# Patient Record
Sex: FEMALE | Race: BLACK OR AFRICAN AMERICAN | NOT HISPANIC OR LATINO | Employment: UNEMPLOYED | URBAN - METROPOLITAN AREA
[De-identification: names, ages, dates, MRNs, and addresses within clinical notes are randomized per-mention and may not be internally consistent; named-entity substitution may affect disease eponyms.]

---

## 2018-12-03 ENCOUNTER — OFFICE VISIT (OUTPATIENT)
Dept: URGENT CARE | Facility: MEDICAL CENTER | Age: 48
End: 2018-12-03
Payer: COMMERCIAL

## 2018-12-03 VITALS
RESPIRATION RATE: 20 BRPM | DIASTOLIC BLOOD PRESSURE: 78 MMHG | SYSTOLIC BLOOD PRESSURE: 132 MMHG | TEMPERATURE: 96.8 F | OXYGEN SATURATION: 99 % | HEART RATE: 76 BPM

## 2018-12-03 DIAGNOSIS — R35.0 URINE FREQUENCY: Primary | ICD-10-CM

## 2018-12-03 DIAGNOSIS — R10.2 PELVIC PAIN: ICD-10-CM

## 2018-12-03 LAB
SL AMB  POCT GLUCOSE, UA: NEGATIVE
SL AMB LEUKOCYTE ESTERASE,UA: NEGATIVE
SL AMB POCT BILIRUBIN,UA: NEGATIVE
SL AMB POCT BLOOD,UA: NEGATIVE
SL AMB POCT CLARITY,UA: CLEAR
SL AMB POCT COLOR,UA: YELLOW
SL AMB POCT KETONES,UA: NEGATIVE
SL AMB POCT NITRITE,UA: NEGATIVE
SL AMB POCT PH,UA: 7
SL AMB POCT SPECIFIC GRAVITY,UA: 1.01
SL AMB POCT URINE HCG: NEGATIVE
SL AMB POCT URINE PROTEIN: NEGATIVE
SL AMB POCT UROBILINOGEN: NEGATIVE

## 2018-12-03 PROCEDURE — 87086 URINE CULTURE/COLONY COUNT: CPT | Performed by: PHYSICIAN ASSISTANT

## 2018-12-03 PROCEDURE — 81002 URINALYSIS NONAUTO W/O SCOPE: CPT | Performed by: PHYSICIAN ASSISTANT

## 2018-12-03 PROCEDURE — 99283 EMERGENCY DEPT VISIT LOW MDM: CPT | Performed by: PHYSICIAN ASSISTANT

## 2018-12-03 PROCEDURE — G0382 LEV 3 HOSP TYPE B ED VISIT: HCPCS | Performed by: PHYSICIAN ASSISTANT

## 2018-12-03 RX ORDER — MELATONIN
1000 DAILY
COMMUNITY
End: 2020-07-09

## 2018-12-03 NOTE — LETTER
December 3, 2018     Patient: Carl Pérez   YOB: 1970   Date of Visit: 12/3/2018       To Whom It May Concern: It is my medical opinion that Carl Pérez may return to work on 12/04/2018  If you have any questions or concerns, please don't hesitate to call           Sincerely,        Ottoniel Rodríguez PA-C    CC: No Recipients

## 2018-12-03 NOTE — PROGRESS NOTES
[email or web address]        NAME: Cam Nelson is a 57 y o  female  : 1970    MRN: [id number]  DATE: December 3, 2018  TIME: 11:56 AM    Assessment and Plan   Urine frequency [R35 0]  1  Urine frequency  POCT urine dip    Urine culture   2  Pelvic pain  POCT urine HCG         Patient Instructions     Follow up with PCP in 3-5 days  Proceed to  ER if symptoms worsen  Chief Complaint     Chief Complaint   Patient presents with    Possible UTI     Pt with urgency and frequency of urination for 1 week  Denies fever or chills  Complaining of suprapubic pressure  History of Present Illness       70-year-old female presents with urinary symptoms and pelvic pain  Patient states she began with urinary symptoms approximately 1 week ago  She noted her urine to have a strong odor as well as frequency and hesitancy  She also then developed some pelvic discomfort and fullness in her pelvic region  Patient states she is little bit late for her menstrual period  Denies possibility of pregnancy  Patient currently uses Essure for birth control  She was recently at her OBGYN 6-7 months ago and had a normal examination  Denies any vaginal complaints, rash or discharge  Review of Systems   Review of Systems   Constitutional: Negative  Genitourinary: Positive for dysuria, frequency, pelvic pain and urgency  Negative for decreased urine volume, difficulty urinating, dyspareunia, enuresis, flank pain, genital sores, hematuria, menstrual problem, vaginal bleeding, vaginal discharge and vaginal pain  Skin: Negative for rash           Current Medications       Current Outpatient Prescriptions:     cholecalciferol (VITAMIN D3) 1,000 units tablet, Take 1,000 Units by mouth daily, Disp: , Rfl:     Current Allergies     Allergies as of 2018 - Reviewed 2018   Allergen Reaction Noted    Penicillins Hives 2018            The following portions of the patient's history were reviewed and updated as appropriate: allergies, current medications, past family history, past medical history, past social history, past surgical history and problem list     Objective   /78 (BP Location: Right arm, Patient Position: Sitting, Cuff Size: Standard)   Pulse 76   Temp (!) 96 8 °F (36 °C) (Tympanic)   Resp 20   SpO2 99%        Physical Exam     Physical Exam   Constitutional: Vital signs are normal  She appears well-developed and well-nourished  No distress  HENT:   Head: Normocephalic and atraumatic  Right Ear: Hearing, tympanic membrane, external ear and ear canal normal    Left Ear: Hearing, tympanic membrane, external ear and ear canal normal    Nose: Nose normal  No mucosal edema or rhinorrhea  Right sinus exhibits no maxillary sinus tenderness and no frontal sinus tenderness  Left sinus exhibits no maxillary sinus tenderness and no frontal sinus tenderness  Mouth/Throat: Uvula is midline, oropharynx is clear and moist and mucous membranes are normal  No oropharyngeal exudate, posterior oropharyngeal edema, posterior oropharyngeal erythema or tonsillar abscesses  Eyes: Conjunctivae and lids are normal    Cardiovascular: Normal rate, regular rhythm and normal heart sounds  No murmur heard  Pulmonary/Chest: Effort normal and breath sounds normal  No respiratory distress  She has no decreased breath sounds  She has no wheezes  She has no rhonchi  She has no rales  Abdominal: Bowel sounds are normal  There is tenderness in the suprapubic area  There is no rigidity, no rebound, no guarding and no CVA tenderness  Lymphadenopathy:        Head (right side): No submandibular and no tonsillar adenopathy present  Head (left side): No submandibular and no tonsillar adenopathy present  Skin: No rash noted  Nursing note and vitals reviewed

## 2018-12-03 NOTE — PATIENT INSTRUCTIONS
Urinary symptoms, pelvic pain and fullness:  Advised patient urine dip and pregnancy test was negative  We will send the urine specimen to the hospital for culture  Results of the culture will be back on Wednesday  If there is any sign of infection we will treat her with the appropriate antibiotic  Advised her to follow up with her OBGYN as soon as possible for an examination  Any worsening of symptoms go to the ER

## 2018-12-05 LAB — BACTERIA UR CULT: NORMAL

## 2019-10-25 ENCOUNTER — OFFICE VISIT (OUTPATIENT)
Dept: OBGYN CLINIC | Facility: CLINIC | Age: 49
End: 2019-10-25
Payer: COMMERCIAL

## 2019-10-25 VITALS
WEIGHT: 254 LBS | SYSTOLIC BLOOD PRESSURE: 122 MMHG | BODY MASS INDEX: 40.82 KG/M2 | DIASTOLIC BLOOD PRESSURE: 78 MMHG | HEIGHT: 66 IN

## 2019-10-25 DIAGNOSIS — Z12.31 ENCOUNTER FOR SCREENING MAMMOGRAM FOR BREAST CANCER: ICD-10-CM

## 2019-10-25 DIAGNOSIS — Z12.4 CERVICAL CANCER SCREENING: ICD-10-CM

## 2019-10-25 DIAGNOSIS — Z30.8 ENCOUNTER FOR POST ESSURE STERILIZATION CHECK: ICD-10-CM

## 2019-10-25 DIAGNOSIS — Z01.419 ENCOUNTER FOR GYNECOLOGICAL EXAMINATION (GENERAL) (ROUTINE) WITHOUT ABNORMAL FINDINGS: Primary | ICD-10-CM

## 2019-10-25 PROCEDURE — 99386 PREV VISIT NEW AGE 40-64: CPT | Performed by: OBSTETRICS & GYNECOLOGY

## 2019-10-25 RX ORDER — PANTOPRAZOLE SODIUM 40 MG/1
40 TABLET, DELAYED RELEASE ORAL 2 TIMES DAILY
COMMUNITY
Start: 2019-03-21 | End: 2019-11-01 | Stop reason: SDUPTHER

## 2019-10-25 RX ORDER — FERROUS SULFATE 325(65) MG
325 TABLET ORAL DAILY
COMMUNITY

## 2019-10-25 NOTE — PATIENT INSTRUCTIONS
*CALL ONCE YOUR NEXT PERIOD STARTS*  *SCHEDULE MAMMOGRAM*      Wellness Visit for Adults   AMBULATORY CARE:   A wellness visit  is when you see your healthcare provider to get screened for health problems  You can also get advice on how to stay healthy  Write down your questions so you remember to ask them  Ask your healthcare provider how often you should have a wellness visit  What happens at a wellness visit:  Your healthcare provider will ask about your health, and your family history of health problems  This includes high blood pressure, heart disease, and cancer  He or she will ask if you have symptoms that concern you, if you smoke, and about your mood  You may also be asked about your intake of medicines, supplements, food, and alcohol  Any of the following may be done:  · Your weight  will be checked  Your height may also be checked so your body mass index (BMI) can be calculated  Your BMI shows if you are at a healthy weight  · Your blood pressure  and heart rate will be checked  Your temperature may also be checked  · Blood and urine tests  may be done  Blood tests may be done to check your cholesterol levels  Abnormal cholesterol levels increase your risk for heart disease and stroke  You may also need a blood or urine test to check for diabetes if you are at increased risk  Urine tests may be done to look for signs of an infection or kidney disease  · A physical exam  includes checking your heartbeat and lungs with a stethoscope  Your healthcare provider may also check your skin to look for sun damage  · Screening tests  may be recommended  A screening test is done to check for diseases that may not cause symptoms  The screening tests you may need depend on your age, gender, family history, and lifestyle habits  For example, colorectal screening may be recommended if you are 48years old or older    Screening tests you need if you are a woman:   · A Pap smear  is used to screen for cervical cancer  Pap smears are usually done every 3 to 5 years depending on your age  You may need them more often if you have had abnormal Pap smear test results in the past  Ask your healthcare provider how often you should have a Pap smear  · A mammogram  is an x-ray of your breasts to screen for breast cancer  Experts recommend mammograms every 2 years starting at age 48 years  You may need a mammogram at age 52 years or younger if you have an increased risk for breast cancer  Talk to your healthcare provider about when you should start having mammograms and how often you need them  Vaccines you may need:   · Get an influenza vaccine  every year  The influenza vaccine protects you from the flu  Several types of viruses cause the flu  The viruses change over time, so new vaccines are made each year  · Get a tetanus-diphtheria (Td) booster vaccine  every 10 years  This vaccine protects you against tetanus and diphtheria  Tetanus is a severe infection that may cause painful muscle spasms and lockjaw  Diphtheria is a severe bacterial infection that causes a thick covering in the back of your mouth and throat  · Get a human papillomavirus (HPV) vaccine  if you are female and aged 23 to 32 or male 23 to 24 and never received it  This vaccine protects you from HPV infection  HPV is the most common infection spread by sexual contact  HPV may also cause vaginal, penile, and anal cancers  · Get a pneumococcal vaccine  if you are aged 72 years or older  The pneumococcal vaccine is an injection given to protect you from pneumococcal disease  Pneumococcal disease is an infection caused by pneumococcal bacteria  The infection may cause pneumonia, meningitis, or an ear infection  · Get a shingles vaccine  if you are aged 61 or older, even if you have had shingles before  The shingles vaccine is an injection to protect you from the varicella-zoster virus  This is the same virus that causes chickenpox  Shingles is a painful rash that develops in people who had chickenpox or have been exposed to the virus  How to eat healthy:  My Plate is a model for planning healthy meals  It shows the types and amounts of foods that should go on your plate  Fruits and vegetables make up about half of your plate, and grains and protein make up the other half  A serving of dairy is included on the side of your plate  The amount of calories and serving sizes you need depends on your age, gender, weight, and height  Examples of healthy foods are listed below:  · Eat a variety of vegetables  such as dark green, red, and orange vegetables  You can also include canned vegetables low in sodium (salt) and frozen vegetables without added butter or sauces  · Eat a variety of fresh fruits , canned fruit in 100% juice, frozen fruit, and dried fruit  · Include whole grains  At least half of the grains you eat should be whole grains  Examples include whole-wheat bread, wheat pasta, brown rice, and whole-grain cereals such as oatmeal     · Eat a variety of protein foods such as seafood (fish and shellfish), lean meat, and poultry without skin (turkey and chicken)  Examples of lean meats include pork leg, shoulder, or tenderloin, and beef round, sirloin, tenderloin, and extra lean ground beef  Other protein foods include eggs and egg substitutes, beans, peas, soy products, nuts, and seeds  · Choose low-fat dairy products such as skim or 1% milk or low-fat yogurt, cheese, and cottage cheese  · Limit unhealthy fats  such as butter, hard margarine, and shortening  Exercise:  Exercise at least 30 minutes per day on most days of the week  Some examples of exercise include walking, biking, dancing, and swimming  You can also fit in more physical activity by taking the stairs instead of the elevator or parking farther away from stores  Include muscle strengthening activities 2 days each week   Regular exercise provides many health benefits  It helps you manage your weight, and decreases your risk for type 2 diabetes, heart disease, stroke, and high blood pressure  Exercise can also help improve your mood  Ask your healthcare provider about the best exercise plan for you  General health and safety guidelines:   · Do not smoke  Nicotine and other chemicals in cigarettes and cigars can cause lung damage  Ask your healthcare provider for information if you currently smoke and need help to quit  E-cigarettes or smokeless tobacco still contain nicotine  Talk to your healthcare provider before you use these products  · Limit alcohol  A drink of alcohol is 12 ounces of beer, 5 ounces of wine, or 1½ ounces of liquor  · Lose weight, if needed  Being overweight increases your risk of certain health conditions  These include heart disease, high blood pressure, type 2 diabetes, and certain types of cancer  · Protect your skin  Do not sunbathe or use tanning beds  Use sunscreen with a SPF 15 or higher  Apply sunscreen at least 15 minutes before you go outside  Reapply sunscreen every 2 hours  Wear protective clothing, hats, and sunglasses when you are outside  · Drive safely  Always wear your seatbelt  Make sure everyone in your car wears a seatbelt  A seatbelt can save your life if you are in an accident  Do not use your cell phone when you are driving  This could distract you and cause an accident  Pull over if you need to make a call or send a text message  · Practice safe sex  Use latex condoms if are sexually active and have more than one partner  Your healthcare provider may recommend screening tests for sexually transmitted infections (STIs)  · Wear helmets, lifejackets, and protective gear  Always wear a helmet when you ride a bike or motorcycle, go skiing, or play sports that could cause a head injury  Wear protective equipment when you play sports  Wear a lifejacket when you are on a boat or doing water sports    © 2017 [facility] Information is for End User's use only and may not be sold, redistributed or otherwise used for commercial purposes  All illustrations and images included in CareNotes® are the copyrighted property of [employer]  or Ji Goodman  The above information is an  only  It is not intended as medical advice for individual conditions or treatments  Talk to your doctor, nurse or pharmacist before following any medical regimen to see if it is safe and effective for you  Breast Self Exam for Women   WHAT YOU NEED TO KNOW:   A BSE is a way to check your breasts for lumps and other changes  Regular BSEs can help you know how your breasts normally look and feel  Most breast lumps or changes are not cancer, but you should always have them checked by a healthcare provider  Your healthcare provider can also watch you do a BSE and can tell you if you are doing your BSE correctly  DISCHARGE INSTRUCTIONS:   Contact your healthcare provider if:   · You find any lumps or changes in your breasts  · You have breast pain or fluid coming from your nipples  · You have questions or concerns about your condition or care  Why you should do a BSE:  Breast cancer is the most common type of cancer in women  Even if you have mammograms, you may still want to do a BSE regularly  If you know how your breasts normally feel and look, it may help you know when to contact your healthcare provider  Mammograms can miss some cancers  You may find a lump during a BSE that did not show up on your mammogram   When you should do a BSE:  Arron Larve your calendar to help you remember to do BSE on a regular schedule  One easy way to remember to do a BSE is to do the exam on the same day of each month  If you have periods, you may want to do your BSE 1 week after your period ends  This is the time when your breasts may be the least swollen, lumpy, or tender   You can do regular BSEs even if you are breastfeeding or have breast implants  How to do a BSE:   · Look at your breasts in a mirror  Look at the size and shape of each breast and nipple  Check for swelling, lumps, dimpling, scaly skin, or other skin changes  Look for nipple changes, such as a nipple that is painful or beginning to pull inward  Gently squeeze both nipples and check to see if fluid (that is not breast milk) comes out of them  If you find any of these or other breast changes, contact your healthcare provider  Check your breasts while you sit or  the following 3 positions:    [facility] your arms down at your sides  ¨ Raise your hands and join them behind your head  ¨ Put firm pressure with your hands on your hips  Bend slightly forward while you look at your breasts in the mirror  · Lie down and feel your breasts  When you lie down, your breast tissue spreads out evenly over your chest  This makes it easier for you to feel for lumps and anything that may not be normal for your breasts  Do a BSE on one breast at a time  ¨ Place a small pillow or towel under your left shoulder  Put your left arm behind your head  ¨ Use the 3 middle fingers of your right hand  Use your fingertip pads, on the top of your fingers  Your fingertip pad is the most sensitive part of your finger  ¨ Use small circles to feel your breast tissue  Use your fingertip pads to make dime-sized, overlapping circles on your breast and armpits  Use light, medium, and firm pressure  First, press lightly  Second, press with medium pressure to feel a little deeper into the breast  Last, use firm pressure to feel deep within your breast     ¨ Examine your entire breast area  Examine the breast area from above the breast to below the breast where you feel only ribs  Make small circles with your fingertips, starting in the middle of your armpit  Make circles going up and down the breast area  Continue toward your breast and all the way across it  Examine the area from your armpit all the way over to the middle of your chest (breastbone)  Stop at the middle of your chest     ¨ Move the pillow or towel to your right shoulder, and put your right arm behind your head  Use the 3 fingertip pads of your left hand, and repeat the above steps to do a BSE on your right breast        What else you can do to check for breast problems or cancer:  Some experts suggest that women 36years of age or older should have a mammogram every year  Other experts suggest that women between the ages of 48and 76years old should have a mammogram every 2 years  Talk to your healthcare provider about when you should have a mammogram   Follow up with your healthcare provider as directed: Your healthcare provider can watch you and tell you if you are doing your BSE correctly  Write down your questions so you remember to ask them during your visits  © 2017 [address] [employer] Information is for End User's use only and may not be sold, redistributed or otherwise used for commercial purposes  All illustrations and images included in CareNotes® are the copyrighted property of [employer]  or Ji Goodman  The above information is an  only  It is not intended as medical advice for individual conditions or treatments  Talk to your doctor, nurse or pharmacist before following any medical regimen to see if it is safe and effective for you

## 2019-10-25 NOTE — PROGRESS NOTES
Assessment        Diagnoses and all orders for this visit:    Encounter for gynecological examination (general) (routine) without abnormal findings    Cervical cancer screening    Encounter for screening mammogram for breast cancer  -     Mammo screening bilateral w 3d & cad; Future    Other orders  -     Cancel: Liquid-based pap, screening  -     ferrous sulfate 325 (65 Fe) mg tablet; ferrous sulfate 325 mg (65 mg iron) tablet  -     pantoprazole (PROTONIX) 40 mg tablet; pantoprazole 40 mg tablet,delayed release   TAKE 1 TABLET BY MOUTH TWICE A DAY                  Plan      Await pap smear results  Breast self exam technique reviewed and patient encouraged to perform self-exam monthly  Contraception: Essure  Educational material distributed  Follow up in 1 year  Follow up as needed  Mammogram    PAP deferred      Reviewed ASCCP recommendations for cervical cytology with patient  It is recommended to start screening at age of 24  Women aged 21-29 should be screened with cytology alone every 3 years  Women aged 33-67 should be screened with cytology with HPV co-testing every 5 years or cytology alone every 3 years  Women older than 72 do not need screening if they had adequate negative screening in the past (3 consecutive negative cytology or 2 negative co-tests) 10 years  Women who underwent total hysterectomy for benign indications and do no have a history of RADHA 2 or higher do not need cervical cytology screening  XRAY in 2011 shows possible dye spillage on the right, she states she had a re occlusion, she will have repeat HSG for now  Asked pt to call once her next period starts to coordinate scheduling HSG  Sharad      Flako Romero is a 54 y o  female who presents for annual exam     She is sexually active with  of 20+ years  She has no complaints    She had the Essure done about 10 years ago (10/10/2010)    Last Pap: 8/2/17 negative, neg HPV  Last mammogram: 7/30/18    Current contraception: aRshida  History of abnormal Pap smear: no  History of abnormal mammogram: no  Family history of uterine or ovarian cancer: (MOM) - ovarian  Family history of breast cancer: no    Menstrual History:  OB History        10    Para   4    Term   3       1    AB   6    Living   4       SAB   6    TAB   0    Ectopic   0    Multiple   0    Live Births   3                Menarche age: 14  Patient's last menstrual period was 10/15/2019 (exact date)  Past Medical History:   Diagnosis Date    PCOS (polycystic ovarian syndrome)      History reviewed  No pertinent surgical history    Social History     Socioeconomic History    Marital status: /Civil Union     Spouse name: Not on file    Number of children: Not on file    Years of education: Not on file    Highest education level: Not on file   Occupational History    Not on file   Social Needs    Financial resource strain: Not on file    Food insecurity:     Worry: Not on file     Inability: Not on file    Transportation needs:     Medical: Not on file     Non-medical: Not on file   Tobacco Use    Smoking status: Former Smoker    Smokeless tobacco: Never Used   Substance and Sexual Activity    Alcohol use: No    Drug use: No    Sexual activity: Yes     Partners: Male     Birth control/protection: Female Sterilization     Comment: Rashida    Lifestyle    Physical activity:     Days per week: Not on file     Minutes per session: Not on file    Stress: Not on file   Relationships    Social connections:     Talks on phone: Not on file     Gets together: Not on file     Attends Islam service: Not on file     Active member of club or organization: Not on file     Attends meetings of clubs or organizations: Not on file     Relationship status: Not on file    Intimate partner violence:     Fear of current or ex partner: Not on file     Emotionally abused: Not on file     Physically abused: Not on file     Forced sexual activity: Not on file   Other Topics Concern    Not on file   Social History Narrative    Not on file         Current Outpatient Medications:     cholecalciferol (VITAMIN D3) 1,000 units tablet, Take 1,000 Units by mouth daily, Disp: , Rfl:     ferrous sulfate 325 (65 Fe) mg tablet, ferrous sulfate 325 mg (65 mg iron) tablet, Disp: , Rfl:     pantoprazole (PROTONIX) 40 mg tablet, pantoprazole 40 mg tablet,delayed release  TAKE 1 TABLET BY MOUTH TWICE A DAY, Disp: , Rfl:     Allergies   Allergen Reactions    Penicillins Hives           Review of Systems   Constitutional: Negative  HENT: Negative  Eyes: Negative  Respiratory: Negative  Cardiovascular: Negative  Gastrointestinal: Negative  Endocrine: Negative  Genitourinary:        As noted in HPI   Musculoskeletal: Negative  Skin: Negative  Allergic/Immunologic: Negative  Neurological: Negative  Hematological: Negative  Psychiatric/Behavioral: Negative  /78 (BP Location: Right arm, Patient Position: Sitting, Cuff Size: Standard)   Ht 5' 6" (1 676 m)   Wt 115 kg (254 lb)   LMP 10/15/2019 (Exact Date)   BMI 41 00 kg/m²         Physical Exam   Constitutional: She is oriented to person, place, and time  She appears well-developed and well-nourished  Genitourinary: Rectum normal, vagina normal and uterus normal  Pelvic exam was performed with patient supine  There is no rash or lesion on the right labia  There is no rash or lesion on the left labia  Vagina exhibits rugosity  Vagina exhibits no lesion  No bleeding in the vagina  No vaginal discharge found  Right adnexum does not display mass, does not display tenderness and does not display fullness  Left adnexum does not display mass, does not display tenderness and does not display fullness  Cervix does not exhibit motion tenderness, lesion or polyp  Uterus is not enlarged or tender  Rectal exam shows no external hemorrhoid     Genitourinary Comments: Perineum and anus: Normal    Pelvic support  - Vaginal outlet: normal  - Anterior vaginal wall: normal  - Posterior vaginal wall: normal    Bladder: normal    Urethra: normal    Urethral support: normal       HENT:   Head: Normocephalic  Neck: No thyromegaly present  Cardiovascular: Normal rate, regular rhythm and normal heart sounds  No murmur heard  Pulmonary/Chest: Effort normal and breath sounds normal  No respiratory distress  She has no wheezes  She has no rales  Right breast exhibits no mass, no nipple discharge, no skin change and no tenderness  Left breast exhibits no mass, no nipple discharge, no skin change and no tenderness  Abdominal: Soft  She exhibits no distension and no mass  There is no tenderness  There is no rebound and no guarding  Musculoskeletal: She exhibits no edema or tenderness  Lymphadenopathy:        Right: No inguinal adenopathy present  Left: No inguinal adenopathy present  Neurological: She is alert and oriented to person, place, and time  Skin: Skin is warm and dry  Psychiatric: She has a normal mood and affect

## 2019-11-01 ENCOUNTER — OFFICE VISIT (OUTPATIENT)
Dept: GASTROENTEROLOGY | Facility: MEDICAL CENTER | Age: 49
End: 2019-11-01
Payer: COMMERCIAL

## 2019-11-01 VITALS
HEART RATE: 81 BPM | DIASTOLIC BLOOD PRESSURE: 68 MMHG | TEMPERATURE: 97.8 F | BODY MASS INDEX: 42 KG/M2 | SYSTOLIC BLOOD PRESSURE: 115 MMHG | WEIGHT: 260.2 LBS

## 2019-11-01 DIAGNOSIS — K29.70 GASTRITIS WITHOUT BLEEDING, UNSPECIFIED CHRONICITY, UNSPECIFIED GASTRITIS TYPE: ICD-10-CM

## 2019-11-01 DIAGNOSIS — K80.20 CALCULUS OF GALLBLADDER WITHOUT CHOLECYSTITIS WITHOUT OBSTRUCTION: ICD-10-CM

## 2019-11-01 DIAGNOSIS — R10.9 LEFT SIDED ABDOMINAL PAIN: Primary | ICD-10-CM

## 2019-11-01 DIAGNOSIS — R11.0 NAUSEA: ICD-10-CM

## 2019-11-01 DIAGNOSIS — R19.4 CHANGE IN BOWEL HABIT: ICD-10-CM

## 2019-11-01 DIAGNOSIS — R74.8 ELEVATED LIPASE: ICD-10-CM

## 2019-11-01 PROBLEM — R19.5 CHANGE IN STOOL: Status: ACTIVE | Noted: 2019-11-01

## 2019-11-01 PROCEDURE — 99244 OFF/OP CNSLTJ NEW/EST MOD 40: CPT | Performed by: INTERNAL MEDICINE

## 2019-11-01 RX ORDER — DICYCLOMINE HYDROCHLORIDE 10 MG/1
10 CAPSULE ORAL 2 TIMES DAILY PRN
Qty: 60 CAPSULE | Refills: 4 | Status: SHIPPED | OUTPATIENT
Start: 2019-11-01 | End: 2020-07-02 | Stop reason: ALTCHOICE

## 2019-11-01 RX ORDER — PANTOPRAZOLE SODIUM 40 MG/1
40 TABLET, DELAYED RELEASE ORAL 2 TIMES DAILY
Qty: 60 TABLET | Refills: 7 | Status: SHIPPED | OUTPATIENT
Start: 2019-11-01 | End: 2019-11-26

## 2019-11-01 RX ORDER — ONDANSETRON 4 MG/1
4 TABLET, ORALLY DISINTEGRATING ORAL EVERY 6 HOURS PRN
Qty: 20 TABLET | Refills: 0 | Status: SHIPPED | OUTPATIENT
Start: 2019-11-01 | End: 2020-07-02 | Stop reason: ALTCHOICE

## 2019-11-01 NOTE — PROGRESS NOTES
[facility] - Outpatient Consultation  Haris Lr 54 y o  female MRN: [id number]  Encounter: [id number]          ASSESSMENT AND PLAN:      1  Left sided abdominal pain  - Giardia antigen; Future  - Celiac Disease Antibody Profile; Future  - Ova and parasite examination; Future  - Ova and parasite examination; Future  - Calprotectin,Fecal; Future  - Clostridium difficile toxin by PCR; Future  - Stool Enteric Bacterial Panel by PCR; Future  - dicyclomine (BENTYL) 10 mg capsule; Take 1 capsule (10 mg total) by mouth 2 (two) times a day as needed (For abdominal pain)  Dispense: 60 capsule; Refill: 4  - Lipase; Future  - CT abdomen w contrast; Future  - Comprehensive metabolic panel; Future    2  Gastritis without bleeding, unspecified chronicity, unspecified gastritis type    - pantoprazole (PROTONIX) 40 mg tablet; Take 1 tablet (40 mg total) by mouth 2 (two) times a day  Dispense: 60 tablet; Refill: 7    3  Change in bowel habit  - Colonoscopy; Future  - EGD; Future    4  Nausea  - ondansetron (ZOFRAN-ODT) 4 mg disintegrating tablet; Take 1 tablet (4 mg total) by mouth every 6 (six) hours as needed for nausea or vomiting  Dispense: 20 tablet; Refill: 0    She is a 57-year-old with symptoms which are compatible with IBS D presents with abdominal pain in the periumbilical region, change in bowel habits, increased lipase, nausea, history of cholelithiasis, presents here for evaluation  Her biggest complaint currently is regarding diffuse abdominal pain  She does have history of lactose intolerance has been avoiding dairy without improvement of symptoms  Her symptoms are associated gas/bloating with foul-smelling bowel movements  Symptoms started approximately year ago  She has had endoscopic evaluation earlier this year which showed gastritis and duodenitis  She denies any NSAID use currently  She did have improvement symptoms of PPI therapy      I suspect her mild elevation in lipase may be secondary to gastritis and not compatible with acute pancreatitis  Despite this I would like to recheck lipase level to make sure it has not continue to increase and check CT scan for evaluation for pancreatitis  Also plan for EGD and colonoscopy evaluation with stool studies for further evaluation of foul-smelling odor stool and change in bowel habits  If EGD and colonoscopy are negative, stool studies are negative, CT scan is negative we can consider probiotics  We can also consider treatment with rifaximin  Discuss FODMAP diet today   ______________________________________________________________________    HPI:      She is a 82-year-old female with change in bowel habits with IBS D like symptoms, diffuse abdominal pain, gallstone disease, kidney stone disease presents here for evaluation  No family history of colorectal cancer  She has had debilitating symptoms of gas bloating and had undergone EGD evaluation and March of 2019 which showed gastritis/duodenitis  Biopsies were negative for H pylori and celiac disease  This was performed early evaluate hospita  She does have history of cholelithiasis and symptoms are secondary to oral intake of food  She was being considered for cholecystectomy but this has been deferred after having negative ultrasound for cholecystitis and HIDA scan  She is on iron therapy but not taking frequently  She was also on PPI therapy which did improve her symptoms but recently ran out of refills  No prior colonoscopy evaluation  She does also report foul-smelling stool and symptoms more compatible with IBS D with loose stools  She has had antibiotic exposure approximately year ago  REVIEW OF SYSTEMS:    CONSTITUTIONAL: Denies any fever, chills, rigors, and weight loss  HEENT: No earache or tinnitus  Denies hearing loss or visual disturbances  CARDIOVASCULAR: No chest pain or palpitations     RESPIRATORY: Denies any cough, hemoptysis, shortness of breath or dyspnea on exertion  GASTROINTESTINAL: As noted in the History of Present Illness  GENITOURINARY: No problems with urination  Denies any hematuria or dysuria  NEUROLOGIC: No dizziness or vertigo, denies headaches  MUSCULOSKELETAL: Denies any muscle or joint pain  SKIN: Denies skin rashes or itching  ENDOCRINE: Denies excessive thirst  Denies intolerance to heat or cold  PSYCHOSOCIAL: Denies depression or anxiety  Denies any recent memory loss  Historical Information   Past Medical History:   Diagnosis Date    PCOS (polycystic ovarian syndrome)      History reviewed  No pertinent surgical history  Social History   Social History     Substance and Sexual Activity   Alcohol Use No     Social History     Substance and Sexual Activity   Drug Use No     Social History     Tobacco Use   Smoking Status Former Smoker   Smokeless Tobacco Never Used     Family History   Problem Relation Age of Onset    Ovarian cancer Mother        Meds/Allergies       Current Outpatient Medications:     cholecalciferol (VITAMIN D3) 1,000 units tablet    dicyclomine (BENTYL) 10 mg capsule    ferrous sulfate 325 (65 Fe) mg tablet    ondansetron (ZOFRAN-ODT) 4 mg disintegrating tablet    pantoprazole (PROTONIX) 40 mg tablet    Allergies   Allergen Reactions    Penicillins Hives           Objective     Blood pressure 115/68, pulse 81, temperature 97 8 °F (36 6 °C), temperature source Tympanic, weight 118 kg (260 lb 3 2 oz), last menstrual period 10/15/2019  Body mass index is 42 kg/m²  PHYSICAL EXAM:      General Appearance:   Alert, cooperative, no distress   HEENT:   Normocephalic, atraumatic, anicteric      Neck:  Supple, symmetrical, trachea midline   Lungs:   Clear to auscultation bilaterally; no rales, rhonchi or wheezing; respirations unlabored    Heart[de-identified]   Regular rate and rhythm; no murmur, rub, or gallop     Abdomen:   Soft, diffuse tenderness and distension; normal bowel sounds; no masses, no organomegaly    Genitalia:   Deferred    Rectal:   Deferred    Extremities:  No cyanosis, clubbing or edema    Pulses:  2+ and symmetric    Skin:  No jaundice, rashes, or lesions    Lymph nodes:  No palpable cervical lymphadenopathy        Lab Results:   No visits with results within 1 Day(s) from this visit  Latest known visit with results is:   Office Visit on 12/03/2018   Component Date Value    LEUKOCYTE ESTERASE,UA 12/03/2018 negative     NITRITE,UA 12/03/2018 negative     SL AMB POCT UROBILINOGEN 12/03/2018 negative     POCT URINE PROTEIN 12/03/2018 negative      PH,UA 12/03/2018 7 0     BLOOD,UA 12/03/2018 negative     SPECIFIC GRAVITY,UA 12/03/2018 1 010     KETONES,UA 12/03/2018 negative     BILIRUBIN,UA 12/03/2018 negative     GLUCOSE, UA 12/03/2018 negative      COLOR,UA 12/03/2018 yellow     CLARITY,UA 12/03/2018 clear     URINE HCG 12/03/2018 negative     Urine Culture 12/03/2018 20,000-29,000 cfu/ml           Radiology Results:   No results found

## 2019-11-04 ENCOUNTER — TELEPHONE (OUTPATIENT)
Dept: GASTROENTEROLOGY | Facility: CLINIC | Age: 49
End: 2019-11-04

## 2019-11-04 NOTE — TELEPHONE ENCOUNTER
Patients GI provider:  Dr Ludin Grier    Number to return call: [phone]    Reason for call: Pt calling to sched colon/egd procedures    Scheduled procedure/appointment date if applicable: NA

## 2019-11-08 ENCOUNTER — TELEPHONE (OUTPATIENT)
Dept: GASTROENTEROLOGY | Facility: MEDICAL CENTER | Age: 49
End: 2019-11-08

## 2019-11-08 NOTE — TELEPHONE ENCOUNTER
Attempted to leave message to schedule procedure with patient but the mailbox is full  Will try to reach patient Monday morning

## 2019-11-08 NOTE — TELEPHONE ENCOUNTER
----- Message from Yamile Allen MD sent at 11/8/2019  9:29 AM EST -----  Regarding: RE: Colon/Egd Procedure  I can be over booked for 11/14  ----- Message -----  From: Haris Keller  Sent: 11/4/2019   8:56 AM EST  To: Yamile Allen MD  Subject: Colon/Egd Procedure                              Patient was seen Friday 11/01, on the checkout sheet you stated that you would like the procedures to be done soon in a hospital  I am only seeing 11/14 in [facility] and 12/10 at [facility] heart, both days are full  Please advise if you would like to overbook the patient or if it is okay to wait until the schedule opens up  Thanks

## 2019-11-11 ENCOUNTER — HOSPITAL ENCOUNTER (OUTPATIENT)
Dept: CT IMAGING | Facility: HOSPITAL | Age: 49
Discharge: HOME | End: 2019-11-11
Attending: INTERNAL MEDICINE
Payer: COMMERCIAL

## 2019-11-11 ENCOUNTER — TELEPHONE (OUTPATIENT)
Dept: GASTROENTEROLOGY | Facility: MEDICAL CENTER | Age: 49
End: 2019-11-11

## 2019-11-11 DIAGNOSIS — R10.9 LEFT SIDED ABDOMINAL PAIN: ICD-10-CM

## 2019-11-11 PROCEDURE — 74160 CT ABDOMEN W/CONTRAST: CPT

## 2019-11-11 RX ADMIN — IOHEXOL 100 ML: 350 INJECTION, SOLUTION INTRAVENOUS at 17:11

## 2019-11-11 NOTE — TELEPHONE ENCOUNTER
Patient called and confirmed 11/14 was a good day for her procedure  She is aware she needs a  and voiced understanding of prep instructions that were given while in office

## 2019-11-11 NOTE — TELEPHONE ENCOUNTER
Called and spoke to pt - needed to be r/s per Alfonso FERGUSON   Pt r/s for 11/15/19 with Dr Abby Arguelles at [facility]

## 2019-11-11 NOTE — TELEPHONE ENCOUNTER
----- Message from Onel García MD sent at 11/8/2019  9:29 AM EST -----  Regarding: RE: Colon/Egd Procedure  I can be over booked for 11/14  ----- Message -----  From: Samm Kendrick  Sent: 11/4/2019   8:56 AM EST  To: Onel García MD  Subject: Colon/Egd Procedure                              Patient was seen Friday 11/01, on the checkout sheet you stated that you would like the procedures to be done soon in a hospital  I am only seeing 11/14 in [facility] and 12/10 at [facility] heart, both days are full  Please advise if you would like to overbook the patient or if it is okay to wait until the schedule opens up  Thanks

## 2019-11-11 NOTE — TELEPHONE ENCOUNTER
----- Message from Mika Nagel MD sent at 11/8/2019  9:29 AM EST -----  Regarding: RE: Colon/Egd Procedure  I can be over booked for 11/14  ----- Message -----  From: Robson Ruffin  Sent: 11/4/2019   8:56 AM EST  To: Mika Nagel MD  Subject: Colon/Egd Procedure                              Patient was seen Friday 11/01, on the checkout sheet you stated that you would like the procedures to be done soon in a hospital  I am only seeing 11/14 in [facility] and 12/10 at [facility] heart, both days are full  Please advise if you would like to overbook the patient or if it is okay to wait until the schedule opens up  Thanks

## 2019-11-13 ENCOUNTER — TELEPHONE (OUTPATIENT)
Dept: GASTROENTEROLOGY | Facility: MEDICAL CENTER | Age: 49
End: 2019-11-13

## 2019-11-13 NOTE — TELEPHONE ENCOUNTER
----- Message from Miri Gooden MD sent at 11/12/2019  7:27 PM EST -----  She was identified to have gallstones it is unclear if this is the cause of her symptoms  I am still waiting follow-up labs which were ordered  Will further evaluate on EGD and colonoscopy evaluation as well    CT scan also identified a nonobstructive kidney stone    Otherwise CT scan was within normal limits

## 2019-11-14 ENCOUNTER — TELEPHONE (OUTPATIENT)
Dept: GASTROENTEROLOGY | Facility: HOSPITAL | Age: 49
End: 2019-11-14

## 2019-11-14 ENCOUNTER — TELEPHONE (OUTPATIENT)
Dept: GASTROENTEROLOGY | Facility: CLINIC | Age: 49
End: 2019-11-14

## 2019-11-14 RX ORDER — SODIUM CHLORIDE 9 MG/ML
125 INJECTION, SOLUTION INTRAVENOUS CONTINUOUS
Status: CANCELLED | OUTPATIENT
Start: 2019-11-14

## 2019-11-14 NOTE — TELEPHONE ENCOUNTER
Called pt's pharmacy, spoke to Pixley forest, PharmD  Ordered 2 Dulcolax tablets and 238 grams of Miralax powder to pt's pharmacy  Called and spoke to pt to let her know that they were called in and to  2 32oz Gatorades (no red, orange, or purple)  Pt demonstrated understanding

## 2019-11-14 NOTE — TELEPHONE ENCOUNTER
Patients GI provider:  Dr Hugh Arenas    Number to return call: (n/a    Reason for call: Pt calling to request a script for her miralax prep to be sent to [facility] in [place]       Scheduled procedure/appointment date if applicable: Apt/procedure - tomorrow Colonoscopy/EGD

## 2019-11-15 ENCOUNTER — HOSPITAL ENCOUNTER (OUTPATIENT)
Dept: GASTROENTEROLOGY | Facility: HOSPITAL | Age: 49
Setting detail: OUTPATIENT SURGERY
Discharge: HOME | End: 2019-11-15
Attending: INTERNAL MEDICINE
Payer: COMMERCIAL

## 2019-11-15 ENCOUNTER — ANESTHESIA EVENT (OUTPATIENT)
Dept: GASTROENTEROLOGY | Facility: HOSPITAL | Age: 49
End: 2019-11-15

## 2019-11-15 ENCOUNTER — ANESTHESIA (OUTPATIENT)
Dept: GASTROENTEROLOGY | Facility: HOSPITAL | Age: 49
End: 2019-11-15

## 2019-11-15 VITALS
SYSTOLIC BLOOD PRESSURE: 107 MMHG | BODY MASS INDEX: 42 KG/M2 | RESPIRATION RATE: 16 BRPM | HEART RATE: 80 BPM | TEMPERATURE: 98.8 F | OXYGEN SATURATION: 96 % | HEIGHT: 66 IN | DIASTOLIC BLOOD PRESSURE: 64 MMHG

## 2019-11-15 DIAGNOSIS — R19.4 CHANGE IN BOWEL HABIT: ICD-10-CM

## 2019-11-15 LAB
EXT PREGNANCY TEST URINE: NEGATIVE
EXT. CONTROL: NORMAL

## 2019-11-15 PROCEDURE — 81025 URINE PREGNANCY TEST: CPT | Performed by: ANESTHESIOLOGY

## 2019-11-15 PROCEDURE — 45385 COLONOSCOPY W/LESION REMOVAL: CPT | Performed by: INTERNAL MEDICINE

## 2019-11-15 PROCEDURE — 88305 TISSUE EXAM BY PATHOLOGIST: CPT | Performed by: PATHOLOGY

## 2019-11-15 PROCEDURE — 43239 EGD BIOPSY SINGLE/MULTIPLE: CPT | Performed by: INTERNAL MEDICINE

## 2019-11-15 PROCEDURE — 45380 COLONOSCOPY AND BIOPSY: CPT | Performed by: INTERNAL MEDICINE

## 2019-11-15 RX ORDER — PROPOFOL 10 MG/ML
INJECTION, EMULSION INTRAVENOUS AS NEEDED
Status: DISCONTINUED | OUTPATIENT
Start: 2019-11-15 | End: 2019-11-15 | Stop reason: SURG

## 2019-11-15 RX ORDER — SODIUM CHLORIDE 9 MG/ML
125 INJECTION, SOLUTION INTRAVENOUS CONTINUOUS
Status: DISCONTINUED | OUTPATIENT
Start: 2019-11-15 | End: 2019-11-19 | Stop reason: HOSPADM

## 2019-11-15 RX ORDER — CETIRIZINE HYDROCHLORIDE 10 MG/1
10 TABLET ORAL DAILY PRN
COMMUNITY

## 2019-11-15 RX ADMIN — PROPOFOL 50 MG: 10 INJECTION, EMULSION INTRAVENOUS at 13:06

## 2019-11-15 RX ADMIN — PROPOFOL 50 MG: 10 INJECTION, EMULSION INTRAVENOUS at 12:58

## 2019-11-15 RX ADMIN — PROPOFOL 50 MG: 10 INJECTION, EMULSION INTRAVENOUS at 13:04

## 2019-11-15 RX ADMIN — PROPOFOL 50 MG: 10 INJECTION, EMULSION INTRAVENOUS at 13:22

## 2019-11-15 RX ADMIN — PROPOFOL 50 MG: 10 INJECTION, EMULSION INTRAVENOUS at 12:42

## 2019-11-15 RX ADMIN — PROPOFOL 120 MG: 10 INJECTION, EMULSION INTRAVENOUS at 12:30

## 2019-11-15 RX ADMIN — PROPOFOL 50 MG: 10 INJECTION, EMULSION INTRAVENOUS at 13:19

## 2019-11-15 RX ADMIN — PROPOFOL 50 MG: 10 INJECTION, EMULSION INTRAVENOUS at 13:02

## 2019-11-15 RX ADMIN — PROPOFOL 50 MG: 10 INJECTION, EMULSION INTRAVENOUS at 12:36

## 2019-11-15 RX ADMIN — PROPOFOL 50 MG: 10 INJECTION, EMULSION INTRAVENOUS at 13:00

## 2019-11-15 RX ADMIN — PROPOFOL 50 MG: 10 INJECTION, EMULSION INTRAVENOUS at 12:49

## 2019-11-15 RX ADMIN — PROPOFOL 50 MG: 10 INJECTION, EMULSION INTRAVENOUS at 12:34

## 2019-11-15 RX ADMIN — PROPOFOL 50 MG: 10 INJECTION, EMULSION INTRAVENOUS at 12:52

## 2019-11-15 RX ADMIN — PROPOFOL 50 MG: 10 INJECTION, EMULSION INTRAVENOUS at 12:38

## 2019-11-15 RX ADMIN — PROPOFOL 50 MG: 10 INJECTION, EMULSION INTRAVENOUS at 12:33

## 2019-11-15 RX ADMIN — PROPOFOL 50 MG: 10 INJECTION, EMULSION INTRAVENOUS at 12:46

## 2019-11-15 RX ADMIN — PROPOFOL 50 MG: 10 INJECTION, EMULSION INTRAVENOUS at 12:55

## 2019-11-15 RX ADMIN — SODIUM CHLORIDE 125 ML/HR: 0.9 INJECTION, SOLUTION INTRAVENOUS at 12:06

## 2019-11-15 NOTE — H&P
H&P EXAM - Outpatient Endoscopy   Laura Fleming 54 y o  female MRN: [id number]    [facility] GI LAB PRE/POST   Encounter: [id number]        History and Physical -  [facility]  Laura Fleming 54 y o  female MRN: [id number]                  HPI: Laura Fleming is a 54y o  year old female who presents for egd/colonoscopy      REVIEW OF SYSTEMS: Per the HPI, and otherwise unremarkable  Historical Information   Past Medical History:   Diagnosis Date    GERD (gastroesophageal reflux disease)     History of transfusion     Kidney stone     PCOS (polycystic ovarian syndrome)      Past Surgical History:   Procedure Laterality Date    DILATION AND CURETTAGE OF UTERUS      D&E    EGD       Social History   Social History     Substance and Sexual Activity   Alcohol Use No     Social History     Substance and Sexual Activity   Drug Use No     Social History     Tobacco Use   Smoking Status Former Smoker   Smokeless Tobacco Never Used     Family History   Problem Relation Age of Onset    Ovarian cancer Mother        Meds/Allergies       (Not in a hospital admission)    Allergies   Allergen Reactions    Penicillins Hives       Objective     /62   Pulse 64   Temp 98 8 °F (37 1 °C) (Temporal)   Resp 18   Ht 5' 6" (1 676 m)   LMP 11/08/2019   SpO2 98%   BMI 42 00 kg/m²       PHYSICAL EXAM    Gen: NAD  CV: RRR  CHEST: Clear  ABD: soft, NT/ND  EXT: no edema      ASSESSMENT/PLAN:  This is a 54y o  year old female here for egd/colonoscopy, and she is stable and optimized for her procedure

## 2019-11-15 NOTE — ANESTHESIA PREPROCEDURE EVALUATION
Review of Systems/Medical History          Cardiovascular  Negative cardio ROS Exercise tolerance (METS): >4,     Pulmonary  Negative pulmonary ROS        GI/Hepatic    GERD ,             Endo/Other  Negative endo/other ROS   Obesity  morbid obesity   GYN  , Prior pregnancy/OB history : 7+ Parity: 4,          Hematology  Negative hematology ROS      Musculoskeletal  Negative musculoskeletal ROS        Neurology  Negative neurology ROS      Psychology   Negative psychology ROS              Physical Exam    Airway    Mallampati score: II  TM Distance: >3 FB  Neck ROM: full     Dental   No notable dental hx     Cardiovascular  Comment: Negative ROS, Cardiovascular exam normal    Pulmonary  Pulmonary exam normal     Other Findings        Anesthesia Plan  ASA Score- 3     Anesthesia Type- IV sedation with anesthesia with ASA Monitors  Additional Monitors:   Airway Plan:         Plan Factors-    Induction- intravenous  Postoperative Plan-     Informed Consent- Anesthetic plan and risks discussed with patient

## 2019-11-15 NOTE — DISCHARGE INSTRUCTIONS
Upper Endoscopy   WHAT YOU NEED TO KNOW:   An upper endoscopy is also called an upper gastrointestinal (GI) endoscopy, or an esophagogastroduodenoscopy (EGD)  You may feel bloated, gassy, or have some abdominal discomfort after your procedure  Your throat may be sore for 24 to 36 hours  You may burp or pass gas from air that is still inside your body  DISCHARGE INSTRUCTIONS:   Call 911 if:   · You have sudden chest pain or trouble breathing  Seek care immediately if:   · You feel dizzy or faint  · You have trouble swallowing  · You have severe throat pain  · Your bowel movements are very dark or black  · Your abdomen is hard and firm and you have severe pain  · You vomit blood  Contact your healthcare provider if:   · You feel full or bloated and cannot burp or pass gas  · You have not had a bowel movement for 3 days after your procedure  · You have neck pain  · You have a fever or chills  · You have nausea or are vomiting  · You have a rash or hives  · You have questions or concerns about your endoscopy  Relieve a sore throat:  Suck on throat lozenges or crushed ice  Gargle with a small amount of warm salt water  Mix 1 teaspoon of salt and 1 cup of warm water to make salt water  Relieve gas and discomfort from bloating:  Lie on your right side with a heating pad on your abdomen  Take short walks to help pass gas  Eat small meals until bloating is relieved  Rest after your procedure:  Do not drive or make important decisions until the day after your procedure  Return to your normal activity as directed  You can usually return to work the day after your procedure  Follow up with your healthcare provider as directed:  Write down your questions so you remember to ask them during your visits  © 2017 [employer] Information is for End User's use only and may not be sold, redistributed or otherwise used for commercial purposes   All illustrations and images included in CareNotes® are the copyrighted property of A [employer]  or Ji Goodman  The above information is an  only  It is not intended as medical advice for individual conditions or treatments  Talk to your doctor, nurse or pharmacist before following any medical regimen to see if it is safe and effective for you  Colonoscopy   WHAT YOU NEED TO KNOW:   A colonoscopy is a procedure to examine the inside of your colon (intestine) with a scope  Polyps or tissue growths may have been removed during your colonoscopy  It is normal to feel bloated and to have some abdominal discomfort  You should be passing gas  If you have hemorrhoids or you had polyps removed, you may have a small amount of bleeding  DISCHARGE INSTRUCTIONS:   Seek care immediately if:   · You have a large amount of bright red blood in your bowel movements  · Your abdomen is hard and firm and you have severe pain  · You have sudden trouble breathing  Contact your healthcare provider if:   · You develop a rash or hives  · You have a fever within 24 hours of your procedure  · You have not had a bowel movement for 3 days after your procedure  · You have questions or concerns about your condition or care  Activity:   · Do not lift, strain, or run  for 3 days after your procedure  · Rest after your procedure  You have been given medicine to relax you  Do not  drive or make important decisions until the day after your procedure  Return to your normal activity as directed  · Relieve gas and discomfort from bloating  by lying on your right side with a heating pad on your abdomen  You may need to take short walks to help the gas move out  Eat small meals until bloating is relieved  If you had polyps removed: For 7 days after your procedure:  · Do not  take aspirin  · Do not  go on long car rides  Help prevent constipation:   · Eat a variety of healthy foods    Healthy foods include fruit, vegetables, whole-grain breads, low-fat dairy products, beans, lean meat, and fish  Ask if you need to be on a special diet  Your healthcare provider may recommend that you eat high-fiber foods such as cooked beans  Fiber helps you have regular bowel movements  · Drink liquids as directed  Adults should drink between 9 and 13 eight-ounce cups of liquid every day  Ask what amount is best for you  For most people, good liquids to drink are water, juice, and milk  · Exercise as directed  Talk to your healthcare provider about the best exercise plan for you  Exercise can help prevent constipation, decrease your blood pressure and improve your health  Follow up with your healthcare provider as directed:  Write down your questions so you remember to ask them during your visits  © 2017 [facility] Information is for End User's use only and may not be sold, redistributed or otherwise used for commercial purposes  All illustrations and images included in CareNotes® are the copyrighted property of A [employer] A M [employer]  or Ji Goodman  The above information is an  only  It is not intended as medical advice for individual conditions or treatments  Talk to your doctor, nurse or pharmacist before following any medical regimen to see if it is safe and effective for you  Colorectal Polyps   WHAT YOU NEED TO KNOW:   Colorectal polyps are small growths of tissue in the lining of the colon and rectum  Most polyps are hyperplastic polyps and are usually benign (noncancerous)  Certain types of polyps, called adenomatous polyps, may turn into cancer  DISCHARGE INSTRUCTIONS:   Follow up with your healthcare provider or gastroenterologist as directed: You may need to return for more tests, such as another colonoscopy  Write down your questions so you remember to ask them during your visits    Reduce your risk for colorectal polyps:   · Eat a variety of healthy foods: Healthy foods include fruit, vegetables, whole-grain breads, low-fat dairy products, beans, lean meat, and fish  Ask if you need to be on a special diet  · Maintain a healthy weight:  Ask your healthcare provider if you need to lose weight and how much you need to lose  Ask for help with a weight loss program     · Exercise:  Begin to exercise slowly and do more as you get stronger  Talk with your healthcare provider before you start an exercise program      · Limit alcohol:  Your risk for polyps increases the more you drink  · Do not smoke: If you smoke, it is never too late to quit  Ask for information about how to stop  For support and more information:   · Hetal Parm[facility] ([facility]) [address] , [facility] [address]  Phone: [phone]  Web Address: www digestive  [facility]  Contact your healthcare provider or gastroenterologist if:   · You have a fever  · You have chills, a cough, or feel weak and achy  · You have abdominal pain that does not go away or gets worse after you take medicine  · Your abdomen is swollen  · You are losing weight without trying  · You have questions or concerns about your condition or care  Seek care immediately or call 911 if:   · You have sudden shortness of breath  · You have a fast heart rate, fast breathing, or are too dizzy to stand up  · You have severe abdominal pain  · You see blood in your bowel movement  © 2017 [address] [employer] Information is for End User's use only and may not be sold, redistributed or otherwise used for commercial purposes  All illustrations and images included in CareNotes® are the copyrighted property of [employer]  or Ji Goodman  The above information is an  only  It is not intended as medical advice for individual conditions or treatments   Talk to your doctor, nurse or pharmacist before following any medical regimen to see if it is safe and effective for you  Hiatal Hernia   WHAT YOU NEED TO KNOW:   What is a hiatal hernia? A hiatal hernia is a condition that causes part of your stomach to bulge through the hiatus (small opening) in your diaphragm  The part of the stomach may move up and down, or it may get trapped above the diaphragm  What increases my risk for a hiatal hernia? The exact cause of a hiatal hernia is not known  You may have been born with a large hiatus  The following may increase your risk of a hiatal hernia:  · Obesity    · Older age    · Medical conditions such as diverticulosis or esophagitis    · Previous surgery of the esophagus or stomach or trauma such as from a motor vehicle accident  What are the types of hiatal hernia? · Type I (sliding hiatal hernia): A portion of the stomach slides in and out of the hiatus  This type is the most common and usually causes gastroesophageal reflux disease (GERD)  GERD occurs when the esophageal sphincter does not close properly and causes acid reflux  The esophageal sphincter is the lower muscle of the esophagus  · Type II (paraesophageal hiatal hernia):  Type II hiatal hernia forms when a part of the stomach squeezes through the hiatus and lies next to the esophagus  · Type III (combined):  Type III hiatal hernia is a combination of a sliding and a paraesophageal hiatal hernia  · Type IV (complex paraesophageal hiatal hernia): The whole stomach, the small and large bowels, spleen, pancreas, or liver is pushed up into the chest   What are the signs and symptoms of a hiatal hernia? The most common symptom is heartburn  This usually occurs after meals and spreads to your neck, jaw, or shoulder   You may have no signs or symptoms, or you may have any of the following:  · Abdominal pain, especially in the area just above your navel    · Bitter or acid taste in your mouth    · Trouble swallowing    · Coughing or hoarseness    · Chest pain or shortness of breath that occurs after eating    · Frequent burping or hiccups    · Uncomfortable feeling of fullness after eating  How is a hiatal hernia diagnosed? · An upper GI series test  includes x-rays of your esophagus, stomach, and your small intestines  It is also called a barium swallow test  You will be given barium (a chalky liquid) to drink before the pictures are taken  This liquid helps your stomach and intestines show up better on the x-rays  An upper GI series can show if you have an ulcer, a blocked intestine, or other problems  · An endoscopy  uses a scope to see the inside of your digestive tract  A scope is a long, bendable tube with a light on the end of it  A camera may be hooked to the scope to take pictures  How is a hiatal hernia treated? Treatment depends on the type of hiatal hernia you have and on your symptoms  You may not need any treatment  You may need any of the following:  · Medicines  may be given to relieve heartburn symptoms  These medicines help to decrease or block stomach acid  You may also be given medicines that help to tighten the esophageal sphincter  · Surgery  may be done when medicines cannot control your symptoms, or other problems are present  Your healthcare provider may also suggest surgery depending on the type of hernia you have  Your healthcare provider can put your stomach back into its normal location  He may make the hiatus (hole) smaller and anchor your stomach in your abdomen  Fundoplication is a surgery that wraps the upper part of the stomach around the esophageal sphincter to strengthen it  How can I manage symptoms? The following nutrition and lifestyle changes may be recommended to relieve symptoms of heartburn  · Avoid foods that make your symptoms worse  These may include spicy foods, fruit juices, alcohol, caffeine, chocolate, and mint  · Eat several small meals during the day    Small meals give your stomach less food to digest     · Avoid lying down and bending forward after you eat  Do not eat meals 2 to 3 hours before bedtime  This decreases your risk for reflux  · Maintain a healthy weight  If you are overweight, weight loss may help relieve your symptoms  · Sleep with your head elevated  at least 6 inches  · Do not smoke  Smoking can increase your symptoms of heartburn  When should I seek immediate care? · You have severe abdominal pain  · You try to vomit but nothing comes out (retching)  · You have severe chest pain and sudden trouble breathing  · Your bowel movements are black or bloody  · Your vomit looks like coffee grounds or has blood in it  When should I contact my healthcare provider? · Your symptoms are getting worse  · You have nausea, and you are vomiting  · You are losing weight without trying  · You have questions or concerns about your condition or care  CARE AGREEMENT:   You have the right to help plan your care  Learn about your health condition and how it may be treated  Discuss treatment options with your caregivers to decide what care you want to receive  You always have the right to refuse treatment  The above information is an  only  It is not intended as medical advice for individual conditions or treatments  Talk to your doctor, nurse or pharmacist before following any medical regimen to see if it is safe and effective for you  © 2017 [address] Information is for End User's use only and may not be sold, redistributed or otherwise used for commercial purposes  All illustrations and images included in CareNotes® are the copyrighted property of A [employer] M [employer]  or Ji Horan   WHAT YOU NEED TO KNOW:   What is diverticulosis? Diverticulosis is a condition that causes small pockets called diverticula to form in your intestine   These pockets make it difficult for bowel movements to pass through your digestive system  What causes diverticulosis? Diverticula form when muscles have to work hard to move bowel movements through the intestine  The force causes bulges to form at weak areas in the intestine  This may happen if you eat foods that are low in fiber  Fiber helps give your bowel movements more bulk so they are larger and easier to move through your colon  The following may increase your risk of diverticulosis:  · A history of constipation    · Age 36 or older    · Obesity    · Lack of exercise  What are the signs and symptoms of diverticulosis? Diverticulosis usually does not cause any signs or symptoms  It may cause any of the following in some people:  · Pain or discomfort in your lower abdomen    · Abdominal bloating    · Constipation or diarrhea  How is diverticulosis diagnosed? Your healthcare provider will examine you and ask about your bowel movements, diet, and symptoms  He or she will also ask about any medical conditions you have or medicines you take  You may need any of the following:  · Blood tests  may be done to check for signs of inflammation  · A barium enema  is an x-ray of your colon that may show diverticula  A tube is put into your anus, and a liquid called barium is put through the tube  Barium is used so that healthcare providers can see your colon more clearly  · Flexible sigmoidoscopy  is a test to look for any changes in your lower intestines and rectum  It may also show the cause of any bleeding or pain  A soft, bendable tube with a light on the end will be put into your anus  It will then be moved forward into your intestine  · A colonoscopy  is used to look at your whole colon  A scope (long bendable tube with a light on the end) is used to take pictures  This test may show diverticula  · A CT scan , or CAT scan, may show diverticula  You may be given contrast liquid before the scan   Tell the healthcare provider if you have ever had an allergic reaction to contrast liquid  How is diverticulosis managed? The goal of treatment is to manage any symptoms you have and prevent other problems such as diverticulitis  Diverticulitis is swelling or infection of the diverticula  Your healthcare provider may recommend any of the following:  · Eat a variety of high-fiber foods  High-fiber foods help you have regular bowel movements  High-fiber foods include cooked beans, fruits, vegetables, and some cereals  Most adults need 25 to 35 grams of fiber each day  Your healthcare provider may recommend that you have more  Ask your healthcare provider how much fiber you need  Increase fiber slowly  You may have abdominal discomfort, bloating, and gas if you add fiber to your diet too quickly  You may need to take a fiber supplement if you are not getting enough fiber from food  · Medicines  to soften your bowel movements may be given  You may also need medicines to treat symptoms such as bloating and pain  · Drink liquids as directed  You may need to drink 2 to 3 liters (8 to 12 cups) of liquids every day  Ask your healthcare provider how much liquid to drink each day and which liquids are best for you  · Apply heat  on your abdomen for 20 to 30 minutes every 2 hours for as many days as directed  Heat helps decrease pain and muscle spasms  How can I help prevent diverticulitis or other symptoms? The following may help decrease your risk for diverticulitis or symptoms, such as bleeding  Talk to your provider about these or other things you can do to prevent problems that may occur with diverticulosis  · Exercise regularly  Ask your healthcare provider about the best exercise plan for you  Exercise can help you have regular bowel movements  Get 30 minutes of exercise on most days of the week  · Maintain a healthy weight  Ask your healthcare provider how much you should weigh  Ask him or her to help you create a weight loss plan if you are overweight  · Do not smoke  Nicotine and other chemicals in cigarettes increase your risk for diverticulitis  Ask your healthcare provider for information if you currently smoke and need help to quit  E-cigarettes or smokeless tobacco still contain nicotine  Talk to your healthcare provider before you use these products  · Ask your healthcare provider if it is safe to take NSAIDs  NSAIDs may increase your risk of diverticulitis  When should I seek immediate care? · You have severe pain on the left side of your lower abdomen  · Your bowel movements are bright or dark red  When should I contact my healthcare provider? · You have a fever and chills  · You feel dizzy or lightheaded  · You have nausea, or you are vomiting  · You have a change in your bowel movements  · You have questions or concerns about your condition or care  CARE AGREEMENT:   You have the right to help plan your care  Learn about your health condition and how it may be treated  Discuss treatment options with your caregivers to decide what care you want to receive  You always have the right to refuse treatment  The above information is an  only  It is not intended as medical advice for individual conditions or treatments  Talk to your doctor, nurse or pharmacist before following any medical regimen to see if it is safe and effective for you  © 2017 [address]  Information is for End User's use only and may not be sold, redistributed or otherwise used for commercial purposes  All illustrations and images included in CareNotes® are the copyrighted property of [employer]  or Ji Goodman

## 2019-11-19 ENCOUNTER — TELEPHONE (OUTPATIENT)
Dept: GASTROENTEROLOGY | Facility: MEDICAL CENTER | Age: 49
End: 2019-11-19

## 2019-11-19 NOTE — TELEPHONE ENCOUNTER
----- Message from Jo Ann Shea MD sent at 11/15/2019  3:21 PM EST -----  Can you schedule this patient for office followup with Dr Kristina Winter or a physician assistant in 2-3 weeks?

## 2019-11-25 NOTE — RESULT ENCOUNTER NOTE
My medical assistant will call patient with results  Small bowel and stomach biopsies showed mild inflammation from acid  There was no evidence of infection  Continue to take daily protonix 40mg   Avoid medicines like Ibuprofen/Motrin/Aleve  We will repeat the EGD in 6 months  The colon biopsies showed mild, non specific inflammation  The polyps removed during the colonoscopy was called a tubular adenoma  This is a pre-cancerous lesion and was completely removed  There was no evidence of cancer in the polyp       she should have the colonoscopy repeated in 3 years due to a history of colon polyps

## 2019-11-26 ENCOUNTER — OFFICE VISIT (OUTPATIENT)
Dept: GASTROENTEROLOGY | Facility: MEDICAL CENTER | Age: 49
End: 2019-11-26
Payer: COMMERCIAL

## 2019-11-26 ENCOUNTER — TELEPHONE (OUTPATIENT)
Dept: GASTROENTEROLOGY | Facility: AMBULARY SURGERY CENTER | Age: 49
End: 2019-11-26

## 2019-11-26 VITALS
HEART RATE: 78 BPM | SYSTOLIC BLOOD PRESSURE: 110 MMHG | TEMPERATURE: 97.6 F | DIASTOLIC BLOOD PRESSURE: 78 MMHG | WEIGHT: 263 LBS | BODY MASS INDEX: 42.27 KG/M2 | HEIGHT: 66 IN

## 2019-11-26 DIAGNOSIS — K31.89 MUCOSAL ABNORMALITY OF STOMACH: ICD-10-CM

## 2019-11-26 DIAGNOSIS — K29.80 DUODENITIS: Primary | ICD-10-CM

## 2019-11-26 DIAGNOSIS — R10.10 PAIN OF UPPER ABDOMEN: ICD-10-CM

## 2019-11-26 DIAGNOSIS — R14.0 BLOATING: ICD-10-CM

## 2019-11-26 DIAGNOSIS — R19.5 LOOSE STOOLS: ICD-10-CM

## 2019-11-26 PROCEDURE — 99214 OFFICE O/P EST MOD 30 MIN: CPT | Performed by: PHYSICIAN ASSISTANT

## 2019-11-26 RX ORDER — ESOMEPRAZOLE MAGNESIUM 40 MG/1
40 CAPSULE, DELAYED RELEASE ORAL
Qty: 180 CAPSULE | Refills: 2 | Status: SHIPPED | OUTPATIENT
Start: 2019-11-26 | End: 2020-07-07

## 2019-11-26 NOTE — TELEPHONE ENCOUNTER
Patients GI provider:  Dr Crews    Number to return call: (    Reason for call: Pt pharmacy called requesting prior authoriztion for both medications  Esomeprazole and probiotic    Scheduled procedure/appointment date if applicable: Apt/procedure   n/a

## 2019-11-26 NOTE — PROGRESS NOTES
[facility] - Outpatient Follow-up Note  Brenda Moore 54 y o  female MRN: [id number]  Encounter: [id number]          ASSESSMENT AND PLAN:    1  Gastric mucosal abnormality  2  Nodular duodenitis   -she had an upper endoscopy earlier this month showing moderate, generalized nodular mucosa with enlarged, abnormal folds in the stomach as well as a hiatal hernia and Schatzki's ring which was widely patent  There were also 2 submucosal appearing nodules in the 2nd part of the duodenum, biopsies showed nodular peptic duodenitis    -she was recommended to continue Protonix 40 mg twice daily and repeat the upper endoscopy in 6 months   -she continues to have a burning sensation in her upper abdomen when she eats, she feels like the Protonix helped but did not resolve her symptoms  -we discussed several possible treatment options and she would like to change PPI medications, start Nexium 40 mg twice daily and discontinue Protonix    -She will schedule the upper endoscopy   -Avoid NSAIDs   -follow-up in the office after the procedure    3  Abdominal pain  4  Bloating  5  Loose stool   -she reports that she continues to have abdominal pain and bloating after eating, she states that she has loose/soft stool but denies diarrhea  - She had a colonoscopy with random colon biopsies including from the terminal ileum being benign without evidence of microscopic colitis  There was a small area of focal acute colitis identified in the descending colon  She also had a polyp removed from the ascending colon which was a tubular adenoma so repeat was recommended in 3 years  She did have 2 other polyps removed, these were inflammatory polyps    Internal hemorrhoids and mild diverticulosis also seen    -she had a CT scan showing cholelithiasis without evidence of cholecystitis and a kidney stone but this was otherwise normal    -she also had a HIDA scan that was normal, she is following up with a surgeon regarding the gallstones however    -her fecal calprotectin was elevated at 176, will discuss if repeat is indicated with Dr Júnior Sampson   -stool studies for infectious cause of diarrhea including C diff, stool enteric bacterial panel, ova and parasite and Giardia/Cryptosporidium tests were all negative  -recommend starting a probiotic, she could also try the low FODMAP diet for 2 weeks and if this is effective she could reintroduce foods high iron FODMAP so 1 at a time to identify her trigger foods  If she does not have improvement in her symptoms, she can abandon this  -follow-up in the office in a few months to see how she is doing  ____________________________________________________________    SUBJECTIVE:    82-year-old female past medical history of GERD, PCOS presents to the office for follow-up  She reports she does continue to have intermittent abdominal pain that she describes as a burning and radiating to her shoulder blades after eating, she has seen a surgeon regarding this soon as she has gallstones  She did have a normal HIDA scan  She states she feels bloated after eating and also notes that she has soft/loose stools at times but denies any diarrhea  She denies constipation  She denies any new medications or supplements  She feels that the Protonix help with the burning upper abdominal pain however it did not completely resolve this  She denies any nausea, vomiting, difficulty swallowing, hematochezia, melena, change in appetite, unintended weight loss or jaundice  She is a former smoker  She denies any alcohol consumption  REVIEW OF SYSTEMS IS OTHERWISE NEGATIVE        Historical Information   Past Medical History:   Diagnosis Date    GERD (gastroesophageal reflux disease)     History of transfusion     Kidney stone     PCOS (polycystic ovarian syndrome)      Past Surgical History:   Procedure Laterality Date    DILATION AND CURETTAGE OF UTERUS      D&E    EGD       Social History Social History     Substance and Sexual Activity   Alcohol Use No     Social History     Substance and Sexual Activity   Drug Use No     Social History     Tobacco Use   Smoking Status Former Smoker   Smokeless Tobacco Never Used     Family History   Problem Relation Age of Onset    Ovarian cancer Mother        Meds/Allergies       Current Outpatient Medications:     cetirizine (ZyrTEC) 10 mg tablet    cholecalciferol (VITAMIN D3) 1,000 units tablet    dicyclomine (BENTYL) 10 mg capsule    ferrous sulfate 325 (65 Fe) mg tablet    ondansetron (ZOFRAN-ODT) 4 mg disintegrating tablet    pantoprazole (PROTONIX) 40 mg tablet    Allergies   Allergen Reactions    Penicillins Hives           Objective     Last menstrual period 11/08/2019  PHYSICAL EXAM:      Physical Exam   Constitutional: She is oriented to person, place, and time  She appears well-developed  No distress  Morbidly obese   HENT:   Head: Normocephalic and atraumatic  Eyes: Right eye exhibits no discharge  Left eye exhibits no discharge  No scleral icterus  Neck: Neck supple  No tracheal deviation present  Cardiovascular: Normal rate, regular rhythm and normal heart sounds  Exam reveals no gallop and no friction rub  No murmur heard  Pulmonary/Chest: Effort normal  No respiratory distress  She has no wheezes  She has no rales  Abdominal: Soft  Bowel sounds are normal  She exhibits no distension  There is no tenderness  There is no rebound and no guarding  Neurological: She is alert and oriented to person, place, and time  Skin: Skin is warm and dry  Psychiatric: She has a normal mood and affect  Lab Results:   No visits with results within 1 Day(s) from this visit     Latest known visit with results is:   Hospital Outpatient Visit on 11/15/2019   Component Date Value    EXT Preg Test, Ur 11/15/2019 Negative     Control 11/15/2019 Valid     Case Report 11/15/2019                      Value:Surgical Pathology Report                         Case: [id number]                                   Authorizing Provider:  Alanna Garay MD       Collected:           11/15/2019 1236              Ordering Location:     [facility]        Received:            11/15/2019 3 [facility]                                                          Pathologist:           Luca Uriostegui MD                                                          Specimens:   A) - Duodenum, bx, R/O celiac                                                                       B) - Duodenum, nodule bx, R/O adenoma                                                               C) - Duodenum, polypoid lesion bx                                                                   D) - Stomach, bx, R /O H  Pylori                                                                    E) - Stomach, bx, R/O tumor                                                                                                   F) - Large Intestine, Left/Descending Colon, bx                                                     G) - Terminal Ileum, bx                                                                             H) - Colon, random colon bx, R/O microscopic colitis                                                I) - Large Intestine, Right/Ascending Colon, polyp                                                  J) - Large Intestine, Left/Descending Colon, polyp                                                  K) - Rectum, polyp                                                                         Final Diagnosis 11/15/2019                      Value: This result contains rich text formatting which cannot be displayed here   Additional Information 11/15/2019                      Value: This result contains rich text formatting which cannot be displayed here      Synoptic Checklist 11/15/2019                      Value: (COLON/RECTUM POLYP FORM-GI - All Specimens)                                                                                                                 : Adenoma(s)     Leslie Rodas Description 11/15/2019                      Value: This result contains rich text formatting which cannot be displayed here  Radiology Results:   Ct Abdomen W Contrast    Result Date: 11/12/2019  Narrative: CT ABDOMEN WITH IV CONTRAST INDICATION:   Left upper quadrant from abdominal pain  COMPARISON: None  TECHNIQUE:  CT examination of the abdomen was performed  Axial, sagittal, and coronal 2D reformatted images were created from the source data and submitted for interpretation  Radiation dose length product (DLP) for this visit:  780 mGy-cm   This examination, like all CT scans performed in the [facility], was performed utilizing techniques to minimize radiation dose exposure, including the use of iterative reconstruction and automated exposure control  IV Contrast:  100 mL of iohexol (OMNIPAQUE) Enteric Contrast:  Enteric contrast was not administered  FINDINGS: ABDOMEN LOWER CHEST:  No clinically significant abnormality identified in the visualized lower chest  LIVER/BILIARY TREE:  Unremarkable  GALLBLADDER:  Cholelithiasis SPLEEN:  Unremarkable  PANCREAS:  Unremarkable  ADRENAL GLANDS:  Unremarkable  KIDNEYS/URETERS:  There is left kidney upper pole 2 mm nonobstructing calculus (series 2 image 29) right kidney midpole well-defined subcentimeter hypoattenuating lesion which is too small to characterize by CT, but statistically most favors benign cyst  VISUALIZED STOMACH AND BOWEL:  Unremarkable  ABDOMINAL CAVITY:  No ascites or free intraperitoneal air  No lymphadenopathy  Partially imaged postprocedural changes in the pelvis VESSELS:  Unremarkable for patient's age  ABDOMINAL WALL:  Unremarkable  OSSEOUS STRUCTURES:  No acute fracture or destructive osseous lesion  Impression: 1    No CT evidence of acute inflammatory process within the abdomen  2   Left kidney upper pole 2 mm nonobstructing calculus   3   Cholelithiasis Workstation performed: [id number]

## 2019-11-27 ENCOUNTER — TELEPHONE (OUTPATIENT)
Dept: GASTROENTEROLOGY | Facility: MEDICAL CENTER | Age: 49
End: 2019-11-27

## 2019-11-27 DIAGNOSIS — R10.84 GENERALIZED ABDOMINAL PAIN: Primary | ICD-10-CM

## 2019-11-27 NOTE — TELEPHONE ENCOUNTER
I called her and let her know Dr Rodeny Morel recommendations, she will repeat the fecal calprotectin test     ----- Message from Michael Caro MD sent at 11/27/2019 11:06 AM EST -----  I would go based on patient's symptoms if she still continues of IBS D like symptoms I would strongly advised her to check another fecal calprotectin is elevated we could try enemas  She would follow with us as an outpatient we could repeat scope between 1-3 years  Thank  ----- Message -----  From: Nayeli Marrero PA-C  Sent: 11/26/2019  12:26 PM EST  To: Michael Caro MD    I saw this patient the office today, her fecal calprotectin was elevated at 176 and on her colonoscopy she had 1 area of inflammation with possible overlying ulceration in the ascending colon which showed focal acute colitis on biopsy  Do you recommend repeating the fecal calprotectin? I'm not really sure what to make of her results

## 2019-12-10 ENCOUNTER — CLINICAL SUPPORT (OUTPATIENT)
Dept: BARIATRICS | Facility: CLINIC | Age: 49
End: 2019-12-10

## 2019-12-10 ENCOUNTER — CONSULT (OUTPATIENT)
Dept: SURGERY | Facility: MEDICAL CENTER | Age: 49
End: 2019-12-10
Payer: COMMERCIAL

## 2019-12-10 VITALS
WEIGHT: 263.5 LBS | TEMPERATURE: 97.5 F | HEIGHT: 67 IN | SYSTOLIC BLOOD PRESSURE: 124 MMHG | HEART RATE: 100 BPM | BODY MASS INDEX: 41.36 KG/M2 | DIASTOLIC BLOOD PRESSURE: 64 MMHG

## 2019-12-10 VITALS
BODY MASS INDEX: 42.19 KG/M2 | HEART RATE: 80 BPM | DIASTOLIC BLOOD PRESSURE: 76 MMHG | TEMPERATURE: 97.5 F | SYSTOLIC BLOOD PRESSURE: 120 MMHG | HEIGHT: 67 IN | WEIGHT: 268.8 LBS | RESPIRATION RATE: 16 BRPM

## 2019-12-10 DIAGNOSIS — K80.20 CALCULUS OF GALLBLADDER WITHOUT CHOLECYSTITIS WITHOUT OBSTRUCTION: Primary | ICD-10-CM

## 2019-12-10 DIAGNOSIS — E66.01 MORBID OBESITY (HCC): Primary | ICD-10-CM

## 2019-12-10 DIAGNOSIS — Z87.42 HISTORY OF POLYCYSTIC OVARIAN SYNDROME: ICD-10-CM

## 2019-12-10 DIAGNOSIS — E66.01 OBESITY, CLASS III, BMI 40-49.9 (MORBID OBESITY) (HCC): Primary | ICD-10-CM

## 2019-12-10 PROCEDURE — 99243 OFF/OP CNSLTJ NEW/EST LOW 30: CPT | Performed by: SURGERY

## 2019-12-10 PROCEDURE — RECHECK

## 2019-12-10 RX ORDER — PANTOPRAZOLE SODIUM 40 MG/1
40 TABLET, DELAYED RELEASE ORAL 2 TIMES DAILY
Refills: 2 | COMMUNITY
Start: 2019-11-27 | End: 2020-07-07

## 2019-12-10 NOTE — PROGRESS NOTES
Bariatric Behavioral Health Evaluation    Presenting Problem: 54year old female (1970) here for behavioral health evaluation  Patient wants to improve her health, increase energy, and prevent future health problems  Is the patient seeking Bariatric Surgery Eval? Yes  If yes how long have you researched this surgery option  Patient reports that she has been looking into bariatric surgery for about 2 years  Patient reports exhausting all other options  Patient reports that she went to [facility] for [facility] in 2017, but was unable to lose a significant amount of weight  Realizes Post- Op Requirements? Yes     Pre-morbid level of function and history of present illness: Patient reports that she started to gain weight when she was about 37 and has been gaining since without being able to lose any weight  Psychiatric/Psychological Treatment Diagnosis: Patient denies axis 1 diagnosis or treatment  Outpatient Counselor No      Psychiatrist No     Have you had Inpatient Treatment? No    Family Constellation (include relationship with each and Psych/Med HX)    Mother  obesity and tobacco use    Domestic Violence No    Abuse History:  Patient denies this    Additional comments/stressors related to family/relationships/peer support: Patient identifies her  and her teenage daughter as her support  Patient identifies "not being able to do the things that I want to do" as a stressor       Physical/Psychological Assessment:     Appearance: appropriate  Sociability: average  Affect: appropriate  Mood: calm  Thought Process: coherent  Speech: normal  Content: no impairment  Orientation: person  Yes , place  Yes , time  Yes , normal attention span  Yes , normal memory  Yes   and normal judgement  Yes   Insight: emotional  good    Risk Assessment:     none    Recommendations: Recommended for surgery  yes    Risk of Harm to Self or Others: Patient denies SI or HI    Observation:     Interviews This interview only    Access to weapons no     Based on the previous information, the client presents the following risk of harm to self or others: low     Note: Patient denies axis 1 diagnosis or treatment  Patient educated on the impact of nicotine and alcohol on the post bariatric patient  Patient meets criteria for surgery at this program and is referred to surgeon  BARIATRIC SURGERY EDUCATION CHECKLIST    I have received education related to my bariatric surgery process and understand:    Patients may be required to complete a psychiatric evaluation and receive clearance for surgery from their psychiatrist     Patients who undergo weight loss surgery are at higher risk of increased mental health concerns and suicide attempts  Patients may be required to complete a full substance abuse evaluation and then complete all treatment recommendations prior to surgery  If diagnosis of abuse/dependence results, patient may be required to remain sober for one (1) year before having bariatric surgery  Patients on psychiatric medications should check with their provider to discuss psychiatric medications and the changes in absorption  Patient should discuss all time release medications with provider and take all medications as prescribed  The recommendation is that there is no use of  any tobacco products, Hookah or  vapes for the bariatric post-operation patient  Bariatric surgery patients should not consume alcohol as a post-operative patient as it may increase risk of numerous health conditions including but not limited to alcohol abuse and ulcers  There is a possibility of weight regain if patient does not follow all program guidelines and recommendations  Bariatric surgery patients should exercise thirty (30) to sixty (60) minutes per day to maintain post-surgical weight loss      Research indicates that bariatric patients are more successful when they see a therapist for up to two (2) years post-op  Patients will follow all medical and dietary recommendations provided  Patient will keep all scheduled appointments and follow up with their physician for a minimum of five (5) years  Patient will take all vitamins as recommended  Post-operative vitamins are life-long  Patient reviewed Bariatric Surgery Education Checklist and agrees they have received education on these issues

## 2019-12-10 NOTE — PROGRESS NOTES
Bariatric Nutrition Assessment Note    Type of surgery    Preop  Surgery Date: patient has 6 month program requirement     Surgeon: Dr Andre Mcfadden  54 y o   female     Wt with BMI of 25: 161 2#  Pre-Op Excess Wt: 102 3#  /64 (BP Location: Right arm, Patient Position: Sitting)   Pulse 100   Temp 97 5 °F (36 4 °C) (Tympanic)   Ht 5' 7 25" (1 708 m)   Wt 120 kg (263 lb 8 oz)   BMI 40 96 kg/m²     [place]- [place]  [place] Equation:  BMR :  1867 kcal per day  Estimated calories for weight loss = 5347-0614 kcal per day (1-2# per wk wt loss - sedentary )  Estimated protein needs = 73-90 grams per day ( 1 0-1 2 grams /k g OBW)  Estimated fluid needs = 2564 kcal per day / 85 ounces per day ( 35 mg/kg IBW )     Weight History   Onset of Obesity: Childhood  Family history of obesity: Yes- mother   Wt Loss Attempts: Counseling with  MD  Exercise  FAD Diets (Cabbage soup, Grapefruit, Cleanse, etc )  High Protein/Low CHO diets (Atkins, Union, etc )  Meal Replacements (Medifast, Slim Fast, etc )  Nutrition Counseling with RD  phenteramine prescribed by her doctor, twice     Patient has tried the above for 6 months or more with insufficient weight loss or weight regain, which is why patient has requested to be evaluated for weight loss surgery today  Maximum Wt Lost: 5 pounds       Review of History and Medications   Past Medical History:   Diagnosis Date    GERD (gastroesophageal reflux disease)     History of transfusion     Kidney stone     PCOS (polycystic ovarian syndrome)      Past Surgical History:   Procedure Laterality Date    COLONOSCOPY  11/2019    DILATION AND CURETTAGE OF UTERUS      D&E    EGD      UPPER GASTROINTESTINAL ENDOSCOPY  11/2019     Social History     Socioeconomic History    Marital status: /Civil Union     Spouse name: None    Number of children: None    Years of education: None    Highest education level: None   Occupational History  None   Social Needs    Financial resource strain: None    Food insecurity:     Worry: None     Inability: None    Transportation needs:     Medical: None     Non-medical: None   Tobacco Use    Smoking status: Former Smoker    Smokeless tobacco: Never Used   Substance and Sexual Activity    Alcohol use: No    Drug use: No    Sexual activity: Yes     Partners: Male     Birth control/protection: Female Sterilization     Comment: Rashida 2010   Lifestyle    Physical activity:     Days per week: None     Minutes per session: None    Stress: None   Relationships    Social connections:     Talks on phone: None     Gets together: None     Attends Gnosticist service: None     Active member of club or organization: None     Attends meetings of clubs or organizations: None     Relationship status: None    Intimate partner violence:     Fear of current or ex partner: None     Emotionally abused: None     Physically abused: None     Forced sexual activity: None   Other Topics Concern    None   Social History Narrative    None       Current Outpatient Medications:     cetirizine (ZyrTEC) 10 mg tablet, Take 10 mg by mouth daily, Disp: , Rfl:     cholecalciferol (VITAMIN D3) 1,000 units tablet, Take 1,000 Units by mouth daily, Disp: , Rfl:     ferrous sulfate 325 (65 Fe) mg tablet, ferrous sulfate 325 mg (65 mg iron) tablet, Disp: , Rfl:     pantoprazole (PROTONIX) 40 mg tablet, Take 40 mg by mouth 2 (two) times a day, Disp: , Rfl: 2    dicyclomine (BENTYL) 10 mg capsule, Take 1 capsule (10 mg total) by mouth 2 (two) times a day as needed (For abdominal pain) (Patient not taking: Reported on 12/10/2019), Disp: 60 capsule, Rfl: 4    esomeprazole (NexIUM) 40 MG capsule, Take 1 capsule (40 mg total) by mouth 2 (two) times a day before meals (Patient not taking: Reported on 12/10/2019), Disp: 180 capsule, Rfl: 2    ondansetron (ZOFRAN-ODT) 4 mg disintegrating tablet, Take 1 tablet (4 mg total) by mouth every 6 (six) hours as needed for nausea or vomiting (Patient not taking: Reported on 12/10/2019), Disp: 20 tablet, Rfl: 0    Probiotic Product (VISBIOME PROBIOTIC HIGH POT) CAPS, Take 1 capsule by mouth daily (Patient not taking: Reported on 12/10/2019), Disp: 30 capsule, Rfl: 6  Food Intake and Lifestyle Assessment   Food Intake Assessment completed via food log brought by patient  Breakfast: 9 am :  Eggs , trujillo, toast , unsweetened tea   Snack: 0   Lunch: 12 to 2 pm salad with HB eggs with chicken or shrimp OR ham and cheese sandwich   Snack: chips   Dinner: chicken or steak and vegetables with potatoes   Snack: ice cream , cookies, donuts   Beverage intake: water and regular soda  Protein supplement: regular soda, water, unsweetened tea   Estimated protein intake per day: 60-70 grams   Estimated fluid intake per day: 50 ounce per day   Meals eaten away from home: 2-3 times per day   Typical meal pattern: 3 meals per day and 1-2 snacks per day  Eating Behaviors: Mindless eating, Emotional eating, Craves sweet foods and Craves salty foods  Food allergies or intolerances: Allergies   Allergen Reactions    Penicillins Hives     Cultural or Roman Catholic considerations: N/A     Physical Assessment  Physical Activity  Types of exercise: gym - 2-3 times per week for 30 minutes   Current physical limitations: fatigue     Psychosocial Assessment   Support systems: spouse children  Socioeconomic factors: lives with  and 12year old   Currently not working     Nutrition Diagnosis  Diagnosis: Overweight / Obesity (NC-3 3)  Related to: Physical inactivity and Excessive energy intake  As Evidenced by: BMI >25 and Excessive energy intake     Nutrition Prescription: Recommend the following diet  Regular    Interventions and Teaching   Discussed pre-op and post-op nutrition guidelines  Patient educated and handouts provided    Surgical changes to stomach / GI  Capacity of post-surgery stomach  Diet progression  Adequate hydration  Sugar and fat restriction to decrease "dumping syndrome"  Fat restriction to decrease steatorrhea  Expected weight loss  Weight loss plateaus/ possibility of weight regain  Exercise  Suggestions for pre-op diet  Nutrition considerations after surgery  Protein supplements  Meal planning and preparation  Appropriate carbohydrate, protein, and fat intake, and food/fluid choices to maximize safe weight loss, nutrient intake, and tolerance   Dietary and lifestyle changes  Possible problems with poor eating habits  Intuitive eating  Techniques for self monitoring and keeping daily food journal  Potential for food intolerance after surgery, and ways to deal with them including: lactose intolerance, nausea, reflux, vomiting, diarrhea, food intolerance, appetite changes, gas  Vitamin / Mineral supplementation of Multivitamin with minerals, Calcium, Vitamin B12, Iron, Fat Soluble vitamins, Vitamin D and instructed patient to continue loading dose of vitamin D and to start taking an adult multivitamin and mineral supplement daily     Patient provided with gym coupon for [employer] Assessment: Yes    Education provided to: patient    Barriers to learning: No barriers identified  Readiness to change: preparation    Prior research on procedure: discussed with provider, pre-op class and friends or family    Comprehension: verbalizes understanding     Expected Compliance: good  Recommendations  Pt is an appropriate candidate for surgery   Yes    Evaluation / Monitoring  Dietitian to Monitor: Eating pattern as discussed Body weight Physical activity    Goals  Eliminate sugar sweetened beverages, Food journal, Exercise 30 minutes 5 times per week, Complete lession plans 1-6, Eat 3 meals per day and Eliminate mindless snacking   Take an adult multivitamin and mineral supplement and continue with loading dose of vitamin D2     Time Spent:   1 Hour

## 2019-12-10 NOTE — PROGRESS NOTES
Assessment/Plan: Emiliano Nix is a 54year old female who presents today per referral by Dr Joseph Said regarding cholelithiasis  CT scan done on 11/01/19 showed cholelithiasis without evidence of cholecystitis and a kidney stone  Cassi Marshall has a history of GERD and PCOS  Patient had a gallbladder attack in March  Explained the etiology of cholelithiasis  I explained to the patient that some of her symptoms are GI related and surgery will not alleviate these symtpoms  Discussed the risks, benefits, and alternatives to laparoscopic cholecystectomy  Explained the procedure as well as pre- and post-procedural protocols and restrictions  Lifting restrictions of no greater than 20 pounds and no strenuous activity for a full month after surgery  She should not drive or return to work while he is taking the narcotics  The office is going to schedule her for the procedure  She knows to contact the office if any questions or concerns arise  Patient recently had colonoscopy and random colon biopsies including from the terminal ileum being benign without evidence of microscopic colitis  There was a small area of focal acute colitis identified in the descending colon  She also had a polyp removed from the ascending colon which was a tubular adenoma so repeat was recommended in 3 years  She did have 2 other polyps removed, these were inflammatory polyps  Internal hemorrhoids and mild diverticulosis also seen  Upper endoscopy done on 11/15/19 showed generalized nodular mucosa with enlarged, abnormal folds in the stomach as well as a hiatal hernia and Schatzki's ring which was widely patent  There were also 2 submucosal appearing nodules in the 2nd part of the duodenum, biopsies showed nodular peptic duodenitis  Obesity- Patient is obese with BMI of 41 79  Weight loss in context of diet was exercise was provided  No problem-specific Assessment & Plan notes found for this encounter         Diagnoses and all orders for this visit:    Calculus of gallbladder without cholecystitis without obstruction  -     Case request operating room: CHOLECYSTECTOMY LAPAROSCOPIC; Standing  -     Case request operating room: CHOLECYSTECTOMY LAPAROSCOPIC  -     Reason for no Mechanical VTE Prophylaxis; Standing  -     lactated ringers infusion  -     ceFAZolin (ANCEF) 3,000 mg in dextrose 5 % 100 mL IVPB    Other orders  -     Diet NPO; Sips with meds; Standing  -     Apply Sequential Compression Device; Standing  -     Place sequential compression device; Standing          Subjective:      Patient ID: Iram Wright is a 54 y o  female  Iram Wright is a 54year old female who presents today per referral by Dr Pramod Haq regarding cholelithiasis  Patient reports that in March she had a gallbladder attack  She was hospitalized at [facility] for a few days  Prior to this she has never had any abdominal issues or symtpoms  Since March, she's been having pain across her lower abdomen  She reports that some days are good and some days are bad  She has stomach pain almost every day  She also reports feeling bloated after almost every meal  She also noted that her stools are loose, ever since she had the gallbladder attack  Patient also reports nausea and joint pain  She states that greasy food triggers her symptoms, she tries not to eat greasy food  She is taking Protonix  Patient states that she is considering having bariatric surgery and wants to lose weight but it's hard for her  The following portions of the patient's history were reviewed and updated as appropriate: allergies, current medications, past family history, past medical history, past social history, past surgical history and problem list     Review of Systems   Constitutional: Negative  HENT: Negative  Eyes: Negative  Respiratory: Negative  Cardiovascular: Negative  Gastrointestinal: Positive for abdominal distention and abdominal pain          Loose stools Endocrine: Negative  Genitourinary: Negative  Musculoskeletal:        Joint pain   Skin: Negative  Allergic/Immunologic: Negative  Neurological: Negative  Hematological: Negative  Psychiatric/Behavioral: Negative  Objective:      /76   Pulse 80   Temp 97 5 °F (36 4 °C)   Resp 16   Ht 5' 7 25" (1 708 m)   Wt 122 kg (268 lb 12 8 oz)   BMI 41 79 kg/m²          Physical Exam   Constitutional: She appears well-developed and well-nourished  No distress  Cardiovascular: Normal rate, regular rhythm, normal heart sounds and intact distal pulses  Pulmonary/Chest: Effort normal and breath sounds normal  No stridor  No respiratory distress  Abdominal: Soft  Bowel sounds are normal  She exhibits no distension and no mass  There is tenderness  There is no rebound and no guarding  Skin: She is not diaphoretic  Nursing note and vitals reviewed  By signing my name below, Stefany Aaron, attest that this documentation has been prepared under the direction and in the presence of James Schlatter, MD  Electronically Signed: Ruddy Mccall  12/10/19  Higinio Woodward, personally performed the services described in this documentation  All medical record entries made by the scribe were at my direction and in my presence  I have reviewed the chart and discharge instructions and agree that the record reflects my personal performance and is accurate and complete  James Schlatter, MD  12/10/19

## 2019-12-20 RX ORDER — SODIUM CHLORIDE, SODIUM LACTATE, POTASSIUM CHLORIDE, CALCIUM CHLORIDE 600; 310; 30; 20 MG/100ML; MG/100ML; MG/100ML; MG/100ML
125 INJECTION, SOLUTION INTRAVENOUS CONTINUOUS
Status: CANCELLED | OUTPATIENT
Start: 2020-01-09

## 2020-01-10 ENCOUNTER — OFFICE VISIT (OUTPATIENT)
Dept: BARIATRICS | Facility: CLINIC | Age: 50
End: 2020-01-10
Payer: COMMERCIAL

## 2020-01-10 VITALS
SYSTOLIC BLOOD PRESSURE: 118 MMHG | HEIGHT: 67 IN | BODY MASS INDEX: 42.38 KG/M2 | DIASTOLIC BLOOD PRESSURE: 78 MMHG | WEIGHT: 270 LBS | RESPIRATION RATE: 18 BRPM | HEART RATE: 98 BPM | TEMPERATURE: 97.9 F

## 2020-01-10 DIAGNOSIS — Z87.42 HISTORY OF POLYCYSTIC OVARIAN SYNDROME: ICD-10-CM

## 2020-01-10 DIAGNOSIS — E66.01 MORBID OBESITY (HCC): Primary | ICD-10-CM

## 2020-01-10 PROCEDURE — 99214 OFFICE O/P EST MOD 30 MIN: CPT | Performed by: SURGERY

## 2020-01-10 NOTE — PROGRESS NOTES
BARIATRIC CONSULT-INITIAL - BARIATRIC SURGERY  Merline De Dios 54 y o  female MRN: [id number]  Unit/Bed#:  Encounter: [id number]      HPI:  Paola Mckeon is a 54 y o  female who presents with morbid obesity to discuss weight loss options  Review of Systems    Historical Information   Past Medical History:   Diagnosis Date    GERD (gastroesophageal reflux disease)     History of transfusion     Kidney stone     PCOS (polycystic ovarian syndrome)      Past Surgical History:   Procedure Laterality Date    COLONOSCOPY  11/2019    DILATION AND CURETTAGE OF UTERUS      D&E    EGD      UPPER GASTROINTESTINAL ENDOSCOPY  11/2019     Social History   Social History     Substance and Sexual Activity   Alcohol Use No     Social History     Substance and Sexual Activity   Drug Use No     Social History     Tobacco Use   Smoking Status Former Smoker   Smokeless Tobacco Never Used     Family History: non-contributory    Meds/Allergies   all medications and allergies reviewed  Allergies   Allergen Reactions    Penicillins Hives       Objective       Current Vitals:   Blood Pressure: 118/78 (01/10/20 1324)  Pulse: 98 (01/10/20 1324)  Temperature: 97 9 °F (36 6 °C) (01/10/20 1324)  Temp Source: Tympanic (01/10/20 1324)  Respirations: 18 (01/10/20 1324)  Height: 5' 7 2" (170 7 cm) (01/10/20 1324)  Weight - Scale: 122 kg (270 lb) (01/10/20 1324)  Body mass index is 42 04 kg/m²  Invasive Devices     None                 Physical Exam    Lab Results: I have personally reviewed pertinent lab results  Imaging: I have personally reviewed pertinent reports  EKG, Pathology, and Other Studies: I have personally reviewed pertinent reports  Code Status: [unfilled]  Advance Directive and Living Will:      Power of :    POLST:      Assessment/PLAN:            Patient has a long history of morbid obesity and is presenting to discuss the surgical weight loss options   Despite the patient best efforts patient was unable to lose any meaningful or sustainable weight using nonsurgical means  We had a long discussion regarding all the surgical weight-loss options at our disposal at this point and reviewed the risks and benefits of each procedure in details as it relates to her age, BMI and medical conditions  Patient elected to undergo a sleeve gastrectomy    Risks and benefits were explained to the patient  We also discussed the importance and need of a preoperative workup to make sure that the patient can undergo the procedure safely  Preoperative workup includes sleep apnea screening, cardiac evaluation, nutrition/psych and preoperative EGD  Risks and benefits of all the preoperative diagnostic tests were discussed with the patient including but not limited to the upper endoscopy  Alternatives to surgery and alternative forms of surgery were also explained  Postsurgical commitment and aftercare programs were discussed and explained to the patient in details   In terms of comorbidities patient suffers mostly of   Past Medical History:   Diagnosis Date    GERD (gastroesophageal reflux disease)     History of transfusion     Kidney stone     PCOS (polycystic ovarian syndrome)        I informed the patient that the rate of resolution of comorbid conditions following weight loss surgery is between 60 and 90% depending on the severity of the specific medical condition  I discussed and educated the patient regarding the different components of our multidisciplinary program and the importance of compliance and follow-up in the postoperative period  All questions answered  Patient understands risks and benefits  A videotape of the procedure was also shown to the patient  After showing the video we discussed all the technical aspects of the procedure and also the potential complications including but not limited to gastrointestinal perforation, leak, obstruction, stricture and hemorrhage   I spent 45 min with the patient more than 50% of the time was spent educating the patient and coordinating care

## 2020-02-20 ENCOUNTER — OFFICE VISIT (OUTPATIENT)
Dept: BARIATRICS | Facility: CLINIC | Age: 50
End: 2020-02-20

## 2020-02-20 VITALS — BODY MASS INDEX: 41.34 KG/M2 | WEIGHT: 263.4 LBS | HEIGHT: 67 IN

## 2020-02-20 DIAGNOSIS — E66.01 OBESITY, CLASS III, BMI 40-49.9 (MORBID OBESITY) (HCC): Primary | ICD-10-CM

## 2020-02-20 PROCEDURE — RECHECK

## 2020-02-20 NOTE — PROGRESS NOTES
Ht 5' 7 2" (1 707 m)   Wt 119 kg (263 lb 6 4 oz)   BMI 41 01 kg/m²    Patient here for 2 of 6 weight check  Patient stated school in January full time  Going to the gym 3x per week  Patient reports drinking about 80 oz per day and has decreased soda to 1-2 per day  Discussed the importance of the 30/60 rule   Goals discussed: 1  continue to increase activity 2  decrease soda 3  practice 30/60 rule 3  support group 4  logging food consistently HC LCSW

## 2020-02-29 ENCOUNTER — APPOINTMENT (OUTPATIENT)
Dept: RADIOLOGY | Facility: MEDICAL CENTER | Age: 50
End: 2020-02-29
Payer: COMMERCIAL

## 2020-02-29 ENCOUNTER — TRANSCRIBE ORDERS (OUTPATIENT)
Dept: ADMINISTRATIVE | Facility: HOSPITAL | Age: 50
End: 2020-02-29

## 2020-02-29 DIAGNOSIS — M25.512 LEFT SHOULDER PAIN, UNSPECIFIED CHRONICITY: ICD-10-CM

## 2020-02-29 DIAGNOSIS — M54.2 CERVICALGIA: Primary | ICD-10-CM

## 2020-02-29 DIAGNOSIS — M54.2 CERVICALGIA: ICD-10-CM

## 2020-02-29 DIAGNOSIS — M25.511 RIGHT SHOULDER PAIN, UNSPECIFIED CHRONICITY: ICD-10-CM

## 2020-02-29 PROCEDURE — 72050 X-RAY EXAM NECK SPINE 4/5VWS: CPT

## 2020-03-13 ENCOUNTER — APPOINTMENT (OUTPATIENT)
Dept: RADIOLOGY | Facility: MEDICAL CENTER | Age: 50
End: 2020-03-13
Payer: COMMERCIAL

## 2020-03-13 PROCEDURE — 73030 X-RAY EXAM OF SHOULDER: CPT

## 2020-03-24 NOTE — PROGRESS NOTES
Bariatric Nutrition Assessment Note    Televisit  i  Name was verified by patient stating name? Yes  ii   verified by patient stating? yes  iii  You verified the patient is alone? Yes   iv  I would like to verify that you were offered a live visit but are now consenting to this telephone visit? yes  v  This visit is free yes       Type of surgery    Preop  Surgery Date: patient has 6 month program requirement     Surgeon: Dr Enmanuel Aguilar    Today is 3/6 of pre op program     Nutrition Assessment   Aline Tran Baxter  52 y o   female     Wt with BMI of 25: 161 2#  Pre-Op Excess Wt: 102 3#  There were no vitals taken for this visit   - due to Covid - 19     Ht 5' 7 25" (1 708 m) Comment: documented in EPIC  Wt 120 kg (263 lb 8 oz) Comment: self report  BMI 40 96 kg/m²       Wt Readings from Last 3 Encounters:   20 119 kg (263 lb 6 4 oz)   01/10/20 122 kg (270 lb)   12/10/19 122 kg (268 lb 12 8 oz)       Gilbert Simon Equation:  BMR :  1867 kcal per day  Estimated calories for weight loss = 0173-4336 kcal per day (1-2# per wk wt loss - sedentary )  Estimated protein needs = 73-90 grams per day ( 1 0-1 2 grams /k g OBW)  Estimated fluid needs = 2564 kcal per day / 85 ounces per day ( 35 mg/kg IBW )       Review of History and Medications   Past Medical History:   Diagnosis Date    GERD (gastroesophageal reflux disease)     History of transfusion     Kidney stone     PCOS (polycystic ovarian syndrome)      Past Surgical History:   Procedure Laterality Date    COLONOSCOPY  2019    DILATION AND CURETTAGE OF UTERUS      D&E    EGD      UPPER GASTROINTESTINAL ENDOSCOPY  2019     Social History     Socioeconomic History    Marital status: /Civil Union     Spouse name: Not on file    Number of children: Not on file    Years of education: Not on file    Highest education level: Not on file   Occupational History    Not on file   Social Needs    Financial resource strain: Not on file   Ferny Food insecurity:     Worry: Not on file     Inability: Not on file    Transportation needs:     Medical: Not on file     Non-medical: Not on file   Tobacco Use    Smoking status: Former Smoker    Smokeless tobacco: Never Used   Substance and Sexual Activity    Alcohol use: No    Drug use: No    Sexual activity: Yes     Partners: Male     Birth control/protection: Female Sterilization     Comment: Rashida 2010   Lifestyle    Physical activity:     Days per week: Not on file     Minutes per session: Not on file    Stress: Not on file   Relationships    Social connections:     Talks on phone: Not on file     Gets together: Not on file     Attends Muslim service: Not on file     Active member of club or organization: Not on file     Attends meetings of clubs or organizations: Not on file     Relationship status: Not on file    Intimate partner violence:     Fear of current or ex partner: Not on file     Emotionally abused: Not on file     Physically abused: Not on file     Forced sexual activity: Not on file   Other Topics Concern    Not on file   Social History Narrative    Not on file       Current Outpatient Medications:     cetirizine (ZyrTEC) 10 mg tablet, Take 10 mg by mouth daily, Disp: , Rfl:     cholecalciferol (VITAMIN D3) 1,000 units tablet, Take 1,000 Units by mouth daily, Disp: , Rfl:     dicyclomine (BENTYL) 10 mg capsule, Take 1 capsule (10 mg total) by mouth 2 (two) times a day as needed (For abdominal pain) (Patient not taking: Reported on 12/10/2019), Disp: 60 capsule, Rfl: 4    esomeprazole (NexIUM) 40 MG capsule, Take 1 capsule (40 mg total) by mouth 2 (two) times a day before meals (Patient not taking: Reported on 12/10/2019), Disp: 180 capsule, Rfl: 2    ferrous sulfate 325 (65 Fe) mg tablet, ferrous sulfate 325 mg (65 mg iron) tablet, Disp: , Rfl:     ondansetron (ZOFRAN-ODT) 4 mg disintegrating tablet, Take 1 tablet (4 mg total) by mouth every 6 (six) hours as needed for nausea or vomiting (Patient not taking: Reported on 12/10/2019), Disp: 20 tablet, Rfl: 0    pantoprazole (PROTONIX) 40 mg tablet, Take 40 mg by mouth 2 (two) times a day, Disp: , Rfl: 2    Probiotic Product (VISBIOME PROBIOTIC HIGH POT) CAPS, Take 1 capsule by mouth daily (Patient not taking: Reported on 12/10/2019), Disp: 30 capsule, Rfl: 6  Food Intake and Lifestyle Assessment   Food Intake Assessment completed via usual food recall   Breakfast: 9 am :  Eggs , trujillo, toast , unsweetened tea   Snack: 0   Lunch: ham and cheese sandwich   Snack: chips   Dinner: chicken or steak and vegetables with potatoes  With soda   Snack: Has eliminated sweets in the evening - has replaced nuts   Beverage intake: water and regular soda  Protein supplement: regular soda, water, unsweetened tea   Estimated protein intake per day: 60-70 grams   Estimated fluid intake per day: 50 ounce per day   Meals eaten away from home: 2-3 times per day   Typical meal pattern: 3 meals per day and 1-2 snacks per day  Eating Behaviors: Mindless eating, Emotional eating, Craves sweet foods and Craves salty foods  Food allergies or intolerances:    Allergies   Allergen Reactions    Penicillins Hives     Cultural or Oriental orthodox considerations: N/A     Physical Assessment  Physical Activity  Types of exercise: gym - 2-3 times per week for 30 minutes- on hold due to Covid-19    Now the gym is closed due to Covid -19   Does exercises at home ( 15 to 30 minutes )   Current physical limitations: fatigue     Psychosocial Assessment   Support systems: spouse children  Socioeconomic factors: lives with  and 12year old   Currently not working     Nutrition Diagnosis( continued )   Diagnosis: Overweight / Obesity (NC-3 3)  Related to: Physical inactivity and Excessive energy intake  As Evidenced by: BMI >25 and Excessive energy intake     Nutrition Prescription: Recommend the following diet  Regular    Interventions and Teaching   Discussed pre-op and post-op nutrition guidelines  Patient educated and handouts provided  Surgical changes to stomach / GI  Capacity of post-surgery stomach  Diet progression  Adequate hydration  Sugar and fat restriction to decrease "dumping syndrome"  Fat restriction to decrease steatorrhea  Expected weight loss  Weight loss plateaus/ possibility of weight regain  Exercise  Suggestions for pre-op diet  Nutrition considerations after surgery  Protein supplements  Meal planning and preparation  Appropriate carbohydrate, protein, and fat intake, and food/fluid choices to maximize safe weight loss, nutrient intake, and tolerance   Dietary and lifestyle changes  Possible problems with poor eating habits  Intuitive eating  Techniques for self monitoring and keeping daily food journal  Potential for food intolerance after surgery, and ways to deal with them including: lactose intolerance, nausea, reflux, vomiting, diarrhea, food intolerance, appetite changes, gas  Vitamin / Mineral supplementation of Multivitamin with minerals, Calcium, Vitamin B12, Iron, Fat Soluble vitamins, Vitamin D and instructed patient to continue loading dose of vitamin D and to start taking an adult multivitamin and mineral supplement daily       Education provided to: patient    Barriers to learning: No barriers identified    Readiness to change: preparation      Comprehension: verbalizes understanding     Expected Compliance: good  Workflow:   PCP Letter: NO- PCP wants note from program    Support Group:- provided with online option - sent email    6 Month Pre-Operative Program: 3/6   Bloodwork: No    Cardiac Risk Assessment: on hold due to Covid -19    Sleep Studies:N/A   EGD done with Dr Vineet Foster Nov/2019   Weight Loss: ongoing    Psych/D+A/Nicotine? N/A    Pt is an appropriate candidate for surgery   Yes    Evaluation / Monitoring  Dietitian to Monitor: Eating pattern as discussed Body weight Physical activity    Pt brought manual to this visit - Not applicable  Pt is completing pre-operative lesson plans - No- busy with school   Pt is practicing 30/60-minute rule - No  Pt is food journaling/logging - No , was using MF but feel behind with doing school work   Pt is measuring foods using measuring cups and spoons or eating off 8-9 inch plate : No- but only eats vegetables and meats at meals   Pt is taking a multivitamin daily  and Vitamin D3  - Yes takes vitamin D weekly   Pt is getting at least 64 oz of sugar-free, caffeine-free, noncarbonated fluids daily - Yes   Types of fluids include:   Has decreased her soda intake one can per day and increased her fluid intake   Pt is exercising - Yes  Types of  exercise - home exercises       Goals- continued   Eliminate sugar sweetened beverages, Food journal, Exercise 30 minutes 5 times per week, Complete lession plans 1-6, Eat 3 meals per day and Eliminate mindless snacking   Work on not drinking before and during meals   Time meals to 15 minutes and chew food thoroughly    Work on lesson plans     Time Spent:   30 minutes

## 2020-03-25 ENCOUNTER — OFFICE VISIT (OUTPATIENT)
Dept: BARIATRICS | Facility: CLINIC | Age: 50
End: 2020-03-25

## 2020-03-25 DIAGNOSIS — E66.01 OBESITY, CLASS III, BMI 40-49.9 (MORBID OBESITY) (HCC): Primary | ICD-10-CM

## 2020-03-25 PROCEDURE — RECHECK: Performed by: DIETITIAN, REGISTERED

## 2020-04-16 ENCOUNTER — TELEPHONE (OUTPATIENT)
Dept: BARIATRICS | Facility: CLINIC | Age: 50
End: 2020-04-16

## 2020-04-20 VITALS — BODY MASS INDEX: 41.36 KG/M2 | HEIGHT: 67 IN | WEIGHT: 263.5 LBS

## 2020-04-22 ENCOUNTER — OFFICE VISIT (OUTPATIENT)
Dept: BARIATRICS | Facility: CLINIC | Age: 50
End: 2020-04-22

## 2020-04-22 VITALS — HEIGHT: 67 IN | WEIGHT: 265 LBS | BODY MASS INDEX: 41.59 KG/M2

## 2020-04-22 DIAGNOSIS — E66.01 OBESITY, CLASS III, BMI 40-49.9 (MORBID OBESITY) (HCC): Primary | ICD-10-CM

## 2020-04-22 PROCEDURE — RECHECK: Performed by: DIETITIAN, REGISTERED

## 2020-04-28 ENCOUNTER — APPOINTMENT (OUTPATIENT)
Dept: LAB | Facility: MEDICAL CENTER | Age: 50
End: 2020-04-28
Payer: COMMERCIAL

## 2020-04-28 ENCOUNTER — TRANSCRIBE ORDERS (OUTPATIENT)
Dept: ADMINISTRATIVE | Facility: HOSPITAL | Age: 50
End: 2020-04-28

## 2020-04-28 DIAGNOSIS — K90.0 CELIAC DISEASE: ICD-10-CM

## 2020-04-28 DIAGNOSIS — Z00.00 ROUTINE GENERAL MEDICAL EXAMINATION AT A HEALTH CARE FACILITY: ICD-10-CM

## 2020-04-28 DIAGNOSIS — E55.9 VITAMIN D DEFICIENCY: Primary | ICD-10-CM

## 2020-04-28 DIAGNOSIS — E55.9 VITAMIN D DEFICIENCY: ICD-10-CM

## 2020-04-28 LAB
25(OH)D3 SERPL-MCNC: 37.6 NG/ML (ref 30–100)
ALBUMIN SERPL BCP-MCNC: 3.4 G/DL (ref 3.5–5)
ALP SERPL-CCNC: 66 U/L (ref 46–116)
ALT SERPL W P-5'-P-CCNC: 23 U/L (ref 12–78)
ANION GAP SERPL CALCULATED.3IONS-SCNC: 4 MMOL/L (ref 4–13)
AST SERPL W P-5'-P-CCNC: 7 U/L (ref 5–45)
BASOPHILS # BLD AUTO: 0.09 THOUSANDS/ΜL (ref 0–0.1)
BASOPHILS NFR BLD AUTO: 1 % (ref 0–1)
BILIRUB SERPL-MCNC: 0.25 MG/DL (ref 0.2–1)
BUN SERPL-MCNC: 10 MG/DL (ref 5–25)
CALCIUM SERPL-MCNC: 9.4 MG/DL (ref 8.3–10.1)
CHLORIDE SERPL-SCNC: 107 MMOL/L (ref 100–108)
CHOLEST SERPL-MCNC: 115 MG/DL (ref 50–200)
CO2 SERPL-SCNC: 29 MMOL/L (ref 21–32)
CREAT SERPL-MCNC: 0.77 MG/DL (ref 0.6–1.3)
EOSINOPHIL # BLD AUTO: 0.27 THOUSAND/ΜL (ref 0–0.61)
EOSINOPHIL NFR BLD AUTO: 3 % (ref 0–6)
ERYTHROCYTE [DISTWIDTH] IN BLOOD BY AUTOMATED COUNT: 12.6 % (ref 11.6–15.1)
GFR SERPL CREATININE-BSD FRML MDRD: 106 ML/MIN/1.73SQ M
GLUCOSE P FAST SERPL-MCNC: 95 MG/DL (ref 65–99)
HCT VFR BLD AUTO: 37.7 % (ref 34.8–46.1)
HDLC SERPL-MCNC: 32 MG/DL
HGB BLD-MCNC: 11.6 G/DL (ref 11.5–15.4)
IMM GRANULOCYTES # BLD AUTO: 0.04 THOUSAND/UL (ref 0–0.2)
IMM GRANULOCYTES NFR BLD AUTO: 1 % (ref 0–2)
LDLC SERPL CALC-MCNC: 68 MG/DL (ref 0–100)
LYMPHOCYTES # BLD AUTO: 3.1 THOUSANDS/ΜL (ref 0.6–4.47)
LYMPHOCYTES NFR BLD AUTO: 36 % (ref 14–44)
MCH RBC QN AUTO: 28.4 PG (ref 26.8–34.3)
MCHC RBC AUTO-ENTMCNC: 30.8 G/DL (ref 31.4–37.4)
MCV RBC AUTO: 92 FL (ref 82–98)
MONOCYTES # BLD AUTO: 0.96 THOUSAND/ΜL (ref 0.17–1.22)
MONOCYTES NFR BLD AUTO: 11 % (ref 4–12)
NEUTROPHILS # BLD AUTO: 4.17 THOUSANDS/ΜL (ref 1.85–7.62)
NEUTS SEG NFR BLD AUTO: 48 % (ref 43–75)
NRBC BLD AUTO-RTO: 0 /100 WBCS
PLATELET # BLD AUTO: 377 THOUSANDS/UL (ref 149–390)
PMV BLD AUTO: 9.4 FL (ref 8.9–12.7)
POTASSIUM SERPL-SCNC: 4 MMOL/L (ref 3.5–5.3)
PROT SERPL-MCNC: 8.4 G/DL (ref 6.4–8.2)
RBC # BLD AUTO: 4.08 MILLION/UL (ref 3.81–5.12)
SODIUM SERPL-SCNC: 140 MMOL/L (ref 136–145)
T4 FREE SERPL-MCNC: 0.95 NG/DL (ref 0.76–1.46)
TRIGL SERPL-MCNC: 77 MG/DL
TSH SERPL DL<=0.05 MIU/L-ACNC: 0.22 UIU/ML (ref 0.36–3.74)
WBC # BLD AUTO: 8.63 THOUSAND/UL (ref 4.31–10.16)

## 2020-04-28 PROCEDURE — 86255 FLUORESCENT ANTIBODY SCREEN: CPT

## 2020-04-28 PROCEDURE — 84443 ASSAY THYROID STIM HORMONE: CPT

## 2020-04-28 PROCEDURE — 82784 ASSAY IGA/IGD/IGG/IGM EACH: CPT

## 2020-04-28 PROCEDURE — 83516 IMMUNOASSAY NONANTIBODY: CPT

## 2020-04-28 PROCEDURE — 36415 COLL VENOUS BLD VENIPUNCTURE: CPT

## 2020-04-28 PROCEDURE — 80053 COMPREHEN METABOLIC PANEL: CPT

## 2020-04-28 PROCEDURE — 85025 COMPLETE CBC W/AUTO DIFF WBC: CPT

## 2020-04-28 PROCEDURE — 80061 LIPID PANEL: CPT

## 2020-04-28 PROCEDURE — 82306 VITAMIN D 25 HYDROXY: CPT

## 2020-04-28 PROCEDURE — 84439 ASSAY OF FREE THYROXINE: CPT

## 2020-04-29 LAB
ENDOMYSIUM IGA SER QL: NEGATIVE
GLIADIN PEPTIDE IGA SER-ACNC: 6 UNITS (ref 0–19)
GLIADIN PEPTIDE IGG SER-ACNC: 2 UNITS (ref 0–19)
IGA SERPL-MCNC: 506 MG/DL (ref 87–352)
TTG IGA SER-ACNC: <2 U/ML (ref 0–3)
TTG IGG SER-ACNC: 3 U/ML (ref 0–5)

## 2020-05-22 ENCOUNTER — OFFICE VISIT (OUTPATIENT)
Dept: BARIATRICS | Facility: CLINIC | Age: 50
End: 2020-05-22

## 2020-05-22 VITALS — WEIGHT: 264 LBS | BODY MASS INDEX: 41.44 KG/M2 | HEIGHT: 67 IN

## 2020-05-22 DIAGNOSIS — E66.01 OBESITY, CLASS III, BMI 40-49.9 (MORBID OBESITY) (HCC): Primary | ICD-10-CM

## 2020-05-22 DIAGNOSIS — Z87.42 HISTORY OF POLYCYSTIC OVARIAN SYNDROME: ICD-10-CM

## 2020-05-22 PROCEDURE — RECHECK: Performed by: DIETITIAN, REGISTERED

## 2020-06-01 ENCOUNTER — TELEPHONE (OUTPATIENT)
Dept: CARDIOLOGY CLINIC | Facility: CLINIC | Age: 50
End: 2020-06-01

## 2020-06-02 ENCOUNTER — TELEPHONE (OUTPATIENT)
Dept: CARDIOLOGY CLINIC | Facility: CLINIC | Age: 50
End: 2020-06-02

## 2020-06-03 ENCOUNTER — CONSULT (OUTPATIENT)
Dept: CARDIOLOGY CLINIC | Facility: CLINIC | Age: 50
End: 2020-06-03
Payer: COMMERCIAL

## 2020-06-03 VITALS
TEMPERATURE: 97.3 F | WEIGHT: 265.4 LBS | DIASTOLIC BLOOD PRESSURE: 80 MMHG | HEIGHT: 67 IN | HEART RATE: 77 BPM | SYSTOLIC BLOOD PRESSURE: 118 MMHG | BODY MASS INDEX: 41.65 KG/M2

## 2020-06-03 DIAGNOSIS — E66.01 MORBID OBESITY (HCC): Primary | ICD-10-CM

## 2020-06-03 DIAGNOSIS — Z01.810 PREOPERATIVE CARDIOVASCULAR EXAMINATION: ICD-10-CM

## 2020-06-03 PROCEDURE — 99243 OFF/OP CNSLTJ NEW/EST LOW 30: CPT | Performed by: INTERNAL MEDICINE

## 2020-06-03 PROCEDURE — 93000 ELECTROCARDIOGRAM COMPLETE: CPT | Performed by: INTERNAL MEDICINE

## 2020-06-19 ENCOUNTER — OFFICE VISIT (OUTPATIENT)
Dept: BARIATRICS | Facility: CLINIC | Age: 50
End: 2020-06-19

## 2020-06-19 VITALS — WEIGHT: 268.2 LBS | HEIGHT: 67 IN | BODY MASS INDEX: 42.09 KG/M2 | TEMPERATURE: 97.5 F

## 2020-06-19 DIAGNOSIS — E66.01 OBESITY, CLASS III, BMI 40-49.9 (MORBID OBESITY) (HCC): Primary | ICD-10-CM

## 2020-06-19 PROCEDURE — RECHECK: Performed by: DIETITIAN, REGISTERED

## 2020-06-24 ENCOUNTER — TELEPHONE (OUTPATIENT)
Dept: BARIATRICS | Facility: CLINIC | Age: 50
End: 2020-06-24

## 2020-07-02 ENCOUNTER — OFFICE VISIT (OUTPATIENT)
Dept: BARIATRICS | Facility: CLINIC | Age: 50
End: 2020-07-02
Payer: COMMERCIAL

## 2020-07-02 ENCOUNTER — CLINICAL SUPPORT (OUTPATIENT)
Dept: BARIATRICS | Facility: CLINIC | Age: 50
End: 2020-07-02

## 2020-07-02 VITALS
HEIGHT: 67 IN | DIASTOLIC BLOOD PRESSURE: 70 MMHG | SYSTOLIC BLOOD PRESSURE: 124 MMHG | HEART RATE: 96 BPM | WEIGHT: 267 LBS | TEMPERATURE: 67.2 F | BODY MASS INDEX: 41.91 KG/M2

## 2020-07-02 DIAGNOSIS — K44.9 HIATAL HERNIA: ICD-10-CM

## 2020-07-02 DIAGNOSIS — K21.00 GASTROESOPHAGEAL REFLUX DISEASE WITH ESOPHAGITIS: Primary | ICD-10-CM

## 2020-07-02 DIAGNOSIS — E66.01 MORBID (SEVERE) OBESITY DUE TO EXCESS CALORIES (HCC): Primary | ICD-10-CM

## 2020-07-02 DIAGNOSIS — E66.01 OBESITY, CLASS III, BMI 40-49.9 (MORBID OBESITY) (HCC): ICD-10-CM

## 2020-07-02 PROCEDURE — RECHECK: Performed by: DIETITIAN, REGISTERED

## 2020-07-02 PROCEDURE — 99214 OFFICE O/P EST MOD 30 MIN: CPT | Performed by: SURGERY

## 2020-07-02 RX ORDER — LEVOFLOXACIN 5 MG/ML
750 INJECTION, SOLUTION INTRAVENOUS ONCE
Status: CANCELLED | OUTPATIENT
Start: 2020-07-14 | End: 2020-07-02

## 2020-07-02 RX ORDER — ACETAMINOPHEN 325 MG/1
975 TABLET ORAL ONCE
Status: CANCELLED | OUTPATIENT
Start: 2020-07-14 | End: 2020-07-02

## 2020-07-02 RX ORDER — GABAPENTIN 300 MG/1
300 CAPSULE ORAL ONCE
Status: CANCELLED | OUTPATIENT
Start: 2020-07-14 | End: 2020-07-02

## 2020-07-02 RX ORDER — CELECOXIB 200 MG/1
200 CAPSULE ORAL ONCE
Status: CANCELLED | OUTPATIENT
Start: 2020-07-14 | End: 2020-07-02

## 2020-07-02 RX ORDER — HEPARIN SODIUM 5000 [USP'U]/ML
5000 INJECTION, SOLUTION INTRAVENOUS; SUBCUTANEOUS
Status: CANCELLED | OUTPATIENT
Start: 2020-07-15 | End: 2020-07-16

## 2020-07-02 RX ORDER — SCOLOPAMINE TRANSDERMAL SYSTEM 1 MG/1
1 PATCH, EXTENDED RELEASE TRANSDERMAL ONCE
Status: CANCELLED | OUTPATIENT
Start: 2020-07-14 | End: 2020-07-02

## 2020-07-02 NOTE — PROGRESS NOTES
Weight Management Nutrition TEAMS Virtual Class     Diagnosis: Morbid Obesity    Bariatric Surgeon: Dr Mani Gao    Surgery: Vertical Sleeve Gastrectomy    Class: Pre-Operative Class    Topics discussed today include:     fluid goals post op, protein goals post op, constipation, chew food well, exercise, avoidance of alcohol, PPI use, diet progression, hypoglycemia, dumping syndrome, protein supplems, vitamin/mineral supplements, calcium supplements, additional vitamin B12, iron supplements and fat soluble vitamins   Pt  educated on the pre operative liver shrinking diet  Pt understands that the diet needs to be followed for 2 weeks prior to surgery  Handout reviewed  Emphasized the need to drink 80 ounces of fluid per day while on the diet  Patient will return for a pre op weight check to ensure she has met her goal  Contact information for any questions/concerns    Patient was able to verbalize basic diet (protein, fluid, vitamin and mineral) recommendations and possible nutrition-related complications   Yes

## 2020-07-02 NOTE — PROGRESS NOTES
Final H&P - BARIATRIC SURGERY  Amari De Dios 52 y o  female MRN: [id number]  Unit/Bed#:  Encounter: [id number]      HPI:  Smita Christopher is a 52 y o  female  here to discuss lap sleeve gastrectomy before her OR date  Subjective     Patient reports doing well  The patient does experience heartburn and dysphagia, for which she takes protonix 40 mg intermittently  Her symptoms are only caused w/spicy or acidic meals  Past surgical history is unremarkable  Review of Systems   Constitutional: Negative  HENT: Negative  Eyes: Negative  Respiratory: Negative  Cardiovascular: Negative  Gastrointestinal: Negative  Endocrine: Negative  Genitourinary: Negative  Musculoskeletal: Negative  Skin: Negative  Allergic/Immunologic: Negative  Neurological: Negative  Hematological: Negative  Psychiatric/Behavioral: Negative  Historical Information   Past Medical History:   Diagnosis Date    GERD (gastroesophageal reflux disease)     History of transfusion     Kidney stone     PCOS (polycystic ovarian syndrome)      Past Surgical History:   Procedure Laterality Date    COLONOSCOPY  11/2019    DILATION AND CURETTAGE OF UTERUS      D&E    EGD      UPPER GASTROINTESTINAL ENDOSCOPY  11/2019     Social History   Social History     Substance and Sexual Activity   Alcohol Use No     Social History     Substance and Sexual Activity   Drug Use No     Social History     Tobacco Use   Smoking Status Former Smoker   Smokeless Tobacco Never Used       Objective       Current Vitals:   Blood Pressure: 124/70 (07/02/20 1545)  Pulse: 96 (07/02/20 1545)  Temperature: (!) 67 2 °F (19 6 °C) (07/02/20 1545)  Temp Source: Temporal (07/02/20 1545)  Height: 5' 7 2" (170 7 cm) (07/02/20 1545)  Weight - Scale: 121 kg (267 lb) (07/02/20 1545)    Invasive Devices     None                 Physical Exam   Constitutional: She is oriented to person, place, and time   She appears well-developed and well-nourished  HENT:   Head: Normocephalic and atraumatic  Nose: Nose normal    Eyes: Conjunctivae and EOM are normal    Neck: Normal range of motion  Cardiovascular: Normal rate, regular rhythm, normal heart sounds and intact distal pulses  Pulmonary/Chest: Effort normal and breath sounds normal    Abdominal: Soft  Bowel sounds are normal  She exhibits no distension and no mass  There is no tenderness  There is no rebound and no guarding  No hernia  No surgical incisions  Musculoskeletal: Normal range of motion  Neurological: She is alert and oriented to person, place, and time  Skin: Skin is warm  Psychiatric: She has a normal mood and affect  Her behavior is normal  Judgment and thought content normal          Pathology, and Other Studies: I have personally reviewed pertinent films in PACS      Assessment/PLAN:    Margarette Aragon is a 52 y o  female, here to discuss the final details before her lap RYGB  Of note, the patient initially wanted a sleeve gastrectomy; but after discussing her symptoms as well as her preop EGD she would now like to change to a RYGB     ---------------------------------------------------    The patient's workup thus far was reviewed, and includes:    EGD (2019)    - Non-obstructing Schatzki ring  - Esophagitis  - 5cm hiatal hernia    Pathology    Final Diagnosis   A  Small bowel, duodenum, biopsy:             - Reactive duodenal mucosa  - No evidence of malabsorptive enteropathy              - No dysplasia or malignancy is identified      B  Small bowel, duodenal nodule, biopsy:             - Nodular peptic duodenitis             - No evidence of malabsorptive enteropathy              - No dysplasia or malignancy is identified      C  Small bowel, duodenum, biopsy of nodule:             - Nodular peptic duodenitis  - No evidence of malabsorptive enteropathy              - No dysplasia or malignancy is identified      D   Stomach, biopsy: - Antral mucosa with chronic inactive gastritis  - No Helicobacter pylori organisms are identified on the routine stain              - No intestinal metaplasia, dysplasia or malignancy is identified      E  Stomach, biopsy:             - Oxyntic mucosa with mild chronic inactive gastritis  - No Helicobacter pylori organisms are identified on the routine stain              - No intestinal metaplasia, dysplasia or malignancy is identified      --------------------------------------------------------------------    Medical clearance has been granted  Results of her EGD were reviewed, along with the patient's symptoms of reflux and dysphagia are c/w clinically significant reflux disease  These were all discussed with the patient, and the possibility of a hiatal hernia repair during her bariatric operation was discussed  The patient has decided that she would like to proceed with a RYGB instead of sleeve gastrectomy  The patient was consented and scheduled                Norm Pereira MD  Bariatric Surgery Fellow  7/2/2020  3:58 PM

## 2020-07-02 NOTE — H&P (VIEW-ONLY)
Final H&P - BARIATRIC SURGERY  Karina De Dios 52 y o  female MRN: [id number]  Unit/Bed#:  Encounter: [id number]      HPI:  Alicia Jacinto is a 52 y o  female  here to discuss lap sleeve gastrectomy before her OR date  Subjective     Patient reports doing well  The patient does experience heartburn and dysphagia, for which she takes protonix 40 mg intermittently  Her symptoms are only caused w/spicy or acidic meals  Past surgical history is unremarkable  Review of Systems   Constitutional: Negative  HENT: Negative  Eyes: Negative  Respiratory: Negative  Cardiovascular: Negative  Gastrointestinal: Negative  Endocrine: Negative  Genitourinary: Negative  Musculoskeletal: Negative  Skin: Negative  Allergic/Immunologic: Negative  Neurological: Negative  Hematological: Negative  Psychiatric/Behavioral: Negative  Historical Information   Past Medical History:   Diagnosis Date    GERD (gastroesophageal reflux disease)     History of transfusion     Kidney stone     PCOS (polycystic ovarian syndrome)      Past Surgical History:   Procedure Laterality Date    COLONOSCOPY  11/2019    DILATION AND CURETTAGE OF UTERUS      D&E    EGD      UPPER GASTROINTESTINAL ENDOSCOPY  11/2019     Social History   Social History     Substance and Sexual Activity   Alcohol Use No     Social History     Substance and Sexual Activity   Drug Use No     Social History     Tobacco Use   Smoking Status Former Smoker   Smokeless Tobacco Never Used       Objective       Current Vitals:   Blood Pressure: 124/70 (07/02/20 1545)  Pulse: 96 (07/02/20 1545)  Temperature: (!) 67 2 °F (19 6 °C) (07/02/20 1545)  Temp Source: Temporal (07/02/20 1545)  Height: 5' 7 2" (170 7 cm) (07/02/20 1545)  Weight - Scale: 121 kg (267 lb) (07/02/20 1545)    Invasive Devices     None                 Physical Exam   Constitutional: She is oriented to person, place, and time   She appears well-developed and well-nourished  HENT:   Head: Normocephalic and atraumatic  Nose: Nose normal    Eyes: Conjunctivae and EOM are normal    Neck: Normal range of motion  Cardiovascular: Normal rate, regular rhythm, normal heart sounds and intact distal pulses  Pulmonary/Chest: Effort normal and breath sounds normal    Abdominal: Soft  Bowel sounds are normal  She exhibits no distension and no mass  There is no tenderness  There is no rebound and no guarding  No hernia  No surgical incisions  Musculoskeletal: Normal range of motion  Neurological: She is alert and oriented to person, place, and time  Skin: Skin is warm  Psychiatric: She has a normal mood and affect  Her behavior is normal  Judgment and thought content normal          Pathology, and Other Studies: I have personally reviewed pertinent films in PACS      Assessment/PLAN:    Eugenio King is a 52 y o  female, here to discuss the final details before her lap RYGB  Of note, the patient initially wanted a sleeve gastrectomy; but after discussing her symptoms as well as her preop EGD she would now like to change to a RYGB     ---------------------------------------------------    The patient's workup thus far was reviewed, and includes:    EGD (2019)    - Non-obstructing Schatzki ring  - Esophagitis  - 5cm hiatal hernia    Pathology    Final Diagnosis   A  Small bowel, duodenum, biopsy:             - Reactive duodenal mucosa  - No evidence of malabsorptive enteropathy              - No dysplasia or malignancy is identified      B  Small bowel, duodenal nodule, biopsy:             - Nodular peptic duodenitis             - No evidence of malabsorptive enteropathy              - No dysplasia or malignancy is identified      C  Small bowel, duodenum, biopsy of nodule:             - Nodular peptic duodenitis  - No evidence of malabsorptive enteropathy              - No dysplasia or malignancy is identified      D   Stomach, biopsy: - Antral mucosa with chronic inactive gastritis  - No Helicobacter pylori organisms are identified on the routine stain              - No intestinal metaplasia, dysplasia or malignancy is identified      E  Stomach, biopsy:             - Oxyntic mucosa with mild chronic inactive gastritis  - No Helicobacter pylori organisms are identified on the routine stain              - No intestinal metaplasia, dysplasia or malignancy is identified      --------------------------------------------------------------------    Medical clearance has been granted  Results of her EGD were reviewed, along with the patient's symptoms of reflux and dysphagia are c/w clinically significant reflux disease  These were all discussed with the patient, and the possibility of a hiatal hernia repair during her bariatric operation was discussed  The patient has decided that she would like to proceed with a RYGB instead of sleeve gastrectomy  The patient was consented and scheduled                Bao Luevano MD  Bariatric Surgery Fellow  7/2/2020  3:58 PM

## 2020-07-06 DIAGNOSIS — E66.9 OBESITY, UNSPECIFIED: Primary | ICD-10-CM

## 2020-07-06 DIAGNOSIS — E66.01 OBESITY, CLASS III, BMI 40-49.9 (MORBID OBESITY) (HCC): Primary | ICD-10-CM

## 2020-07-06 RX ORDER — WHEAT DEXTRIN 3 G/3.8 G
POWDER (GRAM) ORAL
Qty: 1 CAN | Refills: 1 | Status: SHIPPED | OUTPATIENT
Start: 2020-07-06 | End: 2021-01-06

## 2020-07-06 RX ORDER — POLYETHYLENE GLYCOL 3350 17 G/17G
17 POWDER, FOR SOLUTION ORAL DAILY
Qty: 507 G | Refills: 1 | Status: SHIPPED | OUTPATIENT
Start: 2020-07-06 | End: 2020-10-29

## 2020-07-07 ENCOUNTER — TELEPHONE (OUTPATIENT)
Dept: BARIATRICS | Facility: CLINIC | Age: 50
End: 2020-07-07

## 2020-07-07 DIAGNOSIS — E66.9 OBESITY, UNSPECIFIED: ICD-10-CM

## 2020-07-07 DIAGNOSIS — E66.01 MORBID OBESITY (HCC): ICD-10-CM

## 2020-07-07 PROCEDURE — U0003 INFECTIOUS AGENT DETECTION BY NUCLEIC ACID (DNA OR RNA); SEVERE ACUTE RESPIRATORY SYNDROME CORONAVIRUS 2 (SARS-COV-2) (CORONAVIRUS DISEASE [COVID-19]), AMPLIFIED PROBE TECHNIQUE, MAKING USE OF HIGH THROUGHPUT TECHNOLOGIES AS DESCRIBED BY CMS-2020-01-R: HCPCS

## 2020-07-07 RX ORDER — OXYCODONE HYDROCHLORIDE 5 MG/1
5 TABLET ORAL EVERY 4 HOURS PRN
Qty: 10 TABLET | Refills: 0 | Status: SHIPPED | OUTPATIENT
Start: 2020-07-07 | End: 2021-01-06

## 2020-07-07 RX ORDER — OMEPRAZOLE 20 MG/1
20 CAPSULE, DELAYED RELEASE ORAL DAILY
Qty: 30 CAPSULE | Refills: 3 | Status: CANCELLED | OUTPATIENT
Start: 2020-07-07

## 2020-07-07 RX ORDER — OMEPRAZOLE 20 MG/1
20 CAPSULE, DELAYED RELEASE ORAL DAILY
Qty: 30 CAPSULE | Refills: 3 | Status: SHIPPED | OUTPATIENT
Start: 2020-07-07 | End: 2020-12-28 | Stop reason: SDUPTHER

## 2020-07-07 NOTE — TELEPHONE ENCOUNTER
Pre-op call was made to patient, message left, to follow up on how they are doing and to remind them to continue with all medical and dietary directions that were given at pre-op class regarding liver shrinking diet and hydration  They were encouraged to purchase all vitamins and protein shakes for post op use as well as to begin Miralax three days prior to surgery as directed in Section 6 of their manual   They were reminded of the Ensure Pre-surgery drinks protocol and to bring their completed yellow form with them to surgery as well as their CPAP-BiPAP machine if they use one  Lastly, they were informed that they would be weighed the morning of surgery, hospital guidelines regarding COVID and to give the office a call if they had any further questions or concerns

## 2020-07-08 ENCOUNTER — TELEPHONE (OUTPATIENT)
Dept: BARIATRICS | Facility: CLINIC | Age: 50
End: 2020-07-08

## 2020-07-08 NOTE — TELEPHONE ENCOUNTER
Returned patient's phone call  Answered her questions concerning the pre op liver shrinking diet    Patient is tolerating her diet well Appreciative of the call back

## 2020-07-09 RX ORDER — MULTIVITAMIN WITH IRON
250 TABLET ORAL DAILY
COMMUNITY

## 2020-07-09 RX ORDER — CHOLECALCIFEROL (VITAMIN D3) 1250 MCG
50000 CAPSULE ORAL WEEKLY
COMMUNITY

## 2020-07-09 RX ORDER — LANOLIN ALCOHOL/MO/W.PET/CERES
1000 CREAM (GRAM) TOPICAL DAILY
COMMUNITY
End: 2020-12-29 | Stop reason: SDUPTHER

## 2020-07-09 NOTE — PRE-PROCEDURE INSTRUCTIONS
Pre-Surgery Instructions:   Medication Instructions    cetirizine (ZyrTEC) 10 mg tablet Instructed patient per Anesthesia Guidelines   Cholecalciferol (VITAMIN D3) 1 25 MG (74479 UT) CAPS Instructed patient per Anesthesia Guidelines   ferrous sulfate 325 (65 Fe) mg tablet Instructed patient per Anesthesia Guidelines   Magnesium 250 MG TABS Instructed patient per Anesthesia Guidelines   Multiple Vitamins-Minerals (CENTRUM PO) Instructed patient per Anesthesia Guidelines   polyethylene glycol (GLYCOLAX) 17 GM/SCOOP powder Instructed patient per Anesthesia Guidelines   vitamin B-12 (VITAMIN B-12) 1,000 mcg tablet Instructed patient per Anesthesia Guidelines   Wheat Dextrin (BENEFIBER) POWD Instructed patient per Anesthesia Guidelines  Pt to take no meds the morning of surgery Patient given/ instructed on use of chlorhexidine soap per hospital protocol    Patient instructed to stop all ASA, NSAIDS, vitamins and herbal supplements one week prior to surgery or per Dr Lizzy Condon

## 2020-07-13 ENCOUNTER — ANESTHESIA EVENT (OUTPATIENT)
Dept: PERIOP | Facility: HOSPITAL | Age: 50
DRG: 403 | End: 2020-07-13
Payer: COMMERCIAL

## 2020-07-14 ENCOUNTER — HOSPITAL ENCOUNTER (INPATIENT)
Facility: HOSPITAL | Age: 50
LOS: 1 days | Discharge: HOME | DRG: 403 | End: 2020-07-15
Attending: SURGERY | Admitting: SURGERY
Payer: COMMERCIAL

## 2020-07-14 ENCOUNTER — ANESTHESIA (OUTPATIENT)
Dept: PERIOP | Facility: HOSPITAL | Age: 50
DRG: 403 | End: 2020-07-14
Payer: COMMERCIAL

## 2020-07-14 LAB
EXT PREGNANCY TEST URINE: NEGATIVE
EXT. CONTROL: NORMAL
SARS-COV-2 RNA SPEC QL NAA+PROBE: NOT DETECTED

## 2020-07-14 PROCEDURE — 81025 URINE PREGNANCY TEST: CPT | Performed by: ANESTHESIOLOGY

## 2020-07-14 PROCEDURE — 43281 LAP PARAESOPHAG HERN REPAIR: CPT | Performed by: SURGERY

## 2020-07-14 PROCEDURE — 0BQT4ZZ REPAIR DIAPHRAGM, PERCUTANEOUS ENDOSCOPIC APPROACH: ICD-10-PCS | Performed by: SURGERY

## 2020-07-14 PROCEDURE — C9290 INJ, BUPIVACAINE LIPOSOME: HCPCS | Performed by: SURGERY

## 2020-07-14 PROCEDURE — 0DJ08ZZ INSPECTION OF UPPER INTESTINAL TRACT, VIA NATURAL OR ARTIFICIAL OPENING ENDOSCOPIC: ICD-10-PCS | Performed by: SURGERY

## 2020-07-14 PROCEDURE — 0D164ZA BYPASS STOMACH TO JEJUNUM, PERCUTANEOUS ENDOSCOPIC APPROACH: ICD-10-PCS | Performed by: SURGERY

## 2020-07-14 PROCEDURE — 43644 LAP GASTRIC BYPASS/ROUX-EN-Y: CPT | Performed by: SURGERY

## 2020-07-14 PROCEDURE — 8E0W4CZ ROBOTIC ASSISTED PROCEDURE OF TRUNK REGION, PERCUTANEOUS ENDOSCOPIC APPROACH: ICD-10-PCS | Performed by: SURGERY

## 2020-07-14 DEVICE — SEAMGUARD STPL REINF INTUITIVE SUREFORM 60 GREEN: Type: IMPLANTABLE DEVICE | Site: ABDOMEN | Status: FUNCTIONAL

## 2020-07-14 RX ORDER — GABAPENTIN 300 MG/1
300 CAPSULE ORAL ONCE
Status: COMPLETED | OUTPATIENT
Start: 2020-07-14 | End: 2020-07-14

## 2020-07-14 RX ORDER — SODIUM CHLORIDE 9 MG/ML
125 INJECTION, SOLUTION INTRAVENOUS CONTINUOUS
Status: DISCONTINUED | OUTPATIENT
Start: 2020-07-14 | End: 2020-07-15

## 2020-07-14 RX ORDER — SCOLOPAMINE TRANSDERMAL SYSTEM 1 MG/1
1 PATCH, EXTENDED RELEASE TRANSDERMAL
Status: DISCONTINUED | OUTPATIENT
Start: 2020-07-17 | End: 2020-07-15 | Stop reason: HOSPADM

## 2020-07-14 RX ORDER — HEPARIN SODIUM 5000 [USP'U]/ML
5000 INJECTION, SOLUTION INTRAVENOUS; SUBCUTANEOUS
Status: COMPLETED | OUTPATIENT
Start: 2020-07-14 | End: 2020-07-14

## 2020-07-14 RX ORDER — ONDANSETRON 2 MG/ML
4 INJECTION INTRAMUSCULAR; INTRAVENOUS EVERY 6 HOURS SCHEDULED
Status: DISCONTINUED | OUTPATIENT
Start: 2020-07-14 | End: 2020-07-15 | Stop reason: HOSPADM

## 2020-07-14 RX ORDER — MEPERIDINE HYDROCHLORIDE 25 MG/ML
12.5 INJECTION INTRAMUSCULAR; INTRAVENOUS; SUBCUTANEOUS ONCE AS NEEDED
Status: DISCONTINUED | OUTPATIENT
Start: 2020-07-14 | End: 2020-07-14 | Stop reason: HOSPADM

## 2020-07-14 RX ORDER — ONDANSETRON 2 MG/ML
INJECTION INTRAMUSCULAR; INTRAVENOUS AS NEEDED
Status: DISCONTINUED | OUTPATIENT
Start: 2020-07-14 | End: 2020-07-14 | Stop reason: SURG

## 2020-07-14 RX ORDER — MIDAZOLAM HYDROCHLORIDE 2 MG/2ML
INJECTION, SOLUTION INTRAMUSCULAR; INTRAVENOUS AS NEEDED
Status: DISCONTINUED | OUTPATIENT
Start: 2020-07-14 | End: 2020-07-14 | Stop reason: SURG

## 2020-07-14 RX ORDER — SODIUM CHLORIDE 9 MG/ML
INJECTION, SOLUTION INTRAVENOUS AS NEEDED
Status: DISCONTINUED | OUTPATIENT
Start: 2020-07-14 | End: 2020-07-14 | Stop reason: HOSPADM

## 2020-07-14 RX ORDER — ACETAMINOPHEN 325 MG/1
975 TABLET ORAL EVERY 8 HOURS
Status: DISCONTINUED | OUTPATIENT
Start: 2020-07-14 | End: 2020-07-15 | Stop reason: HOSPADM

## 2020-07-14 RX ORDER — FENTANYL CITRATE 50 UG/ML
INJECTION, SOLUTION INTRAMUSCULAR; INTRAVENOUS AS NEEDED
Status: DISCONTINUED | OUTPATIENT
Start: 2020-07-14 | End: 2020-07-14 | Stop reason: SURG

## 2020-07-14 RX ORDER — PROPOFOL 10 MG/ML
INJECTION, EMULSION INTRAVENOUS AS NEEDED
Status: DISCONTINUED | OUTPATIENT
Start: 2020-07-14 | End: 2020-07-14 | Stop reason: SURG

## 2020-07-14 RX ORDER — NEOSTIGMINE METHYLSULFATE 1 MG/ML
INJECTION INTRAVENOUS AS NEEDED
Status: DISCONTINUED | OUTPATIENT
Start: 2020-07-14 | End: 2020-07-14 | Stop reason: SURG

## 2020-07-14 RX ORDER — LEVOFLOXACIN 5 MG/ML
750 INJECTION, SOLUTION INTRAVENOUS ONCE
Status: COMPLETED | OUTPATIENT
Start: 2020-07-14 | End: 2020-07-14

## 2020-07-14 RX ORDER — HYDROMORPHONE HCL 110MG/55ML
PATIENT CONTROLLED ANALGESIA SYRINGE INTRAVENOUS AS NEEDED
Status: DISCONTINUED | OUTPATIENT
Start: 2020-07-14 | End: 2020-07-14 | Stop reason: SURG

## 2020-07-14 RX ORDER — DEXAMETHASONE SODIUM PHOSPHATE 10 MG/ML
INJECTION, SOLUTION INTRAMUSCULAR; INTRAVENOUS AS NEEDED
Status: DISCONTINUED | OUTPATIENT
Start: 2020-07-14 | End: 2020-07-14 | Stop reason: SURG

## 2020-07-14 RX ORDER — FENTANYL CITRATE/PF 50 MCG/ML
50 SYRINGE (ML) INJECTION
Status: DISCONTINUED | OUTPATIENT
Start: 2020-07-14 | End: 2020-07-14 | Stop reason: HOSPADM

## 2020-07-14 RX ORDER — SCOLOPAMINE TRANSDERMAL SYSTEM 1 MG/1
1 PATCH, EXTENDED RELEASE TRANSDERMAL ONCE
Status: DISCONTINUED | OUTPATIENT
Start: 2020-07-14 | End: 2020-07-15 | Stop reason: HOSPADM

## 2020-07-14 RX ORDER — METOCLOPRAMIDE HYDROCHLORIDE 5 MG/ML
10 INJECTION INTRAMUSCULAR; INTRAVENOUS EVERY 6 HOURS PRN
Status: DISCONTINUED | OUTPATIENT
Start: 2020-07-14 | End: 2020-07-15 | Stop reason: HOSPADM

## 2020-07-14 RX ORDER — PANTOPRAZOLE SODIUM 40 MG/1
40 INJECTION, POWDER, FOR SOLUTION INTRAVENOUS
Status: DISCONTINUED | OUTPATIENT
Start: 2020-07-15 | End: 2020-07-15 | Stop reason: HOSPADM

## 2020-07-14 RX ORDER — ROCURONIUM BROMIDE 10 MG/ML
INJECTION, SOLUTION INTRAVENOUS AS NEEDED
Status: DISCONTINUED | OUTPATIENT
Start: 2020-07-14 | End: 2020-07-14 | Stop reason: SURG

## 2020-07-14 RX ORDER — CELECOXIB 200 MG/1
200 CAPSULE ORAL ONCE
Status: COMPLETED | OUTPATIENT
Start: 2020-07-14 | End: 2020-07-14

## 2020-07-14 RX ORDER — MAGNESIUM HYDROXIDE 1200 MG/15ML
LIQUID ORAL AS NEEDED
Status: DISCONTINUED | OUTPATIENT
Start: 2020-07-14 | End: 2020-07-14 | Stop reason: HOSPADM

## 2020-07-14 RX ORDER — SODIUM CHLORIDE, SODIUM LACTATE, POTASSIUM CHLORIDE, CALCIUM CHLORIDE 600; 310; 30; 20 MG/100ML; MG/100ML; MG/100ML; MG/100ML
100 INJECTION, SOLUTION INTRAVENOUS CONTINUOUS
Status: DISCONTINUED | OUTPATIENT
Start: 2020-07-14 | End: 2020-07-15 | Stop reason: HOSPADM

## 2020-07-14 RX ORDER — HEPARIN SODIUM 5000 [USP'U]/ML
5000 INJECTION, SOLUTION INTRAVENOUS; SUBCUTANEOUS
Status: DISCONTINUED | OUTPATIENT
Start: 2020-07-15 | End: 2020-07-14

## 2020-07-14 RX ORDER — MORPHINE SULFATE 4 MG/ML
4 INJECTION, SOLUTION INTRAMUSCULAR; INTRAVENOUS EVERY 2 HOUR PRN
Status: DISCONTINUED | OUTPATIENT
Start: 2020-07-14 | End: 2020-07-15 | Stop reason: HOSPADM

## 2020-07-14 RX ORDER — ACETAMINOPHEN 160 MG/5ML
975 SUSPENSION, ORAL (FINAL DOSE FORM) ORAL EVERY 8 HOURS
Status: DISCONTINUED | OUTPATIENT
Start: 2020-07-14 | End: 2020-07-15 | Stop reason: HOSPADM

## 2020-07-14 RX ORDER — LIDOCAINE HYDROCHLORIDE 10 MG/ML
INJECTION, SOLUTION EPIDURAL; INFILTRATION; INTRACAUDAL; PERINEURAL AS NEEDED
Status: DISCONTINUED | OUTPATIENT
Start: 2020-07-14 | End: 2020-07-14 | Stop reason: SURG

## 2020-07-14 RX ORDER — SIMETHICONE 80 MG
80 TABLET,CHEWABLE ORAL 4 TIMES DAILY PRN
Status: DISCONTINUED | OUTPATIENT
Start: 2020-07-14 | End: 2020-07-15 | Stop reason: HOSPADM

## 2020-07-14 RX ORDER — OXYCODONE HCL 5 MG/5 ML
5 SOLUTION, ORAL ORAL EVERY 4 HOURS PRN
Status: DISCONTINUED | OUTPATIENT
Start: 2020-07-14 | End: 2020-07-15 | Stop reason: HOSPADM

## 2020-07-14 RX ORDER — GLYCOPYRROLATE 0.2 MG/ML
INJECTION INTRAMUSCULAR; INTRAVENOUS AS NEEDED
Status: DISCONTINUED | OUTPATIENT
Start: 2020-07-14 | End: 2020-07-14 | Stop reason: SURG

## 2020-07-14 RX ORDER — ACETAMINOPHEN 325 MG/1
975 TABLET ORAL ONCE
Status: COMPLETED | OUTPATIENT
Start: 2020-07-14 | End: 2020-07-14

## 2020-07-14 RX ORDER — BUPIVACAINE HYDROCHLORIDE 5 MG/ML
INJECTION, SOLUTION EPIDURAL; INTRACAUDAL AS NEEDED
Status: DISCONTINUED | OUTPATIENT
Start: 2020-07-14 | End: 2020-07-14 | Stop reason: HOSPADM

## 2020-07-14 RX ORDER — PROMETHAZINE HYDROCHLORIDE 25 MG/ML
12.5 INJECTION, SOLUTION INTRAMUSCULAR; INTRAVENOUS EVERY 6 HOURS PRN
Status: DISCONTINUED | OUTPATIENT
Start: 2020-07-14 | End: 2020-07-15 | Stop reason: HOSPADM

## 2020-07-14 RX ORDER — HYDROMORPHONE HCL/PF 1 MG/ML
0.5 SYRINGE (ML) INJECTION
Status: DISCONTINUED | OUTPATIENT
Start: 2020-07-14 | End: 2020-07-14 | Stop reason: HOSPADM

## 2020-07-14 RX ORDER — ONDANSETRON 2 MG/ML
4 INJECTION INTRAMUSCULAR; INTRAVENOUS ONCE AS NEEDED
Status: DISCONTINUED | OUTPATIENT
Start: 2020-07-14 | End: 2020-07-14 | Stop reason: HOSPADM

## 2020-07-14 RX ORDER — HYDROMORPHONE HCL/PF 1 MG/ML
1 SYRINGE (ML) INJECTION EVERY 4 HOURS PRN
Status: DISCONTINUED | OUTPATIENT
Start: 2020-07-14 | End: 2020-07-15 | Stop reason: HOSPADM

## 2020-07-14 RX ORDER — DIPHENHYDRAMINE HYDROCHLORIDE 50 MG/ML
25 INJECTION INTRAMUSCULAR; INTRAVENOUS EVERY 6 HOURS PRN
Status: DISCONTINUED | OUTPATIENT
Start: 2020-07-14 | End: 2020-07-15 | Stop reason: HOSPADM

## 2020-07-14 RX ADMIN — METOCLOPRAMIDE 10 MG: 5 INJECTION, SOLUTION INTRAMUSCULAR; INTRAVENOUS at 22:24

## 2020-07-14 RX ADMIN — ONDANSETRON 4 MG: 2 INJECTION INTRAMUSCULAR; INTRAVENOUS at 19:33

## 2020-07-14 RX ADMIN — LIDOCAINE HYDROCHLORIDE 60 MG: 10 INJECTION, SOLUTION EPIDURAL; INFILTRATION; INTRACAUDAL; PERINEURAL at 14:20

## 2020-07-14 RX ADMIN — ACETAMINOPHEN 975 MG: 325 TABLET ORAL at 12:43

## 2020-07-14 RX ADMIN — METRONIDAZOLE 500 MG: 500 INJECTION, SOLUTION INTRAVENOUS at 14:15

## 2020-07-14 RX ADMIN — ROCURONIUM BROMIDE 50 MG: 10 INJECTION, SOLUTION INTRAVENOUS at 14:20

## 2020-07-14 RX ADMIN — FENTANYL CITRATE 50 MCG: 50 INJECTION, SOLUTION INTRAMUSCULAR; INTRAVENOUS at 14:57

## 2020-07-14 RX ADMIN — GLYCOPYRROLATE 0.4 MG: 0.2 INJECTION, SOLUTION INTRAMUSCULAR; INTRAVENOUS at 16:39

## 2020-07-14 RX ADMIN — FENTANYL CITRATE 100 MCG: 50 INJECTION, SOLUTION INTRAMUSCULAR; INTRAVENOUS at 14:20

## 2020-07-14 RX ADMIN — SODIUM CHLORIDE 125 ML/HR: 0.9 INJECTION, SOLUTION INTRAVENOUS at 12:53

## 2020-07-14 RX ADMIN — ACETAMINOPHEN 975 MG: 325 SUSPENSION ORAL at 22:19

## 2020-07-14 RX ADMIN — ONDANSETRON 4 MG: 2 INJECTION INTRAMUSCULAR; INTRAVENOUS at 14:33

## 2020-07-14 RX ADMIN — HYDROMORPHONE HYDROCHLORIDE 1 MG: 2 INJECTION, SOLUTION INTRAMUSCULAR; INTRAVENOUS; SUBCUTANEOUS at 14:48

## 2020-07-14 RX ADMIN — DEXAMETHASONE SODIUM PHOSPHATE 8 MG: 10 INJECTION, SOLUTION INTRAMUSCULAR; INTRAVENOUS at 14:33

## 2020-07-14 RX ADMIN — PHENYLEPHRINE HYDROCHLORIDE 150 MCG: 10 INJECTION INTRAVENOUS at 14:43

## 2020-07-14 RX ADMIN — ONDANSETRON 4 MG: 2 INJECTION INTRAMUSCULAR; INTRAVENOUS at 16:39

## 2020-07-14 RX ADMIN — HEPARIN SODIUM 5000 UNITS: 5000 INJECTION INTRAVENOUS; SUBCUTANEOUS at 13:00

## 2020-07-14 RX ADMIN — LEVOFLOXACIN: 5 INJECTION, SOLUTION INTRAVENOUS at 13:49

## 2020-07-14 RX ADMIN — PROPOFOL 200 MG: 10 INJECTION, EMULSION INTRAVENOUS at 14:20

## 2020-07-14 RX ADMIN — FENTANYL CITRATE 50 MCG: 50 INJECTION, SOLUTION INTRAMUSCULAR; INTRAVENOUS at 14:48

## 2020-07-14 RX ADMIN — CELECOXIB 200 MG: 200 CAPSULE ORAL at 12:43

## 2020-07-14 RX ADMIN — HYDROMORPHONE HYDROCHLORIDE 1 MG: 2 INJECTION, SOLUTION INTRAMUSCULAR; INTRAVENOUS; SUBCUTANEOUS at 15:03

## 2020-07-14 RX ADMIN — MIDAZOLAM 2 MG: 1 INJECTION INTRAMUSCULAR; INTRAVENOUS at 14:09

## 2020-07-14 RX ADMIN — NEOSTIGMINE METHYLSULFATE 3 MG: 1 INJECTION, SOLUTION INTRAVENOUS at 16:39

## 2020-07-14 RX ADMIN — SODIUM CHLORIDE: 0.9 INJECTION, SOLUTION INTRAVENOUS at 16:28

## 2020-07-14 RX ADMIN — ROCURONIUM BROMIDE 20 MG: 10 INJECTION, SOLUTION INTRAVENOUS at 14:59

## 2020-07-14 RX ADMIN — SODIUM CHLORIDE: 0.9 INJECTION, SOLUTION INTRAVENOUS at 15:03

## 2020-07-14 RX ADMIN — GABAPENTIN 300 MG: 300 CAPSULE ORAL at 12:43

## 2020-07-14 RX ADMIN — SODIUM CHLORIDE, SODIUM LACTATE, POTASSIUM CHLORIDE, AND CALCIUM CHLORIDE 100 ML/HR: .6; .31; .03; .02 INJECTION, SOLUTION INTRAVENOUS at 18:00

## 2020-07-14 RX ADMIN — SCOPALAMINE 1 PATCH: 1 PATCH, EXTENDED RELEASE TRANSDERMAL at 12:44

## 2020-07-14 NOTE — OP NOTE
OPERATIVE REPORT  PATIENT NAME: Eugenio King    :  1970  MRN: [id number]  Pt Location: [facility] OR ROOM 06    SURGERY DATE: 2020    Surgeon(s) and Role:     * Tesha Flannery MD - Primary     * Miquel Rico MD - Assisting    Preop Diagnosis:  Morbid obesity ([facility] ) [E66 01]  Polycystic ovaries [E28 2]  Gastroesophageal reflux disease, esophagitis presence not specified [K21 9]    Post-Op Diagnosis Codes:     * Morbid obesity ([facility] ) [E66 01]     * Polycystic ovaries [E28 2]     * Gastroesophageal reflux disease, esophagitis presence not specified [K21 9]    Procedure(s) (LRB):  BYPASS GASTRIC DAVID-EN-Y LAPAROSCOPIC W ROBOTICS W/ INTRAOPERATIVE EGD (N/A)    Specimen(s):  * No specimens in log *    Estimated Blood Loss:   20 ml    Drains:  * No LDAs found *    Anesthesia Type:   General    Operative Indications: Morbid obesity ([facility] ) [E66 01]  Polycystic ovaries [E28 2]  Gastroesophageal reflux disease, esophagitis presence not specified [K21 9]      Operative Findings:  Paraesophageal Hernia    Complications:   None    Procedure and Technique:  Robotic paraesophageal hernia repair  Robotic David-en-Y gastric bypass with intraop endoscopy   I was present for the entire procedure    Patient Disposition:  hemodynamically stable     No qualified resident was available  Assistant was necessary for instrument exchange and counter traction and assistance with stapling and intraop endoscopy    Indication:   Patient suffers from morbid obesity and associated co-morbidities  and failed to achieve any meaningful or sustainable weight loss and was therefore consented to undergo a laparoscopic David en Y Gastric Bypass  Risks and benefits were explained to the patient, patient consented for the procedure  Procedure: The patient was brought to the operating room and placed in a supine position  The patient received a dose of IV antibiotics and a dose of subcutaneous Heparin prior to the procedure   The patient was induced under general endotracheal anesthesia  The abdominal wall was prepped and draped under sterile conditions in the usual fashion  The procedure was started by obtaining access to the abdominal cavity using a Veress needle to the left side of the midline around 6 inches from the xiphoid the abdominal cavity was insufflated with CO2 to a pressure of 15 mmHg  After that, the abdomen was entered with an 8 mm trocar using an Optiview trocar under direct visualization  At that point, an 8 and 12 mm robotic trocars were placed on the right side of the abdominal wall, under direct visualization, and another 8 mm robotic trocar and 12 mm assistant trocar were placed on the left side of the abdominal wall, also under direct visualization  A TAP block was performed using 20 ml of Exparel mixed with saline and 0 5 % Marcaine for a total of 100 ml  The patient was then placed in a reverse Trendelenburg position  A Marleny retractor was placed through a small stab incision below the xiphoid and was used to retract the left lobe of the liver in a medial fashion, the robot was then docked and locked in place  An OG tube was placed to decompress the stomach and then removed immediately  The procedure was started by lifting the omentum in a cephalad fashion  The omentum was then split in half using the vessel sealer  The ligament of Treitz was identified and then the small bowel was transected with a 60 mm stapler using a white load  The mesentery of the small bowel was then transected using the vessel sealer,   After that, the distal small bowel was run for 150 cm in a way to obtain a 150 cm long Lexus limb  At that point, two enterotomies were made in the BP limb and the Lexus limb with hot scissors l, and the jejunojejunostomy was fashioned using a 60 mm stapler with a white load  After firing the stapler, the staple line was inspected and there was no evidence of bleeding and the staple line was well formed   The common enterotomy of the jejunojejunostomy was closed in two layers using 2-0 Vicryl running suture for the first layer and  2-0 V LOC in a running fashion for the second layer  The mesenteric defect of the small bowel was approximated using nonabsorbable suture in a running fashion  At that point, we turned our attention to the upper portion of the abdomen  The angle of His was then dissected using blunt dissection and the vessel sealer  The phrenoesophageal ligament was also dissected anteriorly and  a paraesophageal hernia was identified  The decision was then made to repair the hernia posteriorly  Right bill and left bill were dissected away from the hernia sac and skeletonized all the way down to the confluence  Esophagus was kept out of harm's way during the dissection  Both vagi were also identified and preserved  Dissection was carried all the way up to the level of the pulmonary vein  to obtain more length of the esophagus  The dissection was completed circumferentially around the esophagus  We had at least 5 cm of the lower esophagus in an intra-abdominal location by the end of the dissection  Hernia sac was completely dissected and excised  Right bill and left bill were then approximated using 0 Ethibond sutures placed in an interrupted fashion  After approximating the crura the anesthesiologist placed a 58 Mongolian bougie to size the hiatus    The pars flaccida was then opened and a gastric pouch was created with serial firings of the Sureform 60 mm intuitive  stapler with a green cartridge reinforced with Seamguard  After the formation of the gastric pouch, the staple lines of the pouch and the gastric remnant were inspected and appeared to be well formed and also appeared to be hemostatic  A new gastrojejunostomy anastomosis was then fashioned in an antecolic antegastric fashion in 2 layers  Outer layer was a running layer of 2-0 V lock suture and the inner  layer was a running 2-0 Vicryl suture    The [facility] defect was closed with a simple nonabsorbable 2 0 Ethibond suture in a figure-of-eight  Fashion  Upper endoscopy was then performed and showed no evidence of bubbles or bleeding at the suture line of the anastomosis or the staple line of the gastric pouch  The gastrojejunostomy was covered with an omental flap that was secured in place with an absorbable suture in a simple fashion  The [facility] liver retractor was removed  The 12 mm port was closed  with 1-0 Vicryl in a simple fashion  All the skin edges of the trocar sites were approximated with 4-0 Monocryl in a subcuticular inverted fashion  The patient was extubated and transferred to the PACU in stable condition      SIGNATURE: Sumaya Reilly MD  DATE: July 14, 2020  TIME: 4:27 PM

## 2020-07-14 NOTE — INTERVAL H&P NOTE
H&P reviewed  After examining the patient I find no changes in the patients condition since the H&P had been written      Vitals:    07/14/20 1241   BP: 111/67   Pulse: 86   Resp: 18   Temp: 97 6 °F (36 4 °C)   SpO2: 99%

## 2020-07-14 NOTE — PLAN OF CARE
Problem: PAIN - ADULT  Goal: Verbalizes/displays adequate comfort level or baseline comfort level  Description  Interventions:  - Encourage patient to monitor pain and request assistance  - Assess pain using appropriate pain scale  - Administer analgesics based on type and severity of pain and evaluate response  - Implement non-pharmacological measures as appropriate and evaluate response  - Consider cultural and social influences on pain and pain management  - Notify physician/advanced practitioner if interventions unsuccessful or patient reports new pain  Outcome: Progressing     Problem: INFECTION - ADULT  Goal: Absence or prevention of progression during hospitalization  Description  INTERVENTIONS:  - Assess and monitor for signs and symptoms of infection  - Monitor lab/diagnostic results  - Monitor all insertion sites, i e  indwelling lines, tubes, and drains  - Monitor endotracheal if appropriate and nasal secretions for changes in amount and color  - [place] appropriate cooling/warming therapies per order  - Administer medications as ordered  - Instruct and encourage patient and family to use good hand hygiene technique  - Identify and instruct in appropriate isolation precautions for identified infection/condition  Outcome: Progressing     Problem: SAFETY ADULT  Goal: Patient will remain free of falls  Description  INTERVENTIONS:  - Assess patient frequently for physical needs  -  Identify cognitive and physical deficits and behaviors that affect risk of falls    -  [place] fall precautions as indicated by assessment   - Educate patient/family on patient safety including physical limitations  - Instruct patient to call for assistance with activity based on assessment  - Modify environment to reduce risk of injury  - Consider OT/PT consult to assist with strengthening/mobility  Outcome: Progressing     Problem: DISCHARGE PLANNING  Goal: Discharge to home or other facility with appropriate resources  Description  INTERVENTIONS:  - Identify barriers to discharge w/patient and caregiver  - Arrange for needed discharge resources and transportation as appropriate  - Identify discharge learning needs (meds, wound care, etc )  - Arrange for interpretive services to assist at discharge as needed  - Refer to Case Management Department for coordinating discharge planning if the patient needs post-hospital services based on physician/advanced practitioner order or complex needs related to functional status, cognitive ability, or social support system  Outcome: Progressing     Problem: Knowledge Deficit  Goal: Patient/family/caregiver demonstrates understanding of disease process, treatment plan, medications, and discharge instructions  Description  Complete learning assessment and assess knowledge base    Interventions:  - Provide teaching at level of understanding  - Provide teaching via preferred learning methods  Outcome: Progressing     Problem: CARDIOVASCULAR - ADULT  Goal: Absence of cardiac dysrhythmias or at baseline rhythm  Description  INTERVENTIONS:  - Continuous cardiac monitoring, vital signs, obtain 12 lead EKG if ordered  - Administer antiarrhythmic and heart rate control medications as ordered  - Monitor electrolytes and administer replacement therapy as ordered  Outcome: Progressing     Problem: RESPIRATORY - ADULT  Goal: Achieves optimal ventilation and oxygenation  Description  INTERVENTIONS:  - Assess for changes in respiratory status  - Assess for changes in mentation and behavior  - Position to facilitate oxygenation and minimize respiratory effort  - Oxygen administered by appropriate delivery if ordered  - Initiate smoking cessation education as indicated  - Encourage broncho-pulmonary hygiene including cough, deep breathe, Incentive Spirometry  - Assess the need for suctioning and aspirate as needed  - Assess and instruct to report SOB or any respiratory difficulty  - Respiratory Therapy support as indicated  Outcome: Progressing     Problem: GASTROINTESTINAL - ADULT  Goal: Minimal or absence of nausea and/or vomiting  Description  INTERVENTIONS:  - Administer IV fluids if ordered to ensure adequate hydration  - Maintain NPO status until nausea and vomiting are resolved  - Nasogastric tube if ordered  - Administer ordered antiemetic medications as needed  - Provide nonpharmacologic comfort measures as appropriate  - Advance diet as tolerated, if ordered  - Consider nutrition services referral to assist patient with adequate nutrition and appropriate food choices  Outcome: Progressing     Problem: GENITOURINARY - ADULT  Goal: Maintains or returns to baseline urinary function  Description  INTERVENTIONS:  - Assess urinary function  - Encourage oral fluids to ensure adequate hydration if ordered  - Administer IV fluids as ordered to ensure adequate hydration  - Administer ordered medications as needed  - Offer frequent toileting  - Follow urinary retention protocol if ordered  Outcome: Progressing  Goal: Absence of urinary retention  Description  INTERVENTIONS:  - Assess patients ability to void and empty bladder  - Monitor I/O  - Bladder scan as needed  - Discuss with physician/AP medications to alleviate retention as needed  - Discuss catheterization for long term situations as appropriate  Outcome: Progressing     Problem: SKIN/TISSUE INTEGRITY - ADULT  Goal: Skin integrity remains intact  Description  INTERVENTIONS  - Identify patients at risk for skin breakdown  - Assess and monitor skin integrity  - Assess and monitor nutrition and hydration status  - Monitor labs (i e  albumin)  - Assess for incontinence   - Turn and reposition patient  - Assist with mobility/ambulation  - Relieve pressure over bony prominences  - Avoid friction and shearing  - Provide appropriate hygiene as needed including keeping skin clean and dry  - Evaluate need for skin moisturizer/barrier cream  - Collaborate with interdisciplinary team (i e  Nutrition, Rehabilitation, etc )   - Patient/family teaching  Outcome: Progressing  Goal: Incision(s), wounds(s) or drain site(s) healing without S/S of infection  Description  INTERVENTIONS  - Assess and document risk factors for skin impairment   - Assess and document dressing, incision, wound bed, drain sites and surrounding tissue  - Consider nutrition services referral as needed  - Oral mucous membranes remain intact  - Provide patient/ family education  Outcome: Progressing

## 2020-07-14 NOTE — ANESTHESIA PREPROCEDURE EVALUATION
Review of Systems/Medical History          Cardiovascular  Negative cardio ROS Exercise tolerance (METS): >4,     Pulmonary  Negative pulmonary ROS        GI/Hepatic    GERD ,  Hiatal hernia,             Endo/Other  Negative endo/other ROS   Obesity  morbid obesity   GYN  , Prior pregnancy/OB history : 7+ Parity: 4,          Hematology  Negative hematology ROS Anemia ,     Musculoskeletal  Negative musculoskeletal ROS        Neurology  Negative neurology ROS      Psychology   Negative psychology ROS Anxiety,              Physical Exam    Airway    Mallampati score: II  TM Distance: >3 FB  Neck ROM: full     Dental   No notable dental hx     Cardiovascular  Comment: Negative ROS, Cardiovascular exam normal    Pulmonary  Pulmonary exam normal     Other Findings        Anesthesia Plan  ASA Score- 3     Anesthesia Type- general with ASA Monitors  Additional Monitors:   Airway Plan: ETT  Plan Factors-    Induction- intravenous  Postoperative Plan-     Informed Consent- Anesthetic plan and risks discussed with patient

## 2020-07-15 VITALS
DIASTOLIC BLOOD PRESSURE: 82 MMHG | HEART RATE: 86 BPM | HEIGHT: 67 IN | SYSTOLIC BLOOD PRESSURE: 132 MMHG | TEMPERATURE: 98.5 F | BODY MASS INDEX: 40 KG/M2 | RESPIRATION RATE: 18 BRPM | OXYGEN SATURATION: 97 % | WEIGHT: 254.85 LBS

## 2020-07-15 LAB
ANION GAP SERPL CALCULATED.3IONS-SCNC: 10 MMOL/L (ref 4–13)
BUN SERPL-MCNC: 8 MG/DL (ref 5–25)
CALCIUM SERPL-MCNC: 8.9 MG/DL (ref 8.3–10.1)
CHLORIDE SERPL-SCNC: 105 MMOL/L (ref 100–108)
CO2 SERPL-SCNC: 25 MMOL/L (ref 21–32)
CREAT SERPL-MCNC: 0.67 MG/DL (ref 0.6–1.3)
ERYTHROCYTE [DISTWIDTH] IN BLOOD BY AUTOMATED COUNT: 12.4 % (ref 11.6–15.1)
GFR SERPL CREATININE-BSD FRML MDRD: 120 ML/MIN/1.73SQ M
GLUCOSE SERPL-MCNC: 120 MG/DL (ref 65–140)
HCT VFR BLD AUTO: 32.4 % (ref 34.8–46.1)
HGB BLD-MCNC: 10.4 G/DL (ref 11.5–15.4)
MCH RBC QN AUTO: 29.5 PG (ref 26.8–34.3)
MCHC RBC AUTO-ENTMCNC: 32.1 G/DL (ref 31.4–37.4)
MCV RBC AUTO: 92 FL (ref 82–98)
PLATELET # BLD AUTO: 360 THOUSANDS/UL (ref 149–390)
PMV BLD AUTO: 9.1 FL (ref 8.9–12.7)
POTASSIUM SERPL-SCNC: 4.1 MMOL/L (ref 3.5–5.3)
RBC # BLD AUTO: 3.52 MILLION/UL (ref 3.81–5.12)
SODIUM SERPL-SCNC: 140 MMOL/L (ref 136–145)
WBC # BLD AUTO: 10.04 THOUSAND/UL (ref 4.31–10.16)

## 2020-07-15 PROCEDURE — NC001 PR NO CHARGE: Performed by: SURGERY

## 2020-07-15 PROCEDURE — 85027 COMPLETE CBC AUTOMATED: CPT | Performed by: SURGERY

## 2020-07-15 PROCEDURE — 99024 POSTOP FOLLOW-UP VISIT: CPT | Performed by: SURGERY

## 2020-07-15 PROCEDURE — C9113 INJ PANTOPRAZOLE SODIUM, VIA: HCPCS | Performed by: SURGERY

## 2020-07-15 PROCEDURE — 80048 BASIC METABOLIC PNL TOTAL CA: CPT | Performed by: SURGERY

## 2020-07-15 RX ORDER — KETOROLAC TROMETHAMINE 30 MG/ML
15 INJECTION, SOLUTION INTRAMUSCULAR; INTRAVENOUS EVERY 6 HOURS SCHEDULED
Status: DISCONTINUED | OUTPATIENT
Start: 2020-07-15 | End: 2020-07-15 | Stop reason: HOSPADM

## 2020-07-15 RX ORDER — DEXAMETHASONE SODIUM PHOSPHATE 4 MG/ML
10 INJECTION, SOLUTION INTRA-ARTICULAR; INTRALESIONAL; INTRAMUSCULAR; INTRAVENOUS; SOFT TISSUE ONCE
Status: COMPLETED | OUTPATIENT
Start: 2020-07-15 | End: 2020-07-15

## 2020-07-15 RX ADMIN — ONDANSETRON 4 MG: 2 INJECTION INTRAMUSCULAR; INTRAVENOUS at 02:51

## 2020-07-15 RX ADMIN — ACETAMINOPHEN 975 MG: 325 SUSPENSION ORAL at 10:14

## 2020-07-15 RX ADMIN — METRONIDAZOLE 500 MG: 500 INJECTION, SOLUTION INTRAVENOUS at 00:05

## 2020-07-15 RX ADMIN — ACETAMINOPHEN 975 MG: 325 SUSPENSION ORAL at 06:02

## 2020-07-15 RX ADMIN — SODIUM CHLORIDE, SODIUM LACTATE, POTASSIUM CHLORIDE, AND CALCIUM CHLORIDE 100 ML/HR: .6; .31; .03; .02 INJECTION, SOLUTION INTRAVENOUS at 13:11

## 2020-07-15 RX ADMIN — ONDANSETRON 4 MG: 2 INJECTION INTRAMUSCULAR; INTRAVENOUS at 15:05

## 2020-07-15 RX ADMIN — DEXAMETHASONE SODIUM PHOSPHATE 10 MG: 4 INJECTION, SOLUTION INTRA-ARTICULAR; INTRALESIONAL; INTRAMUSCULAR; INTRAVENOUS; SOFT TISSUE at 10:14

## 2020-07-15 RX ADMIN — METRONIDAZOLE 500 MG: 500 INJECTION, SOLUTION INTRAVENOUS at 06:02

## 2020-07-15 RX ADMIN — SODIUM CHLORIDE, SODIUM LACTATE, POTASSIUM CHLORIDE, AND CALCIUM CHLORIDE 100 ML/HR: .6; .31; .03; .02 INJECTION, SOLUTION INTRAVENOUS at 02:50

## 2020-07-15 RX ADMIN — ONDANSETRON 4 MG: 2 INJECTION INTRAMUSCULAR; INTRAVENOUS at 08:23

## 2020-07-15 RX ADMIN — PANTOPRAZOLE SODIUM 40 MG: 40 INJECTION, POWDER, FOR SOLUTION INTRAVENOUS at 08:22

## 2020-07-15 RX ADMIN — SODIUM CHLORIDE, SODIUM LACTATE, POTASSIUM CHLORIDE, AND CALCIUM CHLORIDE 1000 ML: .6; .31; .03; .02 INJECTION, SOLUTION INTRAVENOUS at 10:15

## 2020-07-15 RX ADMIN — KETOROLAC TROMETHAMINE 15 MG: 30 INJECTION, SOLUTION INTRAMUSCULAR at 11:35

## 2020-07-15 RX ADMIN — OXYCODONE HYDROCHLORIDE 5 MG: 5 SOLUTION ORAL at 02:07

## 2020-07-15 NOTE — UTILIZATION REVIEW
Initial Clinical Review    Elective  IP surgical procedure  Age/Sex: 52 y o  female  Surgery Date: 7/14/2020  Procedure: BYPASS GASTRIC DAVID-EN-Y LAPAROSCOPIC W ROBOTICS W/ INTRAOPERATIVE EGD (N/A)  Anesthesia: General  Operative Findings: Paraesophageal Hernia     POD#1 Progress Note:  s/p robotic PEHR and RYGB  Mild tachycardia  Bolus 1L IVF for tachycardia to ensure no dehydratin  C/o mild back and L shoulder pain  Tolerating liquid diet with mild nausea but no vomiting  Toradol and Decadron this am  Ambulating and usingIS      Admission Orders: Date/Time/Statement: Admission Orders (From admission, onward)     Ordered        07/14/20 1730  Inpatient Admission  Once                   Orders Placed This Encounter   Procedures    Inpatient Admission     Standing Status:   Standing     Number of Occurrences:   1     Order Specific Question:   Admitting Physician     Answer:   MIMI Crain [[id number]]     Order Specific Question:   Level of Care     Answer:   Med Surg [16]     Order Specific Question:   Bed Type     Answer:   Bariatric [1]     Order Specific Question:   Estimated length of stay     Answer:   Inpatient Only Surgery     Vital Signs:   07/15/20 1056 98 8 °F (37 1 °C) 108Abnormal  18 133/76 96 % -- None (Room air) Lying   07/15/20 0651 98 1 °F (36 7 °C) 112Abnormal  18 133/78 96 % -- None (Room air) Lying   07/15/20 0252 99 3 °F (37 4 °C) 109Abnormal  16 137/73 98 % -- None (Room air) Lying   07/14/20 2342 97 6 °F (36 4 °C) 105 18 144/73 98 % -- None (Room air) Sitting   07/14/20 2115 -- 98 18 129/86 98 % -- None (Room air) Sitting   07/14/20 2015 -- 96 20 99/88 98 % -- None (Room air) Sitting   07/14/20 1915 96 8 °F (36 °C) 98 18 119/68 93 % -- None (Room air) Sitting   07/14/20 1845 -- 99 17 120/66 96 % -- None (Room air) Sitting   07/14/20 1815 97 3 °F (36 3 °C) 99 16 138/76 94 % -- None (Room air) Sitting   07/14/20 1746 97 9 °F (36 6 °C) 100 14 141/69 97 % -- None (Room air) --   07/14/20 0416 -- 98 14 141/74 100 % 3 L/min Nasal cannula --   07/14/20 1714 97 5 °F (36 4 °C) 98 14 137/71 100 % -- Simple mask      Lab Results   Component Value Date     WBC 10 04 07/15/2020     HGB 10 4 (L) 07/15/2020     HCT 32 4 (L) 07/15/2020     MCV 92 07/15/2020      07/15/2020     MCH 29 5 07/15/2020     MCHC 32 1 07/15/2020     RDW 12 4 07/15/2020     MPV 9 1 07/15/2020   , CMP:         Lab Results   Component Value Date     SODIUM 140 07/15/2020     K 4 1 07/15/2020      07/15/2020     CO2 25 07/15/2020     BUN 8 07/15/2020     CREATININE 0 67 07/15/2020     CALCIUM 8 9 07/15/2020     EGFR 120 07/15/2020       Diet: Bariatric Clears  Mobility: Up as tolerated; ambulate q shift  DVT Prophylaxis: SCDs  TELEMETRY    Medications/Pain Control:   Scheduled Medications:  Medications:  acetaminophen 975 mg Oral Q8H   Or      acetaminophen 975 mg Oral Q8H   ketorolac 15 mg Intravenous Q6H VIMAL   ondansetron 4 mg Intravenous Q6H VIMAL   pantoprazole 40 mg Intravenous Q24H [facility]   scopolamine 1 patch Transdermal Once   [START ON 7/17/2020] scopolamine 1 patch Transdermal Q72H     Continuous IV Infusions:  lactated ringers 100 mL/hr Intravenous Continuous     PRN Meds:  diphenhydrAMINE 25 mg Intravenous Q6H PRN   HYDROmorphone 1 mg Intravenous Q4H PRN   metoclopramide 10 mg Intravenous Q6H PRN x 1 7/14   morphine injection 4 mg Intravenous Q2H PRN   oxyCODONE 5 mg Oral Q4H PRN x 1 7/15   phenol 2 spray Mouth/Throat Q2H PRN   promethazine 12 5 mg Intravenous Q6H PRN   simethicone 80 mg Oral 4x Daily PRN     7/15 Decadron  10 mg IV X 1      Network Utilization Review Department  [email or web address]  ATTENTION: Please call with any questions or concerns to [phone] and carefully listen to the prompts so that you are directed to the right person   All voicemails are confidential   Farrel Bodily all requests for admission clinical reviews, approved or denied determinations and any other requests to dedicated fax number below belonging to the campus where the patient is receiving treatment   List of dedicated fax numbers for the Facilities:  [address] DENIALS (Administrative/Medical Necessity) [phone]   [address] (Maternity/NICU/Pediatrics) [phone]   Jefferson Saha [phone]   Maegan Galvan [phone]   Delorise Los [phone]   [facility] [phone]   [address] [facility] [address] [facility] [phone]   [facility]  [phone]   [address] [facility] [address]  [address] [address] [address] [facility] [phone]

## 2020-07-15 NOTE — DISCHARGE INSTRUCTIONS
Bariatric/Weight Loss Surgery  Hospital Discharge Instructions  1  ACTIVITY:  a  Progress as feels comfortable - a good rule is:  if you are doing something and it begins to hurt, stop doing the activity  Walk every hour while at home  b  Luca Avila may walk stairs if you do so slowly  c  You may shower 48 hours after surgery  d  Use your incentive spirometer 10 times per hour while awake for 1 week  e  Do NOT drive for 48 hours after surgery  No driving 24 hours after taking certain prescription pain medications   Examples of such medication are Percocet, Darvocet, Oxycodone, Tylenol #3, and Tylenol with Codeine  Follow your pharmacists orders  2  DIET  a  Stay on a liquid diet for 7 days after your surgery date, sipping slowly  Refer to your manual for examples of choices  Remember to keep your liquids sugar free or low calorie  You may have protein drinks  Make sure to drink 48 to 64 ounces per day of fluids  b  Giselbeny Avila may advance to a pureed diet one week after surgery as instructed by your diet progression pamphlet  Once you get approval from your surgeon at your first post operative visit you may advance to the soft diet  3  MEDICATIONS:  a  The abdominal nerve block will wear off during the first 1-2 days that you are home, and you may become sore  Continue to take your Tylenol and your pain medication as instructed  b  Start vitamins and minerals when you get home  c  Anti-acid medication as per prescription  d  Other medications as indicated on the Physician Patient Discharge Instructions form given to you at the time of discharge  e  Make sure that you are splitting your pill or tablet medications in halves or fourths or even crushing them before you take them  Capsules should be opened and mixed with water or jello  You need to do this for at least 4 weeks after surgery  Eventually you will be able to take your medications the regular way as they were prescribed     f  Luca Avila will need to consult with your Family Doctor in regards to all your prescribed medication, particularly those for blood pressure and diabetes  As you lose weight, medical conditions may change, requiring an alteration or elimination of the drug dose  g  DO NOT TAKE BIRTH CONTROL(BC) MEDICATIONS, INSERT BC VAGINAL RINGS, OR PLACE IUD OR ANY OTHER BC METHODS UNTIL 31 DAYS FROM DAY OF DISCHARGE FROM HOSPITAL  THIS PLACES YOU AT HIGH RISK FOR A POTENTIALLY LIFE THREATENING BLOOD CLOT  Remember to always use barrier methods for birth control and speak to your GYN about using two forms of birth control to start 31 days after surgery  It is very important to avoid pregnancy until at least 18-24 months after surgery  4  INCISION CARE  a  You may shower and get incisions wet 2 days after surgery  No soaking tub baths or swimming for 30 days after surgery  Keep abdominal area and incisions clean  Use soap and water to create a good lather and rinse off  Do not scrub incisions  b  If you have a drain, empty the drain as the nurses instructed  5  FOLLOW-UP APPOINTMENT should be made for one week after discharge  Call surgeons office at [phone] to schedule an appointment      6  CALL YOUR DOCTOR FOR:  pain not controlled by pain medications, a temperature greater than 101 5° F, any increase or change in drainage or redness from any incision, any vomiting or inability to keep liquids down, shortness of breath, shoulder pain, or bleeding

## 2020-07-15 NOTE — NURSING NOTE
Discharge to home verbal understanding discharge instructions  Aware to get her medications from [address]

## 2020-07-15 NOTE — PROGRESS NOTES
Progress Note - Bariatric Surgery   Renate De Dios 52 y o  female MRN: [id number]  Unit/Bed#: E5 -01 Encounter: [id number]      Subjective/Objective     SUBJECTIVE     Patient with morbid obesity and associated comorbidities, POD 1 s/p robotic PEHR and RYGB  Seen and examined at the bedside this AM   Incisional pain adequately controlled w/oral/IV pain medication  C/o mild back and L shoulder pain  Tolerating liquid diet with mild nausea but no vomiting  Voiding adequately postop  Denies flatus or BMs  Ambulating without assistance and using the incentive spirometer  OBJECTIVE    /76 (BP Location: Right arm)   Pulse (!) 108   Temp 98 8 °F (37 1 °C) (Temporal)   Resp 18   Ht 5' 7" (1 702 m)   Wt 116 kg (254 lb 13 6 oz)   LMP 04/14/2020   SpO2 96%   Breastfeeding No   BMI 39 92 kg/m²       Intake/Output Summary (Last 24 hours) at 7/15/2020 1123  Last data filed at 7/15/2020 5020  Gross per 24 hour   Intake 4353 33 ml   Output 1320 ml   Net 3033 33 ml       Invasive Devices     Peripheral Intravenous Line            Peripheral IV 07/14/20 Left Forearm less than 1 day                ROS: 10-point system completed  All negative except see HPI  Physical Exam    General Appearance:    Alert, cooperative, no distress, appears stated age   Head:    Normocephalic, without obvious abnormality, atraumatic   Lungs:     Respirations unlabored   Heart:    Regular rate and rhythm   Abdomen:     Soft, appropriate tenderness to palpation,  no masses, no organomegaly, mildly distended  Incisions are clean, dry and intact  Abdomen is benign with no peritoneal findings  Extremities:   Extremities normal, atraumatic, no cyanosis or edema   Neurologic:  Psych:   Normal strength and sensation      Normal mood and affect       Lab, Imaging and other studies:  I have personally reviewed pertinent lab results    , CBC:   Lab Results   Component Value Date    WBC 10 04 07/15/2020    HGB 10 4 (L) 07/15/2020 HCT 32 4 (L) 07/15/2020    MCV 92 07/15/2020     07/15/2020    MCH 29 5 07/15/2020    MCHC 32 1 07/15/2020    RDW 12 4 07/15/2020    MPV 9 1 07/15/2020   , CMP:   Lab Results   Component Value Date    SODIUM 140 07/15/2020    K 4 1 07/15/2020     07/15/2020    CO2 25 07/15/2020    BUN 8 07/15/2020    CREATININE 0 67 07/15/2020    CALCIUM 8 9 07/15/2020    EGFR 120 07/15/2020        Latest radiology: No results found  VTE Mechanical Prophylaxis: sequential compression device    ASSESSMENT  52 y o  female, POD 1 from robotic PEHR and RYGB for morbid obesity and associated comorbidities; currently undergoing a stable post-op course  Mildly tachycardic, to low 100s this morning  Likely related to nausea and activity  PLAN  1) Diet/IVF  Encourage PO fluid intake  Will bolus 1L IVF for tachycardia to ensure no dehydratin  2) GI/DVT prophylaxis w/SCDs  Encourage ambulation  3) Pain and nausea management  Will adminster toradol and decadron this AM   4) Finish periop IV abx  5) Home medications reviewed and restarted as appropriate    Dispo: Anticipate discharge home this afternoon if tachycardia improves       Plan of care was discussed with patient and patient's nurse  Care plan discussed with Dr Chai Almodovar MD  Bariatric Surgery Fellow  7/15/2020  11:23 AM

## 2020-07-15 NOTE — DISCHARGE SUMMARY
Discharge Summary - May Sampson 52 y o  female MRN: [id number]    Unit/Bed#: E5 -01 Encounter: [id number]      Pre-Operative Diagnosis: Pre-Op Diagnosis Codes:     * Morbid obesity ([address] ) [E66 01]     * Polycystic ovaries [E28 2]     * Gastroesophageal reflux disease, esophagitis presence not specified [K21 9]    Post-Operative Diagnosis: Post-Op Diagnosis Codes:     * Morbid obesity ([address] ) [E66 01]     * Polycystic ovaries [E28 2]     * Gastroesophageal reflux disease, esophagitis presence not specified [K21 9]    Procedures Performed:  Procedure(s):  BYPASS GASTRIC DAVID-EN-Y LAPAROSCOPIC W ROBOTICS W/ INTRAOPERATIVE EGD    Surgeon: Soo Valero MD    See H & P for full details of admission and Operative Note for full details of operations performed  Patient tolerated surgery well without complications  On the morning of postoperative Day 1 the patient had mild nausea and abdominal pain, but tolerated bariatric clears well without vomiting  Able to ambulate and voiding independently  The patient was deemed ready for discharge home  Patient was seen and examined prior to discharge  Provisions for Follow-Up Care:  See After Visit Summary/Discharge Instructions for information related to follow-up care and home orders  Disposition: Home, in stable condition  Planned Readmission: No    Discharge Medications:  See After Visit Summary/Discharge Instructions for reconciled discharge medications provided to patient and family  Post Operative instructions: Reviewed with patient and/or family      Signature:   Rivera Kelsey MD  Date: 7/15/2020 Time: 4:53 PM

## 2020-07-15 NOTE — UTILIZATION REVIEW
Notification of Inpatient Admission/Inpatient Authorization Request   This is a Notification of Inpatient Admission for [address]  Be advised that this patient was admitted to our facility under Inpatient Status  Contact Alicia Covarrubias at [phone] for additional admission information  [facility] DEPT  DEDICATED [phone]  Patient Name:   Keily Beach   YOB: 1970       [address]   [address]:   [address]   Tax ID: [id number]  NPI: [id number] Attending Provider/NPI:  Phone:  Address: French Zhang, Deneen Barth [[id number]]  [phone]  Same as the facility   Place of Service Code: 24 Place of Service Name:  [address] [place] [address]   Start Date: 7/14/20 1730 Discharge Date & Time: No discharge date for patient encounter  Type of Admission: Inpatient Status Discharge Disposition   (if discharged): Final discharge disposition not confirmed   Patient Diagnoses: Morbid obesity ([address] ) [E66 01]  Polycystic ovaries [E28 2]  Gastroesophageal reflux disease, esophagitis presence not specified [K21 9]     Orders: Admission Orders (From admission, onward)     Ordered        07/14/20 1730  Inpatient Admission  Once                    Assigned Utilization Review Contact: [address] [place] [address] Utilization Review Department  Phone: [phone]; Fax [phone]  Email: [email or web address]   ATTENTION PAYERS: Please call the assigned Utilization  directly with any questions or concerns ALL voicemails in the department are confidential  Send all requests for admission clinical reviews, approved or denied determinations and any other requests to dedicated fax number belonging to the campus where the patient is receiving treatment

## 2020-07-16 ENCOUNTER — TELEPHONE (OUTPATIENT)
Dept: MEDSURG UNIT | Facility: HOSPITAL | Age: 50
End: 2020-07-16

## 2020-07-16 NOTE — TELEPHONE ENCOUNTER
Post op follow up phone call completed  Pt is sipping liquids, using IS as instructed, reinforced importance of using IS to help prevent pneumonia  Ambulating about home without difficulty  Minimal pain, not using Oxycodone  Reaffirmed examples of liquid diet over the next week  Pt stated understanding about discharge instructions and medication adjustments  Follow up appt with surgeon scheduled for next week  Instructed to call with any additional questions or concerns

## 2020-07-16 NOTE — UTILIZATION REVIEW
Notification of Discharge  This is a Notification of Discharge from our facility [address]  Please be advised that this patient has been discharge from our facility  Below you will find the admission and discharge date and time including the patients disposition  PRESENTATION DATE: 7/14/2020 11:58 AM    IP ADMISSION DATE: 7/14/20 1730   DISCHARGE DATE: 7/15/2020  6:43 PM  DISPOSITION: Home/Self Care Home/Self Care   Admission Orders listed below:  Admission Orders (From admission, onward)     Ordered        07/14/20 1730  Inpatient Admission  Once                   Please contact the UR Department if additional information is required to close this patient's authorization/case  Qian Seat  Network Utilization Review Department  Main: [phone] x carefully listen to the prompts  All voicemails are confidential   [email or web address]  Send all requests for admission clinical reviews, approved or denied determinations and any other requests to dedicated fax number below belonging to the campus where the patient is receiving treatment   List of dedicated fax numbers:  [address] DENIALS (Administrative/Medical Necessity) [phone]   [address] (Maternity/NICU/Pediatrics) [phone]   Galilea Thompson [phone]   Karo Prince [phone]   Betty Delaney [phone]   [address] [phone]   [facility]  [phone]   [address] [facility] [address]  [address] [address] [address] [facility] [phone]

## 2020-07-24 ENCOUNTER — OFFICE VISIT (OUTPATIENT)
Dept: BARIATRICS | Facility: CLINIC | Age: 50
End: 2020-07-24

## 2020-07-24 VITALS
BODY MASS INDEX: 38.45 KG/M2 | RESPIRATION RATE: 20 BRPM | SYSTOLIC BLOOD PRESSURE: 118 MMHG | DIASTOLIC BLOOD PRESSURE: 70 MMHG | HEIGHT: 67 IN | WEIGHT: 245 LBS | TEMPERATURE: 97.5 F

## 2020-07-24 DIAGNOSIS — Z98.84 BARIATRIC SURGERY STATUS: Primary | ICD-10-CM

## 2020-07-24 DIAGNOSIS — E66.01 MORBID (SEVERE) OBESITY DUE TO EXCESS CALORIES (HCC): Primary | ICD-10-CM

## 2020-07-24 PROCEDURE — 99024 POSTOP FOLLOW-UP VISIT: CPT | Performed by: SURGERY

## 2020-07-24 PROCEDURE — RECHECK: Performed by: DIETITIAN, REGISTERED

## 2020-07-24 NOTE — PROGRESS NOTES
FIRST POST-OPERATIVE VISIT - BARIATRIC SURGERY  Eliseo Wises 52 y o  female MRN: [id number]  Unit/Bed#:  Encounter: [id number]      HPI:  Katerina Delgadillo is a 52 y o  female who presents for the 1st postoperative visit following a laparoscopic David-en-Y gastric bypass      Review of Systems    Historical Information   Past Medical History:   Diagnosis Date    Anemia     "mild"    Anxiety     occas    Colon polyp     GERD (gastroesophageal reflux disease)     Heart murmur     "with pregnancy than went away"    Hiatal hernia     History of kidney stones     History of transfusion     "years ago after a miscarriage"    Obesity     PCOS (polycystic ovarian syndrome)     Shortness of breath     occas    Wears glasses      Past Surgical History:   Procedure Laterality Date    COLONOSCOPY  11/2019    DILATION AND CURETTAGE OF UTERUS      D&E    EGD      VT LAP GASTRIC BYPASS/DAVID-EN-Y N/A 7/14/2020    Procedure: BYPASS GASTRIC DAVID-EN-Y LAPAROSCOPIC W ROBOTICS W/ INTRAOPERATIVE EGD;  Surgeon: Bev Aranda MD;  Location: [facility];  Service: Bariatrics    UPPER GASTROINTESTINAL ENDOSCOPY  11/2019     Social History   Social History     Substance and Sexual Activity   Alcohol Use No     Social History     Substance and Sexual Activity   Drug Use No     Social History     Tobacco Use   Smoking Status Former Smoker   Smokeless Tobacco Never Used     Family History: non-contributory    Meds/Allergies   all medications and allergies reviewed  Allergies   Allergen Reactions    Penicillins Hives    Cephalosporins      Avoids due to penicllin allergy       Objective   First Vitals:   @VSFIRST2(5,8,6,7,9,11,14,10:FIRST)@    Current Vitals:   Blood Pressure: 118/70 (07/24/20 0947)  Temperature: 97 5 °F (36 4 °C) (07/24/20 0947)  Respirations: 20 (07/24/20 0947)  Height: 5' 7" (170 2 cm) (07/24/20 0947)  Weight - Scale: 111 kg (245 lb) (07/24/20 0947)    [unfilled]    Invasive Devices     None                 Physical Exam     Not in acute distress  Abdomen soft non tender non distended  Surgical incisions at trocar sites clean and dry no discharge no erythema no tenderness healing well    Lab Results: I have personally reviewed pertinent lab results  Imaging: I have personally reviewed pertinent reports  EKG, Pathology, and Other Studies: I have personally reviewed pertinent reports  Code Status: [unfilled]  Advance Directive and Living Will:      Power of :    POLST:      Assessment/Plan :      Patient is presenting for the first postoperative visit, patient hospital stay was uneventful without any complications, patient is doing well, has no complaints, is taking vitamins as instructed, currently tolerating the blenderized diet, will advance to soft diet  Patient will also be meeting with our dietician today to review her vitamin and mineral supplements and also go over her diet and emphasize postoperative commitment and compliance  The patient was also instructed to start exercising on a regular basis  However, I recommended no heavy lifting, or weight exercises for another 2 weeks  F/U in 4 weeks  Patient was instructed to call if develops nausea, vomiting, fever or chills

## 2020-07-24 NOTE — PROGRESS NOTES
Weight Management Nutrition Note     Diagnosis: Morbid Obesity    Bariatric Surgeon: Dr Ha Duncan    Surgery: Gastric Bypass Laparoscopic    Class: first post op note    Topics discussed today include:     fluid goals post op, protein goals post op, constipation, chew food well, exercise, diet progression, dumping syndrome, protein supplems, vitamin/mineral supplements, calcium supplements, additional vitamin B12, iron supplements, fat soluble vitamins  and provided with sample of Protein 2 o      present and supportive  Patient was able to verbalize basic diet (protein, fluid, vitamin and mineral) recommendations and possible nutrition-related complications   Yes

## 2020-08-01 ENCOUNTER — OFFICE VISIT (OUTPATIENT)
Dept: URGENT CARE | Facility: CLINIC | Age: 50
End: 2020-08-01
Payer: COMMERCIAL

## 2020-08-01 VITALS
SYSTOLIC BLOOD PRESSURE: 181 MMHG | WEIGHT: 242.2 LBS | BODY MASS INDEX: 38.01 KG/M2 | HEIGHT: 67 IN | OXYGEN SATURATION: 96 % | HEART RATE: 87 BPM | DIASTOLIC BLOOD PRESSURE: 77 MMHG | RESPIRATION RATE: 16 BRPM

## 2020-08-01 DIAGNOSIS — N39.0 URINARY TRACT INFECTION WITHOUT HEMATURIA, SITE UNSPECIFIED: Primary | ICD-10-CM

## 2020-08-01 DIAGNOSIS — R35.0 URINARY FREQUENCY: ICD-10-CM

## 2020-08-01 LAB
SL AMB  POCT GLUCOSE, UA: ABNORMAL
SL AMB LEUKOCYTE ESTERASE,UA: ABNORMAL
SL AMB POCT BILIRUBIN,UA: ABNORMAL
SL AMB POCT BLOOD,UA: ABNORMAL
SL AMB POCT CLARITY,UA: ABNORMAL
SL AMB POCT COLOR,UA: ABNORMAL
SL AMB POCT KETONES,UA: ABNORMAL
SL AMB POCT NITRITE,UA: ABNORMAL
SL AMB POCT PH,UA: 5
SL AMB POCT SPECIFIC GRAVITY,UA: 1.02
SL AMB POCT URINE PROTEIN: ABNORMAL
SL AMB POCT UROBILINOGEN: 0.2

## 2020-08-01 PROCEDURE — 99203 OFFICE O/P NEW LOW 30 MIN: CPT | Performed by: PHYSICIAN ASSISTANT

## 2020-08-01 PROCEDURE — 81002 URINALYSIS NONAUTO W/O SCOPE: CPT | Performed by: PHYSICIAN ASSISTANT

## 2020-08-01 PROCEDURE — G0382 LEV 3 HOSP TYPE B ED VISIT: HCPCS | Performed by: PHYSICIAN ASSISTANT

## 2020-08-01 PROCEDURE — 87086 URINE CULTURE/COLONY COUNT: CPT | Performed by: PHYSICIAN ASSISTANT

## 2020-08-01 PROCEDURE — 99283 EMERGENCY DEPT VISIT LOW MDM: CPT | Performed by: PHYSICIAN ASSISTANT

## 2020-08-01 RX ORDER — SULFAMETHOXAZOLE AND TRIMETHOPRIM 200; 40 MG/5ML; MG/5ML
20 SUSPENSION ORAL 2 TIMES DAILY
Qty: 280 ML | Refills: 0 | Status: SHIPPED | OUTPATIENT
Start: 2020-08-01 | End: 2020-08-08

## 2020-08-01 NOTE — PATIENT INSTRUCTIONS

## 2020-08-02 LAB — BACTERIA UR CULT: NORMAL

## 2020-08-05 NOTE — PROGRESS NOTES
[address] [facility]        NAME: Juve Valencia is a 52 y o  female  : 1970    MRN: [id number]  DATE: 2020  TIME: 2:22 AM    Assessment and Plan   Urinary tract infection without hematuria, site unspecified [N39 0]  1  Urinary tract infection without hematuria, site unspecified  sulfamethoxazole-trimethoprim (BACTRIM) 200-40 mg/5 mL suspension    Urine culture   2  Urinary frequency  POCT urine dip         Patient Instructions       Follow up with PCP in 3-5 days  Proceed to  ER if symptoms worsen  Chief Complaint     Chief Complaint   Patient presents with    Urinary Tract Infection     Pt reports urinary malodor, frequency, urgency, urinated small amounts at a time, not feeling empty after urination, and L flank pain  Pt reports recent gastric bypass 20, incisional scab feel off today, pt concerned for infection and needs direction for care  History of Present Illness       61-year-old female presents the clinic with dysuria, frequency, urgency, decreased amounts of urine when urinating, abdominal pain on left flank pain  Patient states that symptoms started several days ago  Patient had recent gastric bypass surgery 2 weeks ago and is also concerned because the incisional scar above her belly button had a scab that fell off today  Patient is concerned about possible infection and would like to have it checked  Review of Systems   Review of Systems   Constitutional: Negative for chills and fever  Gastrointestinal: Positive for abdominal pain  Negative for nausea and vomiting  Genitourinary: Positive for difficulty urinating, dysuria, flank pain, frequency and urgency  Negative for hematuria  Neurological: Negative for dizziness, light-headedness and headaches           Current Medications       Current Outpatient Medications:     cetirizine (ZyrTEC) 10 mg tablet, Take 10 mg by mouth daily as needed , Disp: , Rfl:     Cholecalciferol (VITAMIN D3) 1 25 MG (86575 UT) CAPS, Take 50,000 Units by mouth once a week , Disp: , Rfl:     ferrous sulfate 325 (65 Fe) mg tablet, Take 325 mg by mouth daily , Disp: , Rfl:     Magnesium 250 MG TABS, Take 250 mg by mouth daily , Disp: , Rfl:     Multiple Vitamins-Minerals (CENTRUM PO), Take 1 tablet by mouth daily , Disp: , Rfl:     omeprazole (PriLOSEC) 20 mg delayed release capsule, Take 1 capsule (20 mg total) by mouth daily, Disp: 30 capsule, Rfl: 3    oxyCODONE (ROXICODONE) 5 mg immediate release tablet, Take 1 tablet (5 mg total) by mouth every 4 (four) hours as needed for moderate painMax Daily Amount: 30 mg (Patient not taking: Reported on 7/24/2020), Disp: 10 tablet, Rfl: 0    polyethylene glycol (GLYCOLAX) 17 GM/SCOOP powder, Take 17 g by mouth daily, Disp: 507 g, Rfl: 1    sulfamethoxazole-trimethoprim (BACTRIM) 200-40 mg/5 mL suspension, Take 20 mL by mouth 2 (two) times a day for 7 days, Disp: 280 mL, Rfl: 0    vitamin B-12 (VITAMIN B-12) 1,000 mcg tablet, Take 1,000 mcg by mouth daily , Disp: , Rfl:     Wheat Dextrin (BENEFIBER) POWD, Stir 2 teaspoons of Benefiber into 4-8 oz of beverage or soft food two times daily (Patient not taking: Reported on 7/24/2020), Disp: 1 Can, Rfl: 1    Current Allergies     Allergies as of 08/01/2020 - Reviewed 08/01/2020   Allergen Reaction Noted    Penicillins Hives 12/03/2018    Cephalosporins  07/09/2020            The following portions of the patient's history were reviewed and updated as appropriate: allergies, current medications, past family history, past medical history, past social history, past surgical history and problem list      Past Medical History:   Diagnosis Date    Anemia     "mild"    Anxiety     occas    Colon polyp     GERD (gastroesophageal reflux disease)     Heart murmur     "with pregnancy than went away"    Hiatal hernia     History of kidney stones     History of transfusion     "years ago after a miscarriage"    Obesity     PCOS (polycystic ovarian syndrome)     Shortness of breath     occas    Wears glasses        Past Surgical History:   Procedure Laterality Date    COLONOSCOPY  11/2019    DILATION AND CURETTAGE OF UTERUS      D&E    EGD      CT LAP GASTRIC BYPASS/DAVID-EN-Y N/A 7/14/2020    Procedure: BYPASS GASTRIC DAVID-EN-Y LAPAROSCOPIC W ROBOTICS W/ INTRAOPERATIVE EGD;  Surgeon: Bertha Brenner MD;  Location: [facility];  Service: Bariatrics    UPPER GASTROINTESTINAL ENDOSCOPY  11/2019       Family History   Problem Relation Age of Onset    Ovarian cancer Mother     Hypertension Mother     Obesity Mother          Medications have been verified  Objective   BP (!) 181/77 (BP Location: Right arm, Patient Position: Sitting)   Pulse 87   Resp 16   Ht 5' 7"   Wt 110 kg (242 lb 3 2 oz)   SpO2 96%   BMI 37 93 kg/m²        Physical Exam     Physical Exam   Constitutional: She is oriented to person, place, and time  She appears well-developed  No distress  Pulmonary/Chest: Effort normal    Abdominal: Soft  Bowel sounds are normal  She exhibits no distension  A surgical scar is present  There is abdominal tenderness in the suprapubic area  There is no guarding  Neurological: She is alert and oriented to person, place, and time  Skin: Skin is warm and dry  She is not diaphoretic  Nursing note and vitals reviewed

## 2020-08-26 ENCOUNTER — CLINICAL SUPPORT (OUTPATIENT)
Dept: BARIATRICS | Facility: CLINIC | Age: 50
End: 2020-08-26

## 2020-08-26 VITALS — WEIGHT: 229.6 LBS | BODY MASS INDEX: 35.96 KG/M2

## 2020-08-26 DIAGNOSIS — Z98.84 BARIATRIC SURGERY STATUS: ICD-10-CM

## 2020-08-26 DIAGNOSIS — E66.01 MORBID (SEVERE) OBESITY DUE TO EXCESS CALORIES (HCC): Primary | ICD-10-CM

## 2020-08-26 PROCEDURE — RECHECK: Performed by: DIETITIAN, REGISTERED

## 2020-08-26 NOTE — PROGRESS NOTES
Weight Management Nutrition Class     Diagnosis: Morbid Obesity    Bariatric Surgeon: Dr Tal Be    Surgery: Gastric Bypass Laparoscopic    Class: 5 week post op     Topics discussed today include:     fluid goals post op, protein goals post op, constipation, chew food well, exercise, avoidance of alcohol, PPI use, diet progression, hypoglycemia, dumping syndrome, protein supplems, vitamin/mineral supplements, calcium supplements, additional vitamin B12 and iron supplements    Patient was able to verbalize basic diet (protein, fluid, vitamin and mineral) recommendations and possible nutrition-related complications   Yes

## 2020-08-31 ENCOUNTER — TELEPHONE (OUTPATIENT)
Dept: BARIATRICS | Facility: CLINIC | Age: 50
End: 2020-08-31

## 2020-08-31 NOTE — TELEPHONE ENCOUNTER
Patient s/p RNYGB with Dr Jay Cano 7/14/20  Called office today with c/o epigastric pain x4 days, feels like a "gargling" and pain  Also with mild nausea  Can happen with or without food  She is taking prilosec once daily and is on soft diet  She is moving her bowels  No vomiting or othersymtpoms reported  For now recommend she increase PPI to BID and go back to liquid/puree for the next few days  Recommend she schedule an appt with us for follow up for continued pain this week  For worsening/persistent pain or new symptoms instructed patient to go to ER

## 2020-09-01 ENCOUNTER — OFFICE VISIT (OUTPATIENT)
Dept: BARIATRICS | Facility: CLINIC | Age: 50
End: 2020-09-01

## 2020-09-01 VITALS
SYSTOLIC BLOOD PRESSURE: 116 MMHG | BODY MASS INDEX: 35.39 KG/M2 | WEIGHT: 225.5 LBS | DIASTOLIC BLOOD PRESSURE: 68 MMHG | HEIGHT: 67 IN | RESPIRATION RATE: 20 BRPM | TEMPERATURE: 97.3 F | HEART RATE: 63 BPM

## 2020-09-01 DIAGNOSIS — K91.2 POSTSURGICAL MALABSORPTION: ICD-10-CM

## 2020-09-01 DIAGNOSIS — R10.9 ABDOMINAL PAIN, UNSPECIFIED ABDOMINAL LOCATION: Primary | ICD-10-CM

## 2020-09-01 DIAGNOSIS — Z98.84 BARIATRIC SURGERY STATUS: ICD-10-CM

## 2020-09-01 PROCEDURE — 99024 POSTOP FOLLOW-UP VISIT: CPT | Performed by: PHYSICIAN ASSISTANT

## 2020-09-01 NOTE — PATIENT INSTRUCTIONS
Get CT scan and blood work done   Will call with results   Return to ED for worsening pain or development of new symptoms including inability to pass gas or move bowels, vomiting, fevers/chills, etc

## 2020-09-01 NOTE — PROGRESS NOTES
Assessment/Plan:       Diagnoses and all orders for this visit:    Abdominal pain, unspecified abdominal location  -     CT abdomen pelvis w contrast; Future  -     CBC and differential; Future  -     Comprehensive metabolic panel; Future    Bariatric surgery status  -     CBC and differential; Future  -     Comprehensive metabolic panel; Future    Postsurgical malabsorption      s/p RNYGB wit hiatal hernia repair with Dr Smallwood Signs 7/14/2020  Patient presents today to discuss abdominal pain x4-5 days  Patient notes pain location to be epigastric and lower abdominal  It is unrelated to food, is intermittent in nature and can be around 6 5-7/10 at worst  She is currently on soft diet and states she avoids any fatty or greasy foods and is measuring food portions out according to bariatric manual  She does have nausea but denies any vomiting, diarrhea, rectal bleeding, fevers/chills or other symptoms at this time  She is moving her bowels, last BM was yesterday and she takes Miraalax daily to maintain regularity  Of note, patient has history of similar sxs (gas/bloating, abdominal pain, nausea) last year and also + history of cholelithiasis  She was supposed to undergo cholecystectomy however this was deferred after US and HIDA scan were negative for cholecystitis  She was seeing Dr Filemon Dempsey who per his consult note suspected her sxs may be c/w IBS  Stool studies for infectious cause of diarrhea including C diff, stool enteric bacterial panel, ova and parasite and Giardia/Cryptosporidium tests were all negativeShe did have EGD and colonoscopy as well (refer below)  Her lipase was elevated at one point however per Dr Agustina Valenzuela note suspected may be related to gastritis/duodenitis found on her EGD  On exam today patient was tender in the epigastrium, LLQ up to LUQ  Abdomen was soft with no guarding and no hernias/masses appreciated       At this time, plan will be to repeat her CT abd w contrast  I have instructed patient to increase her PPI to BID for now, to continue with diet as tolerated and maintain adequate hydration to ensure regular BMs  Will check CBC and CMP  Pending CT results and given patient's clinical history, she may need repeat EGD for continued pain or follow up once again with Dr Nito Russo or general surgery  I will discuss plan with her surgeon Dr Charles Dunn once CT results  In the meantime, patient was instructed to return to ED for worsening pain or development of new sxs including inability to pass gas or move bowels, vomiting, fevers/chills, etc  She verbalizes understanding  Subjective:      Patient ID: Margarette Aragon is a 52 y o  female with history of RNYGB with hiatal hernia repair 7/14/2020 presenting for evaluation of abdominal pain  EGD 11/15/2019   showing moderate, generalized nodular mucosa with enlarged, abnormal folds in the stomach as well as a hiatal hernia and Schatzki's ring which was widely patent  There were also 2 submucosal appearing nodules in the 2nd part of the duodenum, biopsies showed nodular peptic duodenitis  Colonoscopy 11/15/2019  colonoscopy with random colon biopsies including from the terminal ileum being benign without evidence of microscopic colitis  There was a small area of focal acute colitis identified in the descending colon  She also had a polyp removed from the ascending colon which was a tubular adenoma so repeat was recommended in 3 years  She did have 2 other polyps removed, these were inflammatory polyps  Internal hemorrhoids and mild diverticulosis also seen  CT 11/1/2019  IMPRESSION:     1  No CT evidence of acute inflammatory process within the abdomen      2   Left kidney upper pole 2 mm nonobstructing calculus      3    Cholelithiasis     The following portions of the patient's history were reviewed and updated as appropriate: allergies, current medications, past family history, past medical history, past social history, past surgical history and problem list     Review of Systems   Constitutional: Negative  HENT: Negative  Respiratory: Negative  Cardiovascular: Negative  Gastrointestinal: Positive for abdominal pain and nausea  Negative for abdominal distention, blood in stool, constipation, diarrhea and vomiting  Objective:      /68   Pulse 63   Temp (!) 97 3 °F (36 3 °C)   Resp 20   Ht 5' 7" (1 702 m)   Wt 102 kg (225 lb 8 oz)   BMI 35 32 kg/m²          Physical Exam  Vitals signs and nursing note reviewed  Constitutional:       Appearance: Normal appearance  HENT:      Head: Normocephalic and atraumatic  Eyes:      Extraocular Movements: Extraocular movements intact  Abdominal:      General: There is no distension  Palpations: Abdomen is soft  There is no mass  Tenderness: There is abdominal tenderness in the epigastric area, suprapubic area, left upper quadrant and left lower quadrant  There is no guarding or rebound  Hernia: No hernia is present  Musculoskeletal: Normal range of motion  Skin:     General: Skin is warm and dry  Neurological:      General: No focal deficit present  Mental Status: She is alert and oriented to person, place, and time

## 2020-09-03 ENCOUNTER — TELEPHONE (OUTPATIENT)
Dept: GASTROENTEROLOGY | Facility: MEDICAL CENTER | Age: 50
End: 2020-09-03

## 2020-09-03 NOTE — LETTER
September 3, 2020    [facility]  [address] [place] [address]      Dear Ms De Dios:    Based on our records it appears that it is time for you to call and schedule your repeat EGD  We have been unable to reach you  Please call the [facility] office to schedule at [phone]    If you have any questions or concerns, please don't hesitate to call      Sincerely,      [facility]

## 2020-10-19 ENCOUNTER — OFFICE VISIT (OUTPATIENT)
Dept: BARIATRICS | Facility: CLINIC | Age: 50
End: 2020-10-19
Payer: COMMERCIAL

## 2020-10-19 VITALS
DIASTOLIC BLOOD PRESSURE: 60 MMHG | BODY MASS INDEX: 33.51 KG/M2 | HEIGHT: 67 IN | TEMPERATURE: 97 F | SYSTOLIC BLOOD PRESSURE: 108 MMHG | WEIGHT: 213.5 LBS | HEART RATE: 60 BPM

## 2020-10-19 DIAGNOSIS — Z98.84 BARIATRIC SURGERY STATUS: ICD-10-CM

## 2020-10-19 DIAGNOSIS — Z09 FOLLOW UP: Primary | ICD-10-CM

## 2020-10-19 DIAGNOSIS — K91.2 POSTSURGICAL MALABSORPTION: ICD-10-CM

## 2020-10-19 PROCEDURE — 99213 OFFICE O/P EST LOW 20 MIN: CPT | Performed by: PHYSICIAN ASSISTANT

## 2020-10-28 DIAGNOSIS — E66.01 OBESITY, CLASS III, BMI 40-49.9 (MORBID OBESITY) (HCC): ICD-10-CM

## 2020-10-29 RX ORDER — POLYETHYLENE GLYCOL 3350 17 G/17G
17 POWDER, FOR SOLUTION ORAL DAILY
Qty: 510 G | Refills: 1 | Status: SHIPPED | OUTPATIENT
Start: 2020-10-29 | End: 2020-11-02 | Stop reason: SDUPTHER

## 2020-11-02 DIAGNOSIS — E66.01 OBESITY, CLASS III, BMI 40-49.9 (MORBID OBESITY) (HCC): ICD-10-CM

## 2020-11-02 RX ORDER — POLYETHYLENE GLYCOL 3350 17 G/17G
17 POWDER, FOR SOLUTION ORAL DAILY
Qty: 510 G | Refills: 1 | Status: SHIPPED | OUTPATIENT
Start: 2020-11-02 | End: 2021-03-17 | Stop reason: SDUPTHER

## 2020-12-11 ENCOUNTER — NURSE TRIAGE (OUTPATIENT)
Dept: OTHER | Facility: OTHER | Age: 50
End: 2020-12-11

## 2020-12-11 DIAGNOSIS — B34.9 VIRAL SYNDROME: Primary | ICD-10-CM

## 2020-12-11 DIAGNOSIS — B34.9 VIRAL SYNDROME: ICD-10-CM

## 2020-12-11 PROCEDURE — U0003 INFECTIOUS AGENT DETECTION BY NUCLEIC ACID (DNA OR RNA); SEVERE ACUTE RESPIRATORY SYNDROME CORONAVIRUS 2 (SARS-COV-2) (CORONAVIRUS DISEASE [COVID-19]), AMPLIFIED PROBE TECHNIQUE, MAKING USE OF HIGH THROUGHPUT TECHNOLOGIES AS DESCRIBED BY CMS-2020-01-R: HCPCS | Performed by: FAMILY MEDICINE

## 2020-12-12 LAB — SARS-COV-2 RNA SPEC QL NAA+PROBE: DETECTED

## 2020-12-14 ENCOUNTER — TELEPHONE (OUTPATIENT)
Dept: OTHER | Facility: OTHER | Age: 50
End: 2020-12-14

## 2020-12-16 ENCOUNTER — TELEPHONE (OUTPATIENT)
Dept: OTHER | Facility: OTHER | Age: 50
End: 2020-12-16

## 2020-12-16 DIAGNOSIS — E66.9 OBESITY, UNSPECIFIED: ICD-10-CM

## 2020-12-16 RX ORDER — OMEPRAZOLE 20 MG/1
CAPSULE, DELAYED RELEASE ORAL
Qty: 30 CAPSULE | Refills: 3 | OUTPATIENT
Start: 2020-12-16

## 2020-12-16 NOTE — TELEPHONE ENCOUNTER
The patient was called for notification of a test result for COVID-19  The patient did not answer the phone and a voicemail was left requesting a call back to [phone], Option 7

## 2020-12-18 ENCOUNTER — HOSPITAL ENCOUNTER (EMERGENCY)
Facility: HOSPITAL | Age: 50
Discharge: HOME | End: 2020-12-18
Attending: EMERGENCY MEDICINE | Admitting: EMERGENCY MEDICINE
Payer: COMMERCIAL

## 2020-12-18 ENCOUNTER — NURSE TRIAGE (OUTPATIENT)
Dept: OTHER | Facility: OTHER | Age: 50
End: 2020-12-18

## 2020-12-18 ENCOUNTER — APPOINTMENT (EMERGENCY)
Dept: RADIOLOGY | Facility: HOSPITAL | Age: 50
End: 2020-12-18
Payer: COMMERCIAL

## 2020-12-18 VITALS
HEART RATE: 91 BPM | SYSTOLIC BLOOD PRESSURE: 117 MMHG | BODY MASS INDEX: 30.59 KG/M2 | WEIGHT: 195.33 LBS | TEMPERATURE: 97.8 F | OXYGEN SATURATION: 97 % | DIASTOLIC BLOOD PRESSURE: 58 MMHG | RESPIRATION RATE: 20 BRPM

## 2020-12-18 DIAGNOSIS — U07.1 COVID-19 VIRUS INFECTION: Primary | ICD-10-CM

## 2020-12-18 PROCEDURE — 99284 EMERGENCY DEPT VISIT MOD MDM: CPT

## 2020-12-18 PROCEDURE — 71045 X-RAY EXAM CHEST 1 VIEW: CPT

## 2020-12-18 PROCEDURE — 99282 EMERGENCY DEPT VISIT SF MDM: CPT | Performed by: PHYSICIAN ASSISTANT

## 2020-12-18 RX ORDER — MULTIVIT WITH MINERALS/LUTEIN
1000 TABLET ORAL 2 TIMES DAILY
Qty: 14 TABLET | Refills: 0 | Status: SHIPPED | OUTPATIENT
Start: 2020-12-18 | End: 2022-06-09 | Stop reason: ALTCHOICE

## 2020-12-28 DIAGNOSIS — E66.9 OBESITY, UNSPECIFIED: ICD-10-CM

## 2020-12-28 RX ORDER — OMEPRAZOLE 20 MG/1
20 CAPSULE, DELAYED RELEASE ORAL DAILY
Qty: 30 CAPSULE | Refills: 3 | Status: SHIPPED | OUTPATIENT
Start: 2020-12-28 | End: 2021-05-05

## 2020-12-29 DIAGNOSIS — Z98.84 BARIATRIC SURGERY STATUS: Primary | ICD-10-CM

## 2020-12-29 DIAGNOSIS — K91.2 POSTSURGICAL MALABSORPTION: ICD-10-CM

## 2020-12-29 RX ORDER — LANOLIN ALCOHOL/MO/W.PET/CERES
1000 CREAM (GRAM) TOPICAL DAILY
Qty: 30 TABLET | Refills: 3 | Status: SHIPPED | OUTPATIENT
Start: 2020-12-29

## 2021-01-03 NOTE — TELEPHONE ENCOUNTER
The patient was called for notification of a test result for COVID-19  The patient did not answer the phone and a voicemail was left requesting a call back to [phone], Option 7

## 2021-01-06 ENCOUNTER — OFFICE VISIT (OUTPATIENT)
Dept: PULMONOLOGY | Facility: CLINIC | Age: 51
End: 2021-01-06
Payer: COMMERCIAL

## 2021-01-06 VITALS
HEART RATE: 72 BPM | SYSTOLIC BLOOD PRESSURE: 100 MMHG | OXYGEN SATURATION: 99 % | DIASTOLIC BLOOD PRESSURE: 60 MMHG | TEMPERATURE: 97.1 F | BODY MASS INDEX: 29.57 KG/M2 | WEIGHT: 188.8 LBS | RESPIRATION RATE: 16 BRPM

## 2021-01-06 DIAGNOSIS — R09.82 POST-NASAL DRIP: ICD-10-CM

## 2021-01-06 DIAGNOSIS — U07.1 PNEUMONIA DUE TO COVID-19 VIRUS: Primary | ICD-10-CM

## 2021-01-06 DIAGNOSIS — G47.34 NOCTURNAL HYPOXIA: ICD-10-CM

## 2021-01-06 DIAGNOSIS — J12.82 PNEUMONIA DUE TO COVID-19 VIRUS: Primary | ICD-10-CM

## 2021-01-06 DIAGNOSIS — E66.3 OVERWEIGHT (BMI 25.0-29.9): ICD-10-CM

## 2021-01-06 PROCEDURE — 99244 OFF/OP CNSLTJ NEW/EST MOD 40: CPT | Performed by: INTERNAL MEDICINE

## 2021-01-06 PROCEDURE — 94618 PULMONARY STRESS TESTING: CPT | Performed by: INTERNAL MEDICINE

## 2021-01-06 RX ORDER — ALBUTEROL SULFATE 90 UG/1
AEROSOL, METERED RESPIRATORY (INHALATION)
COMMUNITY

## 2021-01-06 NOTE — ASSESSMENT & PLAN NOTE
- Slowly improving since infection  - Cont  Activity as able  - 6 MWT today shows normal oxygen with ambulation, no supplemental oxygen required  - Hold off on repeat CXR at this time  Schedule at next visit  - Cont   Mucinex for secretions if needed

## 2021-01-06 NOTE — ASSESSMENT & PLAN NOTE
May have underlying sleep apnea although does not have many other "classic" symptoms    - Attempted to order home sleep study but insurance denied     - Check in lab sleep study

## 2021-01-06 NOTE — PROGRESS NOTES
Pulmonary Consultation   Shruti Ann 52 y o  female MRN: [id number]  1/6/2021      Referring provider: Dr Keena Friend  Reason for consult: COVID positive    Assessment and Plan:  Pneumonia due to COVID-19 virus  - Slowly improving since infection  - Cont  Activity as able  - 6 MWT today shows normal oxygen with ambulation, no supplemental oxygen required  - Hold off on repeat CXR at this time  Schedule at next visit  - Cont  Mucinex for secretions if needed    Post-nasal drip  - Add Zyrtec for decongestant as needed  Nocturnal hypoxia  May have underlying sleep apnea although does not have many other "classic" symptoms    - Attempted to order home sleep study but insurance denied  - Check in lab sleep study    Overweight (BMI 25 0-29  9)  Congratulated on 75 pound weight loss since her bariatric surgery! Cont  Excellent efforts! Diagnoses and all orders for this visit:    Pneumonia due to COVID-19 virus  -     POCT 6 minute walk    Nocturnal hypoxia  -     Cancel: Home Study; Future  -     Diagnostic Sleep Study; Future    Post-nasal drip    Overweight (BMI 25 0-29  9)    Other orders  -     albuterol (PROVENTIL HFA,VENTOLIN HFA) 90 mcg/act inhaler; albuterol sulfate HFA 90 mcg/actuation aerosol inhaler   TAKE 2 PUFFS BY MOUTH EVERY 4 HOURS      Discussed at length  Concerns addressed  Follow up in one month  History of Present Illness   HPI:  Shruti Ann is a 52 y o  female who presents as new patient after having COVID  She felt ill with a cough and sneeze after Thanksgiving  She tested positive  She was hypoxic to the 80's and went to the ER on 12/18  She was discharged from ER  She felt well after a few days  However, she notices that her oxygen drops into 80's when she sleeps  She has been sleeping sitting up in the recliner  It does not drop if she sleeps when sitting up  She has an Albuterol that she uses as needed  She finds it helpful  She was told years ago that she was "asthmatic"   She uses an inhaler when she sick  Denies current breathlessness  Good appetite  Denies snoring, denies gasping for air  Denies daily headache  She had bariatric surgery July 14th and lost 75 pounds      Review of Systems  Positive as mentioned above and negative otherwise    Historical Information   Past Medical History:   Diagnosis Date    Anemia     "mild"    Anxiety     occas    Colon polyp     GERD (gastroesophageal reflux disease)     Heart murmur     "with pregnancy than went away"    Hiatal hernia     History of kidney stones     History of transfusion     "years ago after a miscarriage"    Obesity     PCOS (polycystic ovarian syndrome)     Shortness of breath     occas    Wears glasses      Past Surgical History:   Procedure Laterality Date    BARIATRIC SURGERY      COLONOSCOPY  11/2019    DILATION AND CURETTAGE OF UTERUS      D&E    EGD      [facility] GA[facility]DAVID-EN-Y N/A 7/14/2020    Procedure: BYPASS GASTRIC DAVID-EN-Y LAPAROSCOPIC W ROBOTICS W/ INTRAOPERATIVE EGD;  Surgeon: Lyubov Graves MD;  Location: [facility];  Service: Bariatrics    UPPER GASTROINTESTINAL ENDOSCOPY  11/2019     Family History   Problem Relation Age of Onset    Ovarian cancer Mother     Hypertension Mother     Obesity Mother        Occupational History: used to work in healthcare; currently in school for LPN    Social History: Quit smoking in 2017; smoked 1 ppd x 25 years  Pets: dog    Meds/Allergies     Current Outpatient Medications:     albuterol (PROVENTIL HFA,VENTOLIN HFA) 90 mcg/act inhaler, albuterol sulfate HFA 90 mcg/actuation aerosol inhaler  TAKE 2 PUFFS BY MOUTH EVERY 4 HOURS, Disp: , Rfl:     cetirizine (ZyrTEC) 10 mg tablet, Take 10 mg by mouth daily as needed , Disp: , Rfl:     Cholecalciferol (VITAMIN D3) 1 25 MG (45907 UT) CAPS, Take 50,000 Units by mouth once a week , Disp: , Rfl:     dextromethorphan-guaifenesin (MUCINEX DM)  MG per 12 hr tablet, Take 1 tablet by mouth every 12 (twelve) hours, Disp: 20 tablet, Rfl: 0    Multiple Vitamins-Minerals (CENTRUM PO), Take 1 tablet by mouth daily , Disp: , Rfl:     omeprazole (PriLOSEC) 20 mg delayed release capsule, Take 1 capsule (20 mg total) by mouth daily, Disp: 30 capsule, Rfl: 3    polyethylene glycol (GLYCOLAX) 17 GM/SCOOP powder, Take 17 g by mouth daily, Disp: 510 g, Rfl: 1    vitamin B-12 (VITAMIN B-12) 1,000 mcg tablet, Take 1 tablet (1,000 mcg total) by mouth daily, Disp: 30 tablet, Rfl: 3    Ascorbic Acid (vitamin C) 1000 MG tablet, Take 1 tablet (1,000 mg total) by mouth 2 (two) times a day for 7 days, Disp: 14 tablet, Rfl: 0    ferrous sulfate 325 (65 Fe) mg tablet, Take 325 mg by mouth daily , Disp: , Rfl:     Magnesium 250 MG TABS, Take 250 mg by mouth daily , Disp: , Rfl:     oxyCODONE (ROXICODONE) 5 mg immediate release tablet, Take 1 tablet (5 mg total) by mouth every 4 (four) hours as needed for moderate painMax Daily Amount: 30 mg (Patient not taking: Reported on 1/6/2021), Disp: 10 tablet, Rfl: 0    Wheat Dextrin (BENEFIBER) POWD, Stir 2 teaspoons of Benefiber into 4-8 oz of beverage or soft food two times daily (Patient not taking: Reported on 1/6/2021), Disp: 1 Can, Rfl: 1  Allergies   Allergen Reactions    Penicillins Hives    Cephalosporins      Avoids due to penicllin allergy       Vitals: Blood pressure 100/60, pulse 72, temperature (!) 97 1 °F (36 2 °C), resp  rate 16, weight 85 6 kg (188 lb 12 8 oz), SpO2 99 %, not currently breastfeeding , Body mass index is 29 57 kg/m²  Oxygen Therapy  SpO2: 99 %    Physical Exam  GEN: WDWN, nad, comfortable  HEENT: NCAT, EOMI  CVS: Regular, no m/r/g  LUNGS: CTA b/l  ABD: soft  EXT: No c/c/e  NEURO: No focal deficits  MS: Moving all extremities  SKIN: warm, dry  PSYCH: calm, cooperative      Labs: I have personally reviewed pertinent lab results    Lab Results   Component Value Date    WBC 10 04 07/15/2020    HGB 10 4 (L) 07/15/2020    HCT 32 4 (L) 07/15/2020 MCV 92 07/15/2020     07/15/2020     Lab Results   Component Value Date    CALCIUM 8 9 07/15/2020    K 4 1 07/15/2020    CO2 25 07/15/2020     07/15/2020    BUN 8 07/15/2020    CREATININE 0 67 07/15/2020     No results found for: IGE  Lab Results   Component Value Date    ALT 23 04/28/2020    AST 7 04/28/2020    ALKPHOS 66 04/28/2020     Labs from July 2020 per my interpretation show mild anemia, normal renal function    Imaging and other studies: I have personally reviewed pertinent films in PACS  CXR per my review shows bilateral GGO  EKG, Pathology, and Other Studies: I have personally reviewed pertinent reports  EKG 6/3/20: NSR    [facility]

## 2021-01-08 ENCOUNTER — LAB (OUTPATIENT)
Dept: LAB | Facility: MEDICAL CENTER | Age: 51
End: 2021-01-08
Payer: COMMERCIAL

## 2021-01-08 DIAGNOSIS — Z98.84 BARIATRIC SURGERY STATUS: ICD-10-CM

## 2021-01-08 DIAGNOSIS — K91.2 POSTSURGICAL MALABSORPTION: ICD-10-CM

## 2021-01-08 LAB
25(OH)D3 SERPL-MCNC: 99.1 NG/ML (ref 30–100)
FERRITIN SERPL-MCNC: 116 NG/ML (ref 8–388)
FOLATE SERPL-MCNC: >20 NG/ML (ref 3.1–17.5)
IRON SATN MFR SERPL: 10 %
IRON SERPL-MCNC: 32 UG/DL (ref 50–170)
PTH-INTACT SERPL-MCNC: 26.8 PG/ML (ref 18.4–80.1)
TIBC SERPL-MCNC: 307 UG/DL (ref 250–450)
VIT B12 SERPL-MCNC: 3549 PG/ML (ref 100–900)

## 2021-01-08 PROCEDURE — 84425 ASSAY OF VITAMIN B-1: CPT

## 2021-01-08 PROCEDURE — 84630 ASSAY OF ZINC: CPT

## 2021-01-08 PROCEDURE — 84590 ASSAY OF VITAMIN A: CPT

## 2021-01-08 PROCEDURE — 82746 ASSAY OF FOLIC ACID SERUM: CPT

## 2021-01-08 PROCEDURE — 82607 VITAMIN B-12: CPT

## 2021-01-08 PROCEDURE — 83970 ASSAY OF PARATHORMONE: CPT

## 2021-01-08 PROCEDURE — 36415 COLL VENOUS BLD VENIPUNCTURE: CPT

## 2021-01-08 PROCEDURE — 83550 IRON BINDING TEST: CPT

## 2021-01-08 PROCEDURE — 83540 ASSAY OF IRON: CPT

## 2021-01-08 PROCEDURE — 82728 ASSAY OF FERRITIN: CPT

## 2021-01-08 PROCEDURE — 82306 VITAMIN D 25 HYDROXY: CPT

## 2021-01-12 ENCOUNTER — TELEPHONE (OUTPATIENT)
Dept: PULMONOLOGY | Facility: CLINIC | Age: 51
End: 2021-01-12

## 2021-01-12 DIAGNOSIS — J12.82 PNEUMONIA DUE TO COVID-19 VIRUS: Primary | ICD-10-CM

## 2021-01-12 DIAGNOSIS — U07.1 PNEUMONIA DUE TO COVID-19 VIRUS: Primary | ICD-10-CM

## 2021-01-12 LAB — ZINC SERPL-MCNC: 115 UG/DL (ref 44–115)

## 2021-01-12 NOTE — TELEPHONE ENCOUNTER
Patient calling stating she had covid and pneumonia a few weeks ago and is feeling better from that but she is now having pain and tightness in the right side of her chest and it goes to her back when she breathes  She stated she did not have these symptoms at her visit on 1/6   Please advise

## 2021-01-12 NOTE — TELEPHONE ENCOUNTER
Spoke with Cheryl Blanchard  She said when she was in last week, she did not have this pain  She said it started a couple days ago  She said when she inhales she feels a pain on the right side of her chest in the front and it goes around to her back  When she takes a deep breath, she will cough  This pain is felt during normal breathing  Denies SOB and wheezing  She has not taken anything for the pain

## 2021-01-14 ENCOUNTER — LAB (OUTPATIENT)
Dept: LAB | Facility: MEDICAL CENTER | Age: 51
End: 2021-01-14
Payer: COMMERCIAL

## 2021-01-14 DIAGNOSIS — U07.1 PNEUMONIA DUE TO COVID-19 VIRUS: ICD-10-CM

## 2021-01-14 DIAGNOSIS — J12.82 PNEUMONIA DUE TO COVID-19 VIRUS: ICD-10-CM

## 2021-01-14 LAB
D DIMER PPP FEU-MCNC: <0.27 UG/ML FEU
VIT A SERPL-MCNC: 33.5 UG/DL (ref 20.1–62)

## 2021-01-14 PROCEDURE — 85379 FIBRIN DEGRADATION QUANT: CPT

## 2021-01-14 PROCEDURE — 36415 COLL VENOUS BLD VENIPUNCTURE: CPT

## 2021-01-15 LAB — VIT B1 BLD-SCNC: 106.6 NMOL/L (ref 66.5–200)

## 2021-01-15 NOTE — TELEPHONE ENCOUNTER
D-Dimer negative  Patient still having pain on her right side: chest, arm and back  She said her arm has pain when I spoke to her the other day but she forgot to tell me  The pain is the same  She is asking if she needs another CXR   Please advise

## 2021-01-16 ENCOUNTER — OFFICE VISIT (OUTPATIENT)
Dept: URGENT CARE | Facility: CLINIC | Age: 51
End: 2021-01-16
Payer: COMMERCIAL

## 2021-01-16 VITALS
HEIGHT: 67 IN | TEMPERATURE: 97.6 F | DIASTOLIC BLOOD PRESSURE: 60 MMHG | HEART RATE: 76 BPM | BODY MASS INDEX: 28.88 KG/M2 | SYSTOLIC BLOOD PRESSURE: 110 MMHG | RESPIRATION RATE: 18 BRPM | WEIGHT: 184 LBS | OXYGEN SATURATION: 98 %

## 2021-01-16 DIAGNOSIS — T14.8XXA ABRASION: Primary | ICD-10-CM

## 2021-01-16 PROCEDURE — 87070 CULTURE OTHR SPECIMN AEROBIC: CPT | Performed by: NURSE PRACTITIONER

## 2021-01-16 PROCEDURE — 99283 EMERGENCY DEPT VISIT LOW MDM: CPT | Performed by: NURSE PRACTITIONER

## 2021-01-16 PROCEDURE — 99203 OFFICE O/P NEW LOW 30 MIN: CPT | Performed by: NURSE PRACTITIONER

## 2021-01-16 PROCEDURE — G0382 LEV 3 HOSP TYPE B ED VISIT: HCPCS | Performed by: NURSE PRACTITIONER

## 2021-01-16 PROCEDURE — 87205 SMEAR GRAM STAIN: CPT | Performed by: NURSE PRACTITIONER

## 2021-01-16 NOTE — PATIENT INSTRUCTIONS
We saw you today for some skin breakdown to your buttocks  This is not an abscess or boil  Apply A&D ointment to the area a few times a day for the next week  You may bathe normally  I did send it for culture to err on the side of caution  If the culture grows any bacteria, I will call you and place you on an antibiotic at that time  The culture should be back in 48-72 hours  Follow-up with your family doctor for any other concerns

## 2021-01-16 NOTE — PROGRESS NOTES
Assessment/Plan    Abrasion [T14  8XXA]  1  Abrasion  Wound culture and Gram stain         Subjective:     Patient ID: Luisa Hemphill is a 52 y o  female  Reason For Visit / Chief Complaint  Chief Complaint   Patient presents with    Skin Lesion     Felt discomfort/irritation to inner buttocks crease x 1 week  c/o today area "burst" and expelled bloody discharge  Pain persists         HPI      Past Medical History:   Diagnosis Date    Anemia     "mild"    Anxiety     occas    Colon polyp     GERD (gastroesophageal reflux disease)     Heart murmur     "with pregnancy than went away"    Hiatal hernia     History of kidney stones     History of transfusion     "years ago after a miscarriage"    Obesity     PCOS (polycystic ovarian syndrome)     Shortness of breath     occas    Wears glasses        Past Surgical History:   Procedure Laterality Date    BARIATRIC SURGERY      COLONOSCOPY  11/2019    DILATION AND CURETTAGE OF UTERUS      D&E    EGD      [place] LAP GASTRIC BYPASS/DAVID-EN-Y N/A 7/14/2020    Procedure: BYPASS GASTRIC DAVID-EN-Y LAPAROSCOPIC W ROBOTICS W/ INTRAOPERATIVE EGD;  Surgeon: Charlene Merida MD;  Location: [facility];  Service: Bariatrics    UPPER GASTROINTESTINAL ENDOSCOPY  11/2019       Family History   Problem Relation Age of Onset    Ovarian cancer Mother     Hypertension Mother     Obesity Mother        Review of Systems   Constitutional: Negative for chills and fever  HENT: Negative for congestion and sore throat  Respiratory: Negative for cough and shortness of breath  Cardiovascular: Negative for chest pain  Gastrointestinal: Negative for abdominal pain, blood in stool, constipation, diarrhea, nausea and vomiting  Musculoskeletal: Negative for back pain and neck pain  Skin: Positive for wound  Neurological: Negative for headaches         Objective:    /60   Pulse 76   Temp 97 6 °F (36 4 °C)   Resp 18   Ht 5' 7 2" (1 707 m)   Wt 83 5 kg (184 lb) SpO2 98%   BMI 28 65 kg/m²     Physical Exam  Vitals signs and nursing note reviewed  Constitutional:       General: She is not in acute distress  Appearance: Normal appearance  She is normal weight  She is not ill-appearing  HENT:      Head: Normocephalic and atraumatic  Neck:      Musculoskeletal: Normal range of motion  Cardiovascular:      Rate and Rhythm: Normal rate and regular rhythm  Pulses: Normal pulses  Heart sounds: Normal heart sounds  No murmur  No friction rub  No gallop  Pulmonary:      Effort: Pulmonary effort is normal  No respiratory distress  Breath sounds: Normal breath sounds  No stridor  No wheezing, rhonchi or rales  Chest:      Chest wall: No tenderness  Musculoskeletal: Normal range of motion  Skin:     General: Skin is warm and dry  Capillary Refill: Capillary refill takes less than 2 seconds  Findings: Abrasion present  Neurological:      Mental Status: She is alert and oriented to person, place, and time     Psychiatric:         Mood and Affect: Mood normal

## 2021-01-18 ENCOUNTER — TELEPHONE (OUTPATIENT)
Dept: SLEEP CENTER | Facility: CLINIC | Age: 51
End: 2021-01-18

## 2021-01-18 NOTE — TELEPHONE ENCOUNTER
----- Message from Jami Gilliland DO sent at 1/18/2021 11:55 AM EST -----  Chart reviewed  Study approved  Dr Robert Ho to read   ----- Message -----  From: Nicolette Tomlinson MA  Sent: 1/14/2021   7:52 AM EST  To: Sleep Medicine [facility] Provider    This sleep study needs approval      If approved please sign and return to clerical pool  If denied please include reasons why  Also provide alternative testing if warranted  Please sign and return to clerical pool

## 2021-01-19 ENCOUNTER — OFFICE VISIT (OUTPATIENT)
Dept: BARIATRICS | Facility: CLINIC | Age: 51
End: 2021-01-19
Payer: COMMERCIAL

## 2021-01-19 VITALS
WEIGHT: 185.5 LBS | SYSTOLIC BLOOD PRESSURE: 90 MMHG | TEMPERATURE: 96.1 F | HEART RATE: 77 BPM | HEIGHT: 67 IN | DIASTOLIC BLOOD PRESSURE: 58 MMHG | BODY MASS INDEX: 29.11 KG/M2 | RESPIRATION RATE: 20 BRPM

## 2021-01-19 DIAGNOSIS — Z98.84 BARIATRIC SURGERY STATUS: ICD-10-CM

## 2021-01-19 DIAGNOSIS — Z48.815 ENCOUNTER FOR SURGICAL AFTERCARE FOLLOWING SURGERY OF DIGESTIVE SYSTEM: Primary | ICD-10-CM

## 2021-01-19 DIAGNOSIS — K91.2 POSTSURGICAL MALABSORPTION: ICD-10-CM

## 2021-01-19 LAB
BACTERIA WND AEROBE CULT: NO GROWTH
GRAM STN SPEC: NORMAL

## 2021-01-19 PROCEDURE — 99214 OFFICE O/P EST MOD 30 MIN: CPT | Performed by: PHYSICIAN ASSISTANT

## 2021-01-19 NOTE — PATIENT INSTRUCTIONS
· Follow-up in 6 months   We kindly ask that your arrive 15 minutes before your scheduled appointment time with your provider to allow our staff to room you, get your vital signs and update your chart  · Call our office if you have any problems with abdominal pain especially associated with fever, chills, nausea, vomiting or any other concerns  · All  Post-bariatric surgery patients should be aware that very small quantities of any alcohol can cause impairment and it is very possible not to feel the effect  The effect can be in the system for several hours  It is also a stomach irritant  · It is advised to AVOID alcohol, Nonsteroidal antiinflammatory drugs (NSAIDS) and nicotine of all forms   Any of these can cause stomach irritation/pain  · Discussed the effects of alcohol on a bariatric patient and the increased impairment risk  · Keep up the good work!

## 2021-01-19 NOTE — PROGRESS NOTES
Assessment/Plan:     Patient ID: Laura Fleming is a 52 y o  female  Bariatric Surgery Status    s/p RNYGB wit hiatal hernia repair with Dr Judy Molina 7/14/2020 Presents to the office today for 3rd postop  · Continued/Maintain healthy weight loss with good nutrition intakes  · Adequate hydration with at least 64oz  fluid intake  · Follow diet as discussed  · Follow vitamin and mineral recommendations as reviewed with you  · Exercise as tolerated  · Colonoscopy referral made: UTD last in 2019 q3 years     · Follow-up in 6 months   We kindly ask that your arrive 15 minutes before your scheduled appointment time with your provider to allow our staff to room you, get your vital signs and update your chart  · Call our office if you have any problems with abdominal pain especially associated with fever, chills, nausea, vomiting or any other concerns  · All  Post-bariatric surgery patients should be aware that very small quantities of any alcohol can cause impairment and it is very possible not to feel the effect  The effect can be in the system for several hours  It is also a stomach irritant  · It is advised to AVOID alcohol, Nonsteroidal antiinflammatory drugs (NSAIDS) and nicotine of all forms   Any of these can cause stomach irritation/pain  · Discussed the effects of alcohol on a bariatric patient and the increased impairment risk  · Keep up the good work!      Postsurgical Malabsorption   -At risk for malabsorption of vitamins/minerals secondary to malabsorption and restriction of intake from bariatric surgery  -Currently taking adequate postop bariatric surgery vitamin supplementation  -Last set of bariatric labs completed on 1/2021 and showed WNL  -Next set of bariatric labs ordered for approximately 6 months  -Patient received education about the importance of adhering to a lifelong supplementation regimen to avoid vitamin/mineral deficiencies      Diagnoses and all orders for this visit:    Encounter for surgical aftercare following surgery of digestive system    Bariatric surgery status    Postsurgical malabsorption         Subjective:      Patient ID: Cayden Hodge is a 52 y o  female  s/p RNYGB wit hiatal hernia repair with Dr Anil Tobin 7/14/2020 Presents to the office today for 3rd postop  Tolerating diet without issues; denies N/V, dysphagia, reflux  Overall doing Well  Initial:270  Current:185 5  EWL: (Weight loss is ahead of schedule at this post surgical period )  Riky: current   Current BMI is Body mass index is 28 88 kg/m²  · Tolerating a regular diet-yes  · Eating at least 60 grams of protein per day-yes  · Following 30/60 minute rule with liquids-yes  · Drinking at least 64 ounces of fluid per day-yes  · Drinking carbonated beverages-no  · Sufficient exercise-gym and at home workouts   · Using NSAIDs regularly-no  · Using nicotine-no  · Using alcohol-no  · Supplements: bariatric mvi + calcium  · Taking daily PPI     EWL is 76%, which places the patient ahead of schedule for expected post surgical weight loss at this time  The following portions of the patient's history were reviewed and updated as appropriate: allergies, current medications, past family history, past medical history, past social history, past surgical history and problem list     Review of Systems   Constitutional: Negative  Respiratory: Negative  Cardiovascular: Negative  Gastrointestinal: Negative  Musculoskeletal: Negative  Skin: Negative  Neurological: Negative  Psychiatric/Behavioral: Negative  Objective:    BP 90/58 (BP Location: Right arm, Patient Position: Sitting, Cuff Size: Standard)   Pulse 77   Temp (!) 96 1 °F (35 6 °C) (Tympanic)   Resp 20   Ht 5' 7 2" (1 707 m)   Wt 84 1 kg (185 lb 8 oz)   BMI 28 88 kg/m²      Physical Exam  Vitals signs and nursing note reviewed  Constitutional:       Appearance: Normal appearance  She is obese     HENT:      Head: Normocephalic and atraumatic  Eyes:      Extraocular Movements: Extraocular movements intact  Pupils: Pupils are equal, round, and reactive to light  Neck:      Musculoskeletal: Normal range of motion  Cardiovascular:      Rate and Rhythm: Normal rate and regular rhythm  Pulmonary:      Effort: Pulmonary effort is normal       Breath sounds: Normal breath sounds  Abdominal:      General: Bowel sounds are normal       Palpations: Abdomen is soft  Musculoskeletal: Normal range of motion  Skin:     General: Skin is warm and dry  Neurological:      General: No focal deficit present  Mental Status: She is alert and oriented to person, place, and time     Psychiatric:         Mood and Affect: Mood normal          Behavior: Behavior normal

## 2021-01-25 DIAGNOSIS — Z23 ENCOUNTER FOR IMMUNIZATION: ICD-10-CM

## 2021-03-12 ENCOUNTER — ANNUAL EXAM (OUTPATIENT)
Dept: OBGYN CLINIC | Facility: CLINIC | Age: 51
End: 2021-03-12
Payer: COMMERCIAL

## 2021-03-12 VITALS
HEART RATE: 83 BPM | DIASTOLIC BLOOD PRESSURE: 60 MMHG | HEIGHT: 67 IN | TEMPERATURE: 97.3 F | BODY MASS INDEX: 27.97 KG/M2 | WEIGHT: 178.2 LBS | SYSTOLIC BLOOD PRESSURE: 118 MMHG

## 2021-03-12 DIAGNOSIS — Z12.4 CERVICAL CANCER SCREENING: ICD-10-CM

## 2021-03-12 DIAGNOSIS — N95.2 VAGINAL ATROPHY: ICD-10-CM

## 2021-03-12 DIAGNOSIS — Z80.41 FAMILY HISTORY OF OVARIAN CANCER: ICD-10-CM

## 2021-03-12 DIAGNOSIS — Z01.419 ENCOUNTER FOR ANNUAL ROUTINE GYNECOLOGICAL EXAMINATION: Primary | ICD-10-CM

## 2021-03-12 DIAGNOSIS — L73.9 FOLLICULITIS: ICD-10-CM

## 2021-03-12 DIAGNOSIS — Z12.31 ENCOUNTER FOR SCREENING MAMMOGRAM FOR BREAST CANCER: ICD-10-CM

## 2021-03-12 PROCEDURE — 99396 PREV VISIT EST AGE 40-64: CPT | Performed by: CLINICAL NURSE SPECIALIST

## 2021-03-12 PROCEDURE — 0503F POSTPARTUM CARE VISIT: CPT | Performed by: CLINICAL NURSE SPECIALIST

## 2021-03-12 PROCEDURE — 87624 HPV HI-RISK TYP POOLED RSLT: CPT | Performed by: CLINICAL NURSE SPECIALIST

## 2021-03-12 PROCEDURE — G0145 SCR C/V CYTO,THINLAYER,RESCR: HCPCS | Performed by: CLINICAL NURSE SPECIALIST

## 2021-03-12 RX ORDER — ERGOCALCIFEROL 1.25 MG/1
CAPSULE ORAL
COMMUNITY
Start: 2021-02-18 | End: 2022-06-09 | Stop reason: SDUPTHER

## 2021-03-12 RX ORDER — FLUTICASONE PROPIONATE 50 MCG
SPRAY, SUSPENSION (ML) NASAL
COMMUNITY
Start: 2020-12-30

## 2021-03-12 RX ORDER — ESTRADIOL 0.1 MG/G
1 CREAM VAGINAL 2 TIMES WEEKLY
Qty: 42.5 G | Refills: 3 | Status: SHIPPED | OUTPATIENT
Start: 2021-03-15

## 2021-03-12 RX ORDER — LEVOFLOXACIN 500 MG/1
TABLET, FILM COATED ORAL
COMMUNITY
Start: 2020-12-31 | End: 2022-06-09 | Stop reason: ALTCHOICE

## 2021-03-12 RX ORDER — CLOTRIMAZOLE AND BETAMETHASONE DIPROPIONATE 10; .64 MG/G; MG/G
CREAM TOPICAL
COMMUNITY
End: 2022-06-09 | Stop reason: ALTCHOICE

## 2021-03-12 NOTE — PROGRESS NOTES
Assessment/Plan:           Ely Chiu- Primary  Annual GYN examination completed today  Risk prevention and anticipatory guidance provided including:   Pap/breast screenings- see below   Risk for hereditary cancers: Family history reviewed and patient does qualify for referral for genetics consult/testing  Referral to genetics oncology offered as indicated   Calcium and vitamin D supplementation   Risk factors for lipid, diabetes and thryroid screening, ordered if needed   Dietary and lifestyle recommendations based on her age and weight  body mass index is 27 74 kg/m²  Lety Flannery PHQ-2 depression screen completed  Reviewed and interventions recommended if needed   Tobacco and alcohol use, intervention ordered if applicable  Cervical cancer screening  Previous pap smears and ASCCP screening guidelines have been reviewed  Pap smear is indicated at this time  Contraception   N/A- s/p permanent sterilization surgery Essure devide    STI Screening  STI screening is declined  STI prevention discussed with use of condoms  Breast exam and breast cancer screening  She is overdue for screening    Problem List Items Addressed This Visit     Family history of ovarian cancer     Family history reveals significance for ovarian cancer- had  Some type of testing 5-6 years ago but denies ever going to genetic counselor  Results not in chart  Reviewed that even though she had testing previously every 5-6 years is recommended to revisit with a genetic counselor to determine if there is any new testing available as we learn more all the time new information regarding genetics and cancer  Referral entered                   Relevant Orders    Ambulatory Referral to Oncology Genetics    Folliculitis      This is resolving and nearly healed and not a treatment at present advised to continue doing hot compresses 3 to 4 times a day to facilitate complete healing         Relevant Medications clotrimazole-betamethasone (LOTRISONE) 1-0 05 % cream    Vaginal atrophy      Patient perimenopausal and complaining of vaginal dryness; exam consistent with mild vaginal atrophy  Reviewed options for treatment including daily vaginal moisturizers/coconut oil as an alternative  Also recommended changing type of lubricant to silicone based instead of water-based  Also reviewed vaginal estrogen preparations which can be used 2 to 3 times a week and patient is very interested in this  Prescription for Estrace given         Relevant Medications    estradiol (ESTRACE) 0 1 mg/g vaginal cream (Start on 3/15/2021)    Cervical cancer screening    Relevant Orders    Liquid-based pap, screening      Other Visit Diagnoses     Encounter for annual routine gynecological examination    -  Primary    Encounter for screening mammogram for breast cancer        Relevant Orders    Mammo screening bilateral w 3d & cad          Orders Placed This Encounter   Procedures    Mammo screening bilateral w 3d & cad    Ambulatory Referral to Oncology Genetics           Subjective:        Paola Mckeon is a 52 y o  female  Here for Gynecologic Exam (Patient here for yv, C/O of possible ingrown hair  Last Pap- 08/28/2017 D-, Mammo- 07/30/2018 Negative)      GYN HPI  [id number]  For the past 1 5 wks she has noted a bump on labia that is mildly painful, was bigger, but recently noted smaller  Denies drainage from area  Menstrual patttern: perimenopausal- last menses in September  not since  So going on 7 months- aware 12 consecutive months w/o menses = menopause  Denies menopausal sxs  She denies /GI and Gyn complaints including: abnormal vaginal discharge, Slight irritation as above and denies urinary sxs  Denies changes or concerns r/t breasts  She does performs self breast exams  She reports she eats a healthy diet and exercises regularly  She does get dietary calcium/vit D  She is sexually active   Her current method of contraception includes essure , She complains of issues with intimacy  Is noting increased dryness that is affecting  She reports that she doesfeel safe at home  Screening for Depression/anxiety completed via PHQ and she denies depression/anxiety  The following portions of the patient's history were reviewed and updated as appropriate: allergies, current medications, past family history, past medical history, past social history, past surgical history, and problem list       Hereditary Cancer Screening  Family history of uterine or ovarian cancer: mother with ovarian cancer  Family history of breast cancer: denies  Family history of colon cancer: denies  Family history reviewed and patient does meet criteria for referral for genetic cancer  Pt reports she was tested about 5 years ago  assessment  Family History   Problem Relation Age of Onset    Ovarian cancer Mother    Julieann Frankel Hypertension Mother     Obesity Mother     No Known Problems Father     No Known Problems Daughter     No Known Problems Son     Diabetes Maternal Grandmother     No Known Problems Half-Sister     No Known Problems Half-Sister     No Known Problems Son     No Known Problems Daughter        Substance Abuse Screening Completed  See hx and flowsheet  Screens Positive for none               SCREENINGS:    History of abnormal pap: No, Last Papanicolaou test:  03/12/2021  History of abnormal mammogram: No, Last mammogram:  Not on file  She is up to date on Lipid, DM and thyroid screening  Additional DM/CV risk factors:   Hx of pre-eclampsia (Del'd < 37 wks or >2 pregnancies): No   Hx of GDM during pregnancy: No   First degree family history: No   Sudden death < 50: No   Diabetes: No   Early MI >50: No    IMMUNIZATIONS  Tetanus vaccination status reviewed: tetanus status unknown to the patient  Review of Systems   Constitutional: Negative for appetite change, chills, fatigue, fever and unexpected weight change     HENT: Negative  Eyes: Negative  Respiratory: Negative for chest tightness and shortness of breath  Cardiovascular: Negative for chest pain and palpitations  Gastrointestinal: Negative for abdominal pain, constipation and vomiting  Endocrine: Negative for cold intolerance and heat intolerance  Genitourinary:        As per HPI   Musculoskeletal: Negative for back pain, joint swelling and neck pain  Skin: Negative for color change and rash  Neurological: Negative for dizziness, weakness and numbness  Hematological: Does not bruise/bleed easily  Psychiatric/Behavioral: Negative  Objective:  /60 (BP Location: Right arm, Patient Position: Sitting, Cuff Size: Standard)   Pulse 83   Temp (!) 97 3 °F (36 3 °C) (Temporal)   Ht 5' 7 2" (1 707 m)   Wt 80 8 kg (178 lb 3 2 oz)   LMP  (LMP Unknown)   BMI 27 74 kg/m²        Physical Exam  Constitutional:       General: She is not in acute distress  Appearance: Normal appearance  Genitourinary:      Pelvic exam was performed with patient in the lithotomy position  Vulva, urethra and rectum normal       No vulval lesion, tenderness or rash noted  No posterior fourchette injury or lesion present  No urethral prolapse or caruncle present  No lesions in the vagina  No vaginal discharge, erythema, tenderness, bleeding or prolapse  No cervical motion tenderness, discharge, friability or erythema  Uterus is not enlarged or tender  Uterus is regular  No right or left adnexal mass present  Right adnexa not tender  Left adnexa not tender  Genitourinary Comments: Mild vaginal atrophy     HENT:      Head: Normocephalic and atraumatic  Neck:      Musculoskeletal: Normal range of motion  Cardiovascular:      Rate and Rhythm: Normal rate  Heart sounds: No murmur  Pulmonary:      Effort: Pulmonary effort is normal       Breath sounds: Normal breath sounds     Chest: Breasts: Breasts are symmetrical          Right: No inverted nipple, mass, nipple discharge, skin change or tenderness  Left: No inverted nipple, mass, nipple discharge, skin change or tenderness  Abdominal:      General: There is no distension  Palpations: Abdomen is soft  Tenderness: There is no abdominal tenderness  Musculoskeletal: Normal range of motion  Lymphadenopathy:      Cervical: No cervical adenopathy  Neurological:      Mental Status: She is alert and oriented to person, place, and time  Skin:     General: Skin is warm and dry  Psychiatric:         Mood and Affect: Mood normal          Behavior: Behavior normal    Vitals signs reviewed

## 2021-03-12 NOTE — ASSESSMENT & PLAN NOTE
This is resolving and nearly healed and not a treatment at present advised to continue doing hot compresses 3 to 4 times a day to facilitate complete healing

## 2021-03-12 NOTE — PATIENT INSTRUCTIONS
I prescribed Estrace vaginal estrogen to be used in the vagina twice weekly to help with dryness  If medication prescribed is too costly - you may be able to save yourself money   check out www [employer]   Go to the  website to see if they offer coupons to help with cost   If neither of the above are useful, please let me know- I will try to find an alternative  Consider using over-the-counter vaginal moisturizers to help with vaginal dryness  To most commonly used brands are Replens and repHresh- Cost effective alternatives: coconut oil, or olive oil  Other tips-Supportive Vaginal Health    Try a probiotic with lactobacillus in it to help keep good bacteria in the vagina in order to keep the vagina at a healthy pH to keep bad bacteria from growing  Additionally you could add in a prebiotic like Luvena to help support lactobacillus  Hershal Bad comes in both a vaginal moisturizer wash and prebiotic suppositories to support good vaginal health  Lubrication can be used during sex for increased comfort  They come in both water and oil based  Try the water based lubricant initially but feel free to use oil based if water based is ineffective

## 2021-03-12 NOTE — ASSESSMENT & PLAN NOTE
Patient perimenopausal and complaining of vaginal dryness; exam consistent with mild vaginal atrophy  Reviewed options for treatment including daily vaginal moisturizers/coconut oil as an alternative  Also recommended changing type of lubricant to silicone based instead of water-based  Also reviewed vaginal estrogen preparations which can be used 2 to 3 times a week and patient is very interested in this    Prescription for Estrace given

## 2021-03-12 NOTE — ASSESSMENT & PLAN NOTE
Family history reveals significance for ovarian cancer- had  Some type of testing 5-6 years ago but denies ever going to genetic counselor  Results not in chart  Reviewed that even though she had testing previously every 5-6 years is recommended to revisit with a genetic counselor to determine if there is any new testing available as we learn more all the time new information regarding genetics and cancer  Referral entered

## 2021-03-15 LAB
HPV HR 12 DNA CVX QL NAA+PROBE: NEGATIVE
HPV16 DNA CVX QL NAA+PROBE: NEGATIVE
HPV18 DNA CVX QL NAA+PROBE: NEGATIVE

## 2021-03-17 ENCOUNTER — TELEPHONE (OUTPATIENT)
Dept: BARIATRICS | Facility: CLINIC | Age: 51
End: 2021-03-17

## 2021-03-17 DIAGNOSIS — K59.00 CONSTIPATION: Primary | ICD-10-CM

## 2021-03-17 DIAGNOSIS — E66.01 OBESITY, CLASS III, BMI 40-49.9 (MORBID OBESITY) (HCC): ICD-10-CM

## 2021-03-17 LAB
LAB AP GYN PRIMARY INTERPRETATION: NORMAL
Lab: NORMAL

## 2021-03-17 RX ORDER — POLYETHYLENE GLYCOL 3350 17 G/17G
17 POWDER, FOR SOLUTION ORAL DAILY
Qty: 510 G | Refills: 1 | Status: SHIPPED | OUTPATIENT
Start: 2021-03-17

## 2021-03-26 ENCOUNTER — TELEPHONE (OUTPATIENT)
Dept: SURGICAL ONCOLOGY | Facility: CLINIC | Age: 51
End: 2021-03-26

## 2021-03-26 NOTE — TELEPHONE ENCOUNTER
Genetics New Patient Intake Form     Patient Details:     Luisa Hemphill     1970     [id number]     Background Information:           Who is calling to schedule?                                            na   If not self, relationship to patient? na   Referring Provider na   Is the referral marked STAT NA   Is patient newly diagnosed with cancer, have metastatic disease, or pending surgery? NA   If yes, which is it?  na   If the patient is pending surgery Needs to be scheduled within 48 hours   If none available, schedule patient then email [facility] cancer genetics   If the patient is metastatic or newly diagnosed Needs to be scheduled within 2 weeks   If none available, schedule patient then email [facility] cancer genetics   Have you had genetic testing that showed a positive genetic mutation? (If yes, schedule within 2 weeks or email STAT cancer genetics) Did Not Speak to Patient   Has your family member had genetic testing that resulted in a positive genetic mutation? (If yes in the last 6 months, schedule within 3 weeks )  (If yes but over 6 months, schedule as usual) Did Not Speak to Patient   Is this a personal or family history?  family   What is the type of tumor? Did Not Speak to Patient   Scheduling Information:   Preferred [facility]:  Any   Are there any dates/time the patient cannot be seen? Did Not Speak to Patient   Did the patient schedule an appointment? Did Not Speak to Patient   If yes, list appointment date and provider name na   If no, briefly state why na   Miscellaneous Pt was contacted 3x via referral  Referral closed     After completing the above information, please route to Oncology Genetics

## 2021-04-19 ENCOUNTER — TELEPHONE (OUTPATIENT)
Dept: BARIATRICS | Facility: CLINIC | Age: 51
End: 2021-04-19

## 2021-04-19 DIAGNOSIS — K91.2 POSTSURGICAL MALABSORPTION: ICD-10-CM

## 2021-04-19 DIAGNOSIS — Z98.84 BARIATRIC SURGERY STATUS: Primary | ICD-10-CM

## 2021-04-19 DIAGNOSIS — R23.8 EASY BRUISING: ICD-10-CM

## 2021-04-19 NOTE — TELEPHONE ENCOUNTER
Pt  Called with c/o random bruising on her body  She denies bumping or injury to these areas  Pt  Says she can push on an area and it will bruise  Pt  Last had blood work done in [place] and her iron was slightly low at that point  Pt  Is not scheduled for annual until July

## 2021-04-22 ENCOUNTER — IMMUNIZATIONS (OUTPATIENT)
Dept: FAMILY MEDICINE CLINIC | Facility: HOSPITAL | Age: 51
End: 2021-04-22

## 2021-04-22 DIAGNOSIS — Z23 ENCOUNTER FOR IMMUNIZATION: Primary | ICD-10-CM

## 2021-04-22 PROCEDURE — 91301 SARS-COV-2 / COVID-19 MRNA VACCINE (MODERNA) 100 MCG: CPT

## 2021-04-22 PROCEDURE — 0011A SARS-COV-2 / COVID-19 MRNA VACCINE (MODERNA) 100 MCG: CPT

## 2021-04-27 ENCOUNTER — LAB (OUTPATIENT)
Dept: LAB | Facility: HOSPITAL | Age: 51
End: 2021-04-27
Payer: COMMERCIAL

## 2021-04-27 DIAGNOSIS — Z98.84 BARIATRIC SURGERY STATUS: ICD-10-CM

## 2021-04-27 DIAGNOSIS — R23.8 EASY BRUISING: ICD-10-CM

## 2021-04-27 DIAGNOSIS — K91.2 POSTSURGICAL MALABSORPTION: ICD-10-CM

## 2021-04-27 LAB
25(OH)D3 SERPL-MCNC: 79 NG/ML (ref 30–100)
ALBUMIN SERPL BCP-MCNC: 3.1 G/DL (ref 3.5–5)
ALP SERPL-CCNC: 75 U/L (ref 46–116)
ALT SERPL W P-5'-P-CCNC: 33 U/L (ref 12–78)
ANION GAP SERPL CALCULATED.3IONS-SCNC: 4 MMOL/L (ref 4–13)
AST SERPL W P-5'-P-CCNC: 21 U/L (ref 5–45)
BILIRUB SERPL-MCNC: 0.27 MG/DL (ref 0.2–1)
BUN SERPL-MCNC: 12 MG/DL (ref 5–25)
CALCIUM ALBUM COR SERPL-MCNC: 9.7 MG/DL (ref 8.3–10.1)
CALCIUM SERPL-MCNC: 9 MG/DL (ref 8.3–10.1)
CHLORIDE SERPL-SCNC: 105 MMOL/L (ref 100–108)
CO2 SERPL-SCNC: 31 MMOL/L (ref 21–32)
CREAT SERPL-MCNC: 0.66 MG/DL (ref 0.6–1.3)
ERYTHROCYTE [DISTWIDTH] IN BLOOD BY AUTOMATED COUNT: 13.5 % (ref 11.6–15.1)
FERRITIN SERPL-MCNC: 60 NG/ML (ref 8–388)
FOLATE SERPL-MCNC: >20 NG/ML (ref 3.1–17.5)
GFR SERPL CREATININE-BSD FRML MDRD: 120 ML/MIN/1.73SQ M
GLUCOSE SERPL-MCNC: 84 MG/DL (ref 65–140)
HCT VFR BLD AUTO: 34.9 % (ref 34.8–46.1)
HGB BLD-MCNC: 11 G/DL (ref 11.5–15.4)
IRON SATN MFR SERPL: 28 %
IRON SERPL-MCNC: 79 UG/DL (ref 50–170)
MCH RBC QN AUTO: 29 PG (ref 26.8–34.3)
MCHC RBC AUTO-ENTMCNC: 31.5 G/DL (ref 31.4–37.4)
MCV RBC AUTO: 92 FL (ref 82–98)
PLATELET # BLD AUTO: 339 THOUSANDS/UL (ref 149–390)
PMV BLD AUTO: 9.4 FL (ref 8.9–12.7)
POTASSIUM SERPL-SCNC: 4.2 MMOL/L (ref 3.5–5.3)
PROT SERPL-MCNC: 7.6 G/DL (ref 6.4–8.2)
PTH-INTACT SERPL-MCNC: 29.3 PG/ML (ref 18.4–80.1)
RBC # BLD AUTO: 3.79 MILLION/UL (ref 3.81–5.12)
SODIUM SERPL-SCNC: 140 MMOL/L (ref 136–145)
TIBC SERPL-MCNC: 286 UG/DL (ref 250–450)
VIT B12 SERPL-MCNC: 3213 PG/ML (ref 100–900)
WBC # BLD AUTO: 5.55 THOUSAND/UL (ref 4.31–10.16)

## 2021-04-27 PROCEDURE — 82306 VITAMIN D 25 HYDROXY: CPT

## 2021-04-27 PROCEDURE — 36415 COLL VENOUS BLD VENIPUNCTURE: CPT

## 2021-04-27 PROCEDURE — 84590 ASSAY OF VITAMIN A: CPT

## 2021-04-27 PROCEDURE — 80053 COMPREHEN METABOLIC PANEL: CPT

## 2021-04-27 PROCEDURE — 84425 ASSAY OF VITAMIN B-1: CPT

## 2021-04-27 PROCEDURE — 85027 COMPLETE CBC AUTOMATED: CPT

## 2021-04-27 PROCEDURE — 83970 ASSAY OF PARATHORMONE: CPT

## 2021-04-27 PROCEDURE — 82728 ASSAY OF FERRITIN: CPT

## 2021-04-27 PROCEDURE — 83550 IRON BINDING TEST: CPT

## 2021-04-27 PROCEDURE — 82746 ASSAY OF FOLIC ACID SERUM: CPT

## 2021-04-27 PROCEDURE — 84630 ASSAY OF ZINC: CPT

## 2021-04-27 PROCEDURE — 83540 ASSAY OF IRON: CPT

## 2021-04-27 PROCEDURE — 82607 VITAMIN B-12: CPT

## 2021-04-30 ENCOUNTER — TELEPHONE (OUTPATIENT)
Dept: BARIATRICS | Facility: CLINIC | Age: 51
End: 2021-04-30

## 2021-04-30 DIAGNOSIS — Z98.84 BARIATRIC SURGERY STATUS: Primary | ICD-10-CM

## 2021-04-30 DIAGNOSIS — D64.9 LOW HEMOGLOBIN: ICD-10-CM

## 2021-04-30 DIAGNOSIS — T14.8XXA BRUISING: ICD-10-CM

## 2021-04-30 LAB — ZINC SERPL-MCNC: 75 UG/DL (ref 44–115)

## 2021-04-30 NOTE — TELEPHONE ENCOUNTER
Her labs have not all resulted yet  Please let her know I reviewed what has returned over [address]  Her iron studies are normal but hemoglobin mildly low, if still having bruising issue can consider referral to hematology if she is agreeable   Thank you

## 2021-05-02 LAB
VIT A SERPL-MCNC: 14.3 UG/DL (ref 20.1–62)
VIT B1 BLD-SCNC: 116.3 NMOL/L (ref 66.5–200)

## 2021-05-03 ENCOUNTER — TELEPHONE (OUTPATIENT)
Dept: BARIATRICS | Facility: CLINIC | Age: 51
End: 2021-05-03

## 2021-05-03 ENCOUNTER — TELEPHONE (OUTPATIENT)
Dept: HEMATOLOGY ONCOLOGY | Facility: CLINIC | Age: 51
End: 2021-05-03

## 2021-05-03 DIAGNOSIS — R79.89 LOW SERUM VITAMIN A: ICD-10-CM

## 2021-05-03 DIAGNOSIS — Z98.84 BARIATRIC SURGERY STATUS: Primary | ICD-10-CM

## 2021-05-03 DIAGNOSIS — K91.2 POSTSURGICAL MALABSORPTION: ICD-10-CM

## 2021-05-03 NOTE — TELEPHONE ENCOUNTER
Pt  Called requesting an rx be sent in for the vitamin A that you recommended she take  I did tell pt  That this is an OTC medication  However she stated that insurance would pay if it was sent in and a rx

## 2021-05-03 NOTE — TELEPHONE ENCOUNTER
New Patient Encounter    New Patient Intake Form   Patient Details:  Wyatt Jones  1970  [id number]    Background Information:  [address] starts by opening a telephone encounter and gathering the following information   Who is calling to schedule? If not self, relationship to patient? self   Referring Provider  Landry Moore   What is the diagnosis? Low hemoglobin / easy bruising   Is this diagnosis confirmed? Yes   When was the diagnosis? 4/2021   Is there a confirmed diagnosis from a biopsy/tissue reviewed by pathology? na   Were outside slides requested? No   Is patient aware of diagnosis? Yes   Is there a personal history and what kind? No   Is there a family history and what kind? No   Reason for visit? New Diagnosis   Have you had any imaging or labs done? If so: when, where? yes  [facility]   Are records in [employer]? yes   If patient has a prior history of breast cancer were old records obtained? NA   Was the patient told to bring a disk? No   Does the patient smoke or Vape? No   If yes, how many packs or cartridges per day? Scheduling Information:   Preferred [facility]:  [facility]     Are there any dates/time the patient cannot be seen? Miscellaneous:    After completing the above information, please route to Financial Counselor and the appropriate Nurse Navigator for review

## 2021-05-04 DIAGNOSIS — Z98.84 BARIATRIC SURGERY STATUS: Primary | ICD-10-CM

## 2021-05-04 DIAGNOSIS — K91.2 POSTSURGICAL MALABSORPTION: ICD-10-CM

## 2021-05-04 DIAGNOSIS — R79.89 LOW SERUM VITAMIN A: ICD-10-CM

## 2021-05-04 NOTE — TELEPHONE ENCOUNTER
Please let patient know I sent in the vitamin A but most likely will not be covered by her insurance since it is OTC thank you

## 2021-05-05 DIAGNOSIS — E66.9 OBESITY, UNSPECIFIED: ICD-10-CM

## 2021-05-05 RX ORDER — OMEPRAZOLE 20 MG/1
CAPSULE, DELAYED RELEASE ORAL
Qty: 30 CAPSULE | Refills: 3 | Status: SHIPPED | OUTPATIENT
Start: 2021-05-05 | End: 2021-10-19 | Stop reason: SDUPTHER

## 2021-05-10 ENCOUNTER — CONSULT (OUTPATIENT)
Dept: HEMATOLOGY ONCOLOGY | Facility: CLINIC | Age: 51
End: 2021-05-10
Payer: COMMERCIAL

## 2021-05-10 VITALS
RESPIRATION RATE: 18 BRPM | BODY MASS INDEX: 27.78 KG/M2 | TEMPERATURE: 96.6 F | DIASTOLIC BLOOD PRESSURE: 64 MMHG | WEIGHT: 177 LBS | SYSTOLIC BLOOD PRESSURE: 126 MMHG | HEART RATE: 66 BPM | HEIGHT: 67 IN | OXYGEN SATURATION: 98 %

## 2021-05-10 DIAGNOSIS — D64.9 LOW HEMOGLOBIN: ICD-10-CM

## 2021-05-10 DIAGNOSIS — Z98.84 BARIATRIC SURGERY STATUS: Primary | ICD-10-CM

## 2021-05-10 DIAGNOSIS — Z80.41 FAMILY HISTORY OF OVARIAN CANCER: ICD-10-CM

## 2021-05-10 DIAGNOSIS — T14.8XXA BRUISING: ICD-10-CM

## 2021-05-10 DIAGNOSIS — D50.9 IRON DEFICIENCY ANEMIA, UNSPECIFIED IRON DEFICIENCY ANEMIA TYPE: ICD-10-CM

## 2021-05-10 PROCEDURE — 3008F BODY MASS INDEX DOCD: CPT | Performed by: INTERNAL MEDICINE

## 2021-05-10 PROCEDURE — 99244 OFF/OP CNSLTJ NEW/EST MOD 40: CPT | Performed by: INTERNAL MEDICINE

## 2021-05-10 PROCEDURE — 1036F TOBACCO NON-USER: CPT | Performed by: INTERNAL MEDICINE

## 2021-05-10 RX ORDER — SODIUM CHLORIDE 9 MG/ML
20 INJECTION, SOLUTION INTRAVENOUS ONCE
Status: CANCELLED | OUTPATIENT
Start: 2021-05-14

## 2021-05-10 NOTE — PROGRESS NOTES
Hematology / Oncology Outpatient Consult Note    Julius Koch 54 y o  female [id number]         Date:  5/10/2021    Assessment / Plan:    A 61-year-old perimenopause woman who has history of morbid obesity  She underwent gastric bypass surgery in July 2020, resulting in INDER  She has mild normocytic normochromic anemia  Hematology workup showed normal W73 and folic acid level  Ferritin is still normal range but it has been declining  In addition, she has recent easy bruising with no history of postoperative bleeding  The etiology of her mild anemia is not totally clear  She may have developing iron deficiency  Since her ferritin is decreasing, I recommended her to try venofer 200 mg x 3 doses to see if her anemia improved  I will see her again in 4 months with CBC, ferritin  When she does blood work, I will let her do von Willebrand activity, PT INR, APTT for evaluation for easy bruising  She is in agreement with my recommendations  Subjective:     HPI:    A 61-year-old perimenopause woman who has history of morbid obesity  She underwent gastric bypass surgery in July 2020, resulting in 100 lb of weight loss  She is still actively gradually losing her weight  In the last 12 months, she had 1-2 times of menstrual bleeding  She has some hot flashes as well as cold sensation  She was referred to me by Bariatric Department to evaluate her mild anemia as well as bruising  She has very  Occasional nose bleed  She has some bruising which started to occur in the last 2-3 years  When she had regular menstrual cycle, she had relatively heavy menstrual bleeding  She has no history of possible to have abnormal bleeding  Her recent hemoglobin was 11  Ferritin was 60  C27 and folic acid were within normal limits  She feels well  She has mild fatigue  She denied any exertional shortness of breath  She has no prior history of surgery except bariatric surgery    She had colonoscopy as well as as EGD in November 2019 which was benign  She was a smoker until 4 years ago  She does not drink alcohol  Her performance status is normal         Interval History:          Objective:     Primary Diagnosis:      Mild anemia  Status post gastric bypass surgery  Easy bruising  Cancer Staging:  Cancer Staging  No matching staging information was found for the patient  Previous Hematologic/ Oncologic Treatment:         Current Hematologic/ Oncologic Treatment:        Venofer 200 mg x 3 doses  Disease Status:     NA    Test Results:    Pathology:        Radiology:        Laboratory:      See below for CBC and ferritin  I have reviewed her last 2 years of CBC  S59 and folic acid are within normal limits  CMP are completely within normal limits  Physical Exam:      General Appearance:    Alert, oriented        Eyes:    PERRL   Ears:    Normal external ear canals, both ears   Nose:   Nares normal, septum midline   Throat:   Mucosa moist  Pharynx without injection  Neck:   Supple       Lungs:     Clear to auscultation bilaterally   Chest Wall:    No tenderness or deformity    Heart:    Regular rate and rhythm       Abdomen:     Soft, non-tender, bowel sounds +, no organomegaly           Extremities:   Extremities no cyanosis or edema       Skin:   no rash or icterus  Lymph nodes:   Cervical, supraclavicular, and axillary nodes normal   Neurologic:   CNII-XII intact, normal strength, sensation and reflexes     Throughout          Breast exam:   NA         ROS: Review of Systems   All other systems reviewed and are negative  Imaging: No results found        Labs:   Lab Results   Component Value Date    WBC 5 55 04/27/2021    HGB 11 0 (L) 04/27/2021    HCT 34 9 04/27/2021    MCV 92 04/27/2021     04/27/2021     Lab Results   Component Value Date    K 4 2 04/27/2021     04/27/2021    CO2 31 04/27/2021    BUN 12 04/27/2021    CREATININE 0 66 04/27/2021    GLUF 95 04/28/2020    CALCIUM 9 0 04/27/2021    CORRECTEDCA 9 7 04/27/2021    AST 21 04/27/2021    ALT 33 04/27/2021    ALKPHOS 75 04/27/2021    EGFR 120 04/27/2021         Lab Results   Component Value Date    IRON 79 04/27/2021    TIBC 286 04/27/2021    FERRITIN 60 04/27/2021       Lab Results   Component Value Date    OFVXMEVJ17 3,213 (H) 04/27/2021       Lab Results   Component Value Date    FOLATE >20 0 (H) 04/27/2021           Vital Sign:    Body surface area is 1 92 meters squared  Wt Readings from Last 3 Encounters:   05/10/21 80 3 kg (177 lb)   03/12/21 80 8 kg (178 lb 3 2 oz)   01/19/21 84 1 kg (185 lb 8 oz)        Temp Readings from Last 3 Encounters:   05/10/21 (!) 96 6 °F (35 9 °C) (Tympanic Core)   03/12/21 (!) 97 3 °F (36 3 °C) (Temporal)   01/19/21 (!) 96 1 °F (35 6 °C) (Tympanic)        BP Readings from Last 3 Encounters:   05/10/21 126/64   03/12/21 118/60   01/19/21 90/58         Pulse Readings from Last 3 Encounters:   05/10/21 66   03/12/21 83   01/19/21 77     @LASTSAO2(3)@    Active Problems:   Patient Active Problem List   Diagnosis    Left sided abdominal pain    Gastritis without bleeding    Change in bowel habit    Change in stool    Nausea    Calculus of gallbladder without cholecystitis without obstruction    Elevated lipase    Preoperative cardiovascular examination    Pneumonia due to COVID-19 virus    Post-nasal drip    Nocturnal hypoxia    Overweight (BMI 25 0-29  9)    Family history of ovarian cancer    Folliculitis    Vaginal atrophy    Cervical cancer screening    Low hemoglobin    Bariatric surgery status    Bruising    Iron deficiency anemia, unspecified       Past Medical History:   Past Medical History:   Diagnosis Date    Anemia     "mild"    Anxiety     occas    Colon polyp     GERD (gastroesophageal reflux disease)     Heart murmur     "with pregnancy than went away"    Hiatal hernia     History of kidney stones     History of transfusion     "years ago after a miscarriage"    Obesity     PCOS (polycystic ovarian syndrome)     Shortness of breath     occas    Wears glasses        Surgical History:   Past Surgical History:   Procedure Laterality Date    BARIATRIC SURGERY      COLONOSCOPY  11/2019    DILATION AND CURETTAGE OF UTERUS      D&E    EGD      ESSURE TUBAL LIGATION      [place] LAP GASTRIC BYPASS/DAVID-EN-Y N/A 7/14/2020    Procedure: BYPASS GASTRIC DAVID-EN-Y LAPAROSCOPIC W ROBOTICS W/ INTRAOPERATIVE EGD;  Surgeon: Soo Valero MD;  Location: [facility];  Service: Bariatrics    UPPER GASTROINTESTINAL ENDOSCOPY  11/2019       Family History:    Family History   Problem Relation Age of Onset    Ovarian cancer Mother    Kamran Lemme Hypertension Mother     Obesity Mother     No Known Problems Father     No Known Problems Daughter     No Known Problems Son     Diabetes Maternal Grandmother     No Known Problems Half-Sister     No Known Problems Half-Sister     No Known Problems Son     No Known Problems Daughter        Cancer-related family history includes Ovarian cancer in her mother      Social History:   Social History     Socioeconomic History    Marital status: /Civil Union     Spouse name: Not on file    Number of children: Not on file    Years of education: Not on file    Highest education level: Not on file   Occupational History    Not on file   Social Needs    Financial resource strain: Not on file    Food insecurity     Worry: Not on file     Inability: Not on file   [employer] needs     Medical: Not on file     Non-medical: Not on file   Tobacco Use    Smoking status: Former Smoker    Smokeless tobacco: Never Used   Substance and Sexual Activity    Alcohol use: No    Drug use: No    Sexual activity: Yes     Partners: Male     Birth control/protection: Female Sterilization     Comment: Rashida 2010   Lifestyle    Physical activity     Days per week: Not on file     Minutes per session: Not on file    Stress: Not on file   Relationships    Social connections     Talks on phone: Not on file     Gets together: Not on file     Attends Moravian service: Not on file     Active member of club or organization: Not on file     Attends meetings of clubs or organizations: Not on file     Relationship status: Not on file    Intimate partner violence     Fear of current or ex partner: Not on file     Emotionally abused: Not on file     Physically abused: Not on file     Forced sexual activity: Not on file   Other Topics Concern    Not on file   Social History Narrative    Not on file       Current Medications:   Current Outpatient Medications   Medication Sig Dispense Refill    albuterol (PROVENTIL HFA,VENTOLIN HFA) 90 mcg/act inhaler albuterol sulfate HFA 90 mcg/actuation aerosol inhaler   TAKE 2 PUFFS BY MOUTH EVERY 4 HOURS      cetirizine (ZyrTEC) 10 mg tablet Take 10 mg by mouth daily as needed       Cholecalciferol (VITAMIN D3) 1 25 MG (96519 UT) CAPS Take 50,000 Units by mouth once a week       clotrimazole-betamethasone (LOTRISONE) 1-0 05 % cream clotrimazole-betamethasone 1 %-0 05 % topical cream   PLEASE SEE ATTACHED FOR DETAILED DIRECTIONS      dextromethorphan-guaifenesin (MUCINEX DM)  MG per 12 hr tablet Take 1 tablet by mouth every 12 (twelve) hours 20 tablet 0    ergocalciferol (VITAMIN D2) 50,000 units TAKE 1 CAPSULE BY MOUTH ONE TIME PER WEEK      estradiol (ESTRACE) 0 1 mg/g vaginal cream Insert 1 g into the vagina 2 (two) times a week 42 5 g 3    ferrous sulfate 325 (65 Fe) mg tablet Take 325 mg by mouth daily       fluticasone (FLONASE) 50 mcg/act nasal spray SPRAY 1 SPRAY INTO EACH NOSTRIL EVERY DAY      levofloxacin (LEVAQUIN) 500 mg tablet TAKE 1 TABLET BY MOUTH EVERY DAY FOR 10 DAYS      Magnesium 250 MG TABS Take 250 mg by mouth daily       Multiple Vitamins-Minerals (CENTRUM PO) Take 1 tablet by mouth daily       omeprazole (PriLOSEC) 20 mg delayed release capsule TAKE 1 CAPSULE BY MOUTH EVERY DAY 30 capsule 3    polyethylene glycol (GLYCOLAX) 17 GM/SCOOP powder Take 17 g by mouth daily 510 g 1    vitamin A 3 MG (05737 UT) capsule Take 1 capsule (10,000 Units total) by mouth daily 90 capsule 0    vitamin B-12 (VITAMIN B-12) 1,000 mcg tablet Take 1 tablet (1,000 mcg total) by mouth daily 30 tablet 3    Ascorbic Acid (vitamin C) 1000 MG tablet Take 1 tablet (1,000 mg total) by mouth 2 (two) times a day for 7 days 14 tablet 0     No current facility-administered medications for this visit  Allergies:    Allergies   Allergen Reactions    Penicillins Hives    Cephalosporins      Avoids due to penicllin allergy

## 2021-05-10 NOTE — LETTER
May 10, 2021     Arabella Chen DO  [address] [place] [address]    Patient: Margarette Aragon   YOB: 1970   Date of Visit: 5/10/2021       Dear Dr Sara Gomez: Thank you for referring [facility] to me for evaluation  Below are my notes for this consultation  If you have questions, please do not hesitate to call me  I look forward to following your patient along with you  Sincerely,        Olinda Olivares MD        CC: LANETTE Thompson MD  5/10/2021  2:43 PM  Sign when Signing Visit  Hematology / Oncology Outpatient Consult Note    [facility] 54 y o  female [id number]         Date:  5/10/2021    Assessment / Plan:    A 44-year-old perimenopause woman who has history of morbid obesity  She underwent gastric bypass surgery in July 2020, resulting in INDER  She has mild normocytic normochromic anemia  Hematology workup showed normal C57 and folic acid level  Ferritin is still normal range but it has been declining  In addition, she has recent easy bruising with no history of postoperative bleeding  The etiology of her mild anemia is not totally clear  She may have developing iron deficiency  Since her ferritin is decreasing, I recommended her to try venofer 200 mg x 3 doses to see if her anemia improved  I will see her again in 4 months with CBC, ferritin  When she does blood work, I will let her do von Willebrand activity, PT INR, APTT for evaluation for easy bruising  She is in agreement with my recommendations  Subjective:     HPI:    A 44-year-old perimenopause woman who has history of morbid obesity  She underwent gastric bypass surgery in July 2020, resulting in 100 lb of weight loss  She is still actively gradually losing her weight  In the last 12 months, she had 1-2 times of menstrual bleeding  She has some hot flashes as well as cold sensation    She was referred to me by Bariatric Department to evaluate her mild anemia as well as bruising  She has very  Occasional nose bleed  She has some bruising which started to occur in the last 2-3 years  When she had regular menstrual cycle, she had relatively heavy menstrual bleeding  She has no history of possible to have abnormal bleeding  Her recent hemoglobin was 11  Ferritin was 60  G05 and folic acid were within normal limits  She feels well  She has mild fatigue  She denied any exertional shortness of breath  She has no prior history of surgery except bariatric surgery  She had colonoscopy as well as as EGD in November 2019 which was benign  She was a smoker until 4 years ago  She does not drink alcohol  Her performance status is normal         Interval History:          Objective:     Primary Diagnosis:      Mild anemia  Status post gastric bypass surgery  Easy bruising  Cancer Staging:  Cancer Staging  No matching staging information was found for the patient  Previous Hematologic/ Oncologic Treatment:         Current Hematologic/ Oncologic Treatment:        Venofer 200 mg x 3 doses  Disease Status:     NA    Test Results:    Pathology:        Radiology:        Laboratory:      See below for CBC and ferritin  I have reviewed her last 2 years of CBC  M22 and folic acid are within normal limits  CMP are completely within normal limits  Physical Exam:      General Appearance:    Alert, oriented        Eyes:    PERRL   Ears:    Normal external ear canals, both ears   Nose:   Nares normal, septum midline   Throat:   Mucosa moist  Pharynx without injection  Neck:   Supple       Lungs:     Clear to auscultation bilaterally   Chest Wall:    No tenderness or deformity    Heart:    Regular rate and rhythm       Abdomen:     Soft, non-tender, bowel sounds +, no organomegaly           Extremities:   Extremities no cyanosis or edema       Skin:   no rash or icterus      Lymph nodes:   Cervical, supraclavicular, and axillary nodes normal Neurologic:   CNII-XII intact, normal strength, sensation and reflexes     Throughout          Breast exam:   NA         ROS: Review of Systems   All other systems reviewed and are negative  Imaging: No results found  Labs:   Lab Results   Component Value Date    WBC 5 55 04/27/2021    HGB 11 0 (L) 04/27/2021    HCT 34 9 04/27/2021    MCV 92 04/27/2021     04/27/2021     Lab Results   Component Value Date    K 4 2 04/27/2021     04/27/2021    CO2 31 04/27/2021    BUN 12 04/27/2021    CREATININE 0 66 04/27/2021    GLUF 95 04/28/2020    CALCIUM 9 0 04/27/2021    CORRECTEDCA 9 7 04/27/2021    AST 21 04/27/2021    ALT 33 04/27/2021    ALKPHOS 75 04/27/2021    EGFR 120 04/27/2021         Lab Results   Component Value Date    IRON 79 04/27/2021    TIBC 286 04/27/2021    FERRITIN 60 04/27/2021       Lab Results   Component Value Date    [id number] 3,213 (H) 04/27/2021       Lab Results   Component Value Date    FOLATE >20 0 (H) 04/27/2021           Vital Sign:    Body surface area is 1 92 meters squared      Wt Readings from Last 3 Encounters:   05/10/21 80 3 kg (177 lb)   03/12/21 80 8 kg (178 lb 3 2 oz)   01/19/21 84 1 kg (185 lb 8 oz)        Temp Readings from Last 3 Encounters:   05/10/21 (!) 96 6 °F (35 9 °C) (Tympanic Core)   03/12/21 (!) 97 3 °F (36 3 °C) (Temporal)   01/19/21 (!) 96 1 °F (35 6 °C) (Tympanic)        BP Readings from Last 3 Encounters:   05/10/21 126/64   03/12/21 118/60   01/19/21 90/58         Pulse Readings from Last 3 Encounters:   05/10/21 66   03/12/21 83   01/19/21 77     @LASTSAO2(3)@    Active Problems:   Patient Active Problem List   Diagnosis    Left sided abdominal pain    Gastritis without bleeding    Change in bowel habit    Change in stool    Nausea    Calculus of gallbladder without cholecystitis without obstruction    Elevated lipase    Preoperative cardiovascular examination    Pneumonia due to COVID-19 virus    Post-nasal drip    Nocturnal hypoxia    Overweight (BMI 25 0-29  9)    Family history of ovarian cancer    Folliculitis    Vaginal atrophy    Cervical cancer screening    Low hemoglobin    Bariatric surgery status    Bruising    Iron deficiency anemia, unspecified       Past Medical History:   Past Medical History:   Diagnosis Date    Anemia     "mild"    Anxiety     occas    Colon polyp     GERD (gastroesophageal reflux disease)     Heart murmur     "with pregnancy than went away"    Hiatal hernia     History of kidney stones     History of transfusion     "years ago after a miscarriage"    Obesity     PCOS (polycystic ovarian syndrome)     Shortness of breath     occas    Wears glasses        Surgical History:   Past Surgical History:   Procedure Laterality Date    BARIATRIC SURGERY      COLONOSCOPY  11/2019    DILATION AND CURETTAGE OF UTERUS      D&E    EGD      ESSURE TUBAL LIGATION      [place] LAP GASTRIC BYPASS/DAVID-EN-Y N/A 7/14/2020    Procedure: BYPASS GASTRIC DAVID-EN-Y LAPAROSCOPIC W ROBOTICS W/ INTRAOPERATIVE EGD;  Surgeon: 87Corrie Plummer MD;  Location: [facility] OR;  Service: Bariatrics    UPPER GASTROINTESTINAL ENDOSCOPY  11/2019       Family History:    Family History   Problem Relation Age of Onset    Ovarian cancer Mother     Hypertension Mother     Obesity Mother     No Known Problems Father     No Known Problems Daughter     No Known Problems Son     Diabetes Maternal Grandmother     No Known Problems Half-Sister     No Known Problems Half-Sister     No Known Problems Son     No Known Problems Daughter        Cancer-related family history includes Ovarian cancer in her mother      Social History:   Social History     Socioeconomic History    Marital status: /Civil Union     Spouse name: Not on file    Number of children: Not on file    Years of education: Not on file    Highest education level: Not on file   Occupational History    Not on file   Social Needs    Financial resource strain: Not on file    Food insecurity     Worry: Not on file     Inability: Not on file    Transportation needs     Medical: Not on file     Non-medical: Not on file   Tobacco Use    Smoking status: Former Smoker    Smokeless tobacco: Never Used   Substance and Sexual Activity    Alcohol use: No    Drug use: No    Sexual activity: Yes     Partners: Male     Birth control/protection: Female Sterilization     Comment: Rashida 2010   Lifestyle    Physical activity     Days per week: Not on file     Minutes per session: Not on file    Stress: Not on file   Relationships    Social connections     Talks on phone: Not on file     Gets together: Not on file     Attends Yazidism service: Not on file     Active member of club or organization: Not on file     Attends meetings of clubs or organizations: Not on file     Relationship status: Not on file    Intimate partner violence     Fear of current or ex partner: Not on file     Emotionally abused: Not on file     Physically abused: Not on file     Forced sexual activity: Not on file   Other Topics Concern    Not on file   Social History Narrative    Not on file       Current Medications:   Current Outpatient Medications   Medication Sig Dispense Refill    albuterol (PROVENTIL HFA,VENTOLIN HFA) 90 mcg/act inhaler albuterol sulfate HFA 90 mcg/actuation aerosol inhaler   TAKE 2 PUFFS BY MOUTH EVERY 4 HOURS      cetirizine (ZyrTEC) 10 mg tablet Take 10 mg by mouth daily as needed       Cholecalciferol (VITAMIN D3) 1 25 MG (25025 UT) CAPS Take 50,000 Units by mouth once a week       clotrimazole-betamethasone (LOTRISONE) 1-0 05 % cream clotrimazole-betamethasone 1 %-0 05 % topical cream   PLEASE SEE ATTACHED FOR DETAILED DIRECTIONS      dextromethorphan-guaifenesin (MUCINEX DM)  MG per 12 hr tablet Take 1 tablet by mouth every 12 (twelve) hours 20 tablet 0    ergocalciferol (VITAMIN D2) 50,000 units TAKE 1 CAPSULE BY MOUTH ONE TIME PER WEEK      estradiol (ESTRACE) 0 1 mg/g vaginal cream Insert 1 g into the vagina 2 (two) times a week 42 5 g 3    ferrous sulfate 325 (65 Fe) mg tablet Take 325 mg by mouth daily       fluticasone (FLONASE) 50 mcg/act nasal spray SPRAY 1 SPRAY INTO EACH NOSTRIL EVERY DAY      levofloxacin (LEVAQUIN) 500 mg tablet TAKE 1 TABLET BY MOUTH EVERY DAY FOR 10 DAYS      Magnesium 250 MG TABS Take 250 mg by mouth daily       Multiple Vitamins-Minerals (CENTRUM PO) Take 1 tablet by mouth daily       omeprazole (PriLOSEC) 20 mg delayed release capsule TAKE 1 CAPSULE BY MOUTH EVERY DAY 30 capsule 3    polyethylene glycol (GLYCOLAX) 17 GM/SCOOP powder Take 17 g by mouth daily 510 g 1    vitamin A 3 MG (25994 UT) capsule Take 1 capsule (10,000 Units total) by mouth daily 90 capsule 0    vitamin B-12 (VITAMIN B-12) 1,000 mcg tablet Take 1 tablet (1,000 mcg total) by mouth daily 30 tablet 3    Ascorbic Acid (vitamin C) 1000 MG tablet Take 1 tablet (1,000 mg total) by mouth 2 (two) times a day for 7 days 14 tablet 0     No current facility-administered medications for this visit  Allergies:    Allergies   Allergen Reactions    Penicillins Hives    Cephalosporins      Avoids due to penicllin allergy

## 2021-05-14 ENCOUNTER — HOSPITAL ENCOUNTER (OUTPATIENT)
Dept: INFUSION CENTER | Facility: CLINIC | Age: 51
Discharge: HOME | End: 2021-05-14
Payer: COMMERCIAL

## 2021-05-14 VITALS
TEMPERATURE: 97.4 F | DIASTOLIC BLOOD PRESSURE: 62 MMHG | SYSTOLIC BLOOD PRESSURE: 96 MMHG | HEART RATE: 67 BPM | RESPIRATION RATE: 18 BRPM

## 2021-05-14 DIAGNOSIS — D50.9 IRON DEFICIENCY ANEMIA, UNSPECIFIED IRON DEFICIENCY ANEMIA TYPE: Primary | ICD-10-CM

## 2021-05-14 PROCEDURE — 96374 THER/PROPH/DIAG INJ IV PUSH: CPT

## 2021-05-14 RX ORDER — SODIUM CHLORIDE 9 MG/ML
20 INJECTION, SOLUTION INTRAVENOUS ONCE
Status: COMPLETED | OUTPATIENT
Start: 2021-05-14 | End: 2021-05-14

## 2021-05-14 RX ORDER — SODIUM CHLORIDE 9 MG/ML
20 INJECTION, SOLUTION INTRAVENOUS ONCE
Status: CANCELLED | OUTPATIENT
Start: 2021-05-28

## 2021-05-14 RX ADMIN — SODIUM CHLORIDE 20 ML/HR: 0.9 INJECTION, SOLUTION INTRAVENOUS at 14:49

## 2021-05-14 NOTE — PROGRESS NOTES
Pt arrived to unit without complaint  Pt tolerated Venofer push without incident  AVS provided  Pt left unit in stable condition

## 2021-05-14 NOTE — PLAN OF CARE
Problem: Potential for Falls  Goal: Patient will remain free of falls  Description: INTERVENTIONS:  - Assess patient frequently for physical needs  -  Identify cognitive and physical deficits and behaviors that affect risk of falls    -  [place] fall precautions as indicated by assessment   - Educate patient/family on patient safety including physical limitations  - Instruct patient to call for assistance with activity based on assessment  - Modify environment to reduce risk of injury  - Consider OT/PT consult to assist with strengthening/mobility  Outcome: Progressing

## 2021-05-26 ENCOUNTER — IMMUNIZATIONS (OUTPATIENT)
Dept: FAMILY MEDICINE CLINIC | Facility: HOSPITAL | Age: 51
End: 2021-05-26

## 2021-05-26 DIAGNOSIS — Z23 ENCOUNTER FOR IMMUNIZATION: Primary | ICD-10-CM

## 2021-05-26 PROCEDURE — 0012A SARS-COV-2 / COVID-19 MRNA VACCINE (MODERNA) 100 MCG: CPT

## 2021-05-26 PROCEDURE — 91301 SARS-COV-2 / COVID-19 MRNA VACCINE (MODERNA) 100 MCG: CPT

## 2021-05-28 ENCOUNTER — HOSPITAL ENCOUNTER (OUTPATIENT)
Dept: INFUSION CENTER | Facility: CLINIC | Age: 51
End: 2021-05-28

## 2021-06-11 ENCOUNTER — HOSPITAL ENCOUNTER (OUTPATIENT)
Dept: INFUSION CENTER | Facility: CLINIC | Age: 51
Discharge: HOME | End: 2021-06-11
Payer: COMMERCIAL

## 2021-06-11 VITALS
HEART RATE: 66 BPM | DIASTOLIC BLOOD PRESSURE: 63 MMHG | SYSTOLIC BLOOD PRESSURE: 100 MMHG | TEMPERATURE: 97 F | RESPIRATION RATE: 16 BRPM

## 2021-06-11 DIAGNOSIS — D50.9 IRON DEFICIENCY ANEMIA, UNSPECIFIED IRON DEFICIENCY ANEMIA TYPE: Primary | ICD-10-CM

## 2021-06-11 PROCEDURE — 96374 THER/PROPH/DIAG INJ IV PUSH: CPT

## 2021-06-11 RX ORDER — SODIUM CHLORIDE 9 MG/ML
20 INJECTION, SOLUTION INTRAVENOUS ONCE
Status: CANCELLED | OUTPATIENT
Start: 2021-06-25

## 2021-06-11 RX ORDER — SODIUM CHLORIDE 9 MG/ML
20 INJECTION, SOLUTION INTRAVENOUS ONCE
Status: COMPLETED | OUTPATIENT
Start: 2021-06-11 | End: 2021-06-11

## 2021-06-11 RX ADMIN — SODIUM CHLORIDE 20 ML/HR: 0.9 INJECTION, SOLUTION INTRAVENOUS at 10:00

## 2021-06-11 NOTE — PROGRESS NOTES
Patient tolerated venofer push, AVS printed and patient aware of next appointment, left infusion center in baseline condition

## 2021-06-11 NOTE — PLAN OF CARE
Problem: Potential for Falls  Goal: Patient will remain free of falls  Description: INTERVENTIONS:  - Assess patient frequently for physical needs  -  Identify cognitive and physical deficits and behaviors that affect risk of falls    -  [place] fall precautions as indicated by assessment   - Educate patient/family on patient safety including physical limitations  - Instruct patient to call for assistance with activity based on assessment  - Modify environment to reduce risk of injury  - Consider OT/PT consult to assist with strengthening/mobility  Outcome: Progressing

## 2021-06-25 ENCOUNTER — HOSPITAL ENCOUNTER (OUTPATIENT)
Dept: INFUSION CENTER | Facility: CLINIC | Age: 51
Discharge: HOME | End: 2021-06-25
Payer: COMMERCIAL

## 2021-06-25 VITALS
SYSTOLIC BLOOD PRESSURE: 107 MMHG | TEMPERATURE: 96.8 F | DIASTOLIC BLOOD PRESSURE: 69 MMHG | RESPIRATION RATE: 20 BRPM | HEART RATE: 66 BPM

## 2021-06-25 DIAGNOSIS — D50.9 IRON DEFICIENCY ANEMIA, UNSPECIFIED IRON DEFICIENCY ANEMIA TYPE: Primary | ICD-10-CM

## 2021-06-25 PROCEDURE — 96374 THER/PROPH/DIAG INJ IV PUSH: CPT

## 2021-06-25 RX ORDER — SODIUM CHLORIDE 9 MG/ML
20 INJECTION, SOLUTION INTRAVENOUS ONCE
Status: CANCELLED | OUTPATIENT
Start: 2021-06-25

## 2021-06-25 RX ORDER — SODIUM CHLORIDE 9 MG/ML
20 INJECTION, SOLUTION INTRAVENOUS ONCE
Status: COMPLETED | OUTPATIENT
Start: 2021-06-25 | End: 2021-06-25

## 2021-06-25 RX ADMIN — SODIUM CHLORIDE 20 ML/HR: 0.9 INJECTION, SOLUTION INTRAVENOUS at 10:57

## 2021-06-25 NOTE — PROGRESS NOTES
Pt arrived to unit without complaint  Pt tolerated Venofer push without incident  AVS declined, pt does not have future appts at the infusion center at this time  Pt left unit in stable condition

## 2021-07-23 ENCOUNTER — APPOINTMENT (OUTPATIENT)
Dept: LAB | Facility: MEDICAL CENTER | Age: 51
End: 2021-07-23
Payer: COMMERCIAL

## 2021-07-23 DIAGNOSIS — R10.9 ABDOMINAL PAIN, UNSPECIFIED ABDOMINAL LOCATION: ICD-10-CM

## 2021-07-23 DIAGNOSIS — T14.8XXA BRUISING: ICD-10-CM

## 2021-07-23 DIAGNOSIS — Z98.84 BARIATRIC SURGERY STATUS: ICD-10-CM

## 2021-07-23 DIAGNOSIS — D50.9 IRON DEFICIENCY ANEMIA, UNSPECIFIED IRON DEFICIENCY ANEMIA TYPE: ICD-10-CM

## 2021-07-23 LAB
ALBUMIN SERPL BCP-MCNC: 3.9 G/DL (ref 3.5–5)
ALP SERPL-CCNC: 74 U/L (ref 46–116)
ALT SERPL W P-5'-P-CCNC: 36 U/L (ref 12–78)
ANION GAP SERPL CALCULATED.3IONS-SCNC: 4 MMOL/L (ref 4–13)
APTT PPP: 45 SECONDS (ref 23–37)
AST SERPL W P-5'-P-CCNC: 13 U/L (ref 5–45)
BASOPHILS # BLD AUTO: 0.06 THOUSANDS/ΜL (ref 0–0.1)
BASOPHILS NFR BLD AUTO: 1 % (ref 0–1)
BILIRUB SERPL-MCNC: 0.36 MG/DL (ref 0.2–1)
BUN SERPL-MCNC: 14 MG/DL (ref 5–25)
CALCIUM SERPL-MCNC: 9.6 MG/DL (ref 8.3–10.1)
CHLORIDE SERPL-SCNC: 104 MMOL/L (ref 100–108)
CO2 SERPL-SCNC: 29 MMOL/L (ref 21–32)
CREAT SERPL-MCNC: 0.71 MG/DL (ref 0.6–1.3)
EOSINOPHIL # BLD AUTO: 0.16 THOUSAND/ΜL (ref 0–0.61)
EOSINOPHIL NFR BLD AUTO: 3 % (ref 0–6)
ERYTHROCYTE [DISTWIDTH] IN BLOOD BY AUTOMATED COUNT: 12.4 % (ref 11.6–15.1)
FERRITIN SERPL-MCNC: 155 NG/ML (ref 8–388)
GFR SERPL CREATININE-BSD FRML MDRD: 116 ML/MIN/1.73SQ M
GLUCOSE P FAST SERPL-MCNC: 70 MG/DL (ref 65–99)
HCT VFR BLD AUTO: 36.6 % (ref 34.8–46.1)
HGB BLD-MCNC: 11.7 G/DL (ref 11.5–15.4)
IMM GRANULOCYTES # BLD AUTO: 0 THOUSAND/UL (ref 0–0.2)
IMM GRANULOCYTES NFR BLD AUTO: 0 % (ref 0–2)
INR PPP: 1.13 (ref 0.84–1.19)
LYMPHOCYTES # BLD AUTO: 2.47 THOUSANDS/ΜL (ref 0.6–4.47)
LYMPHOCYTES NFR BLD AUTO: 48 % (ref 14–44)
MCH RBC QN AUTO: 30.2 PG (ref 26.8–34.3)
MCHC RBC AUTO-ENTMCNC: 32 G/DL (ref 31.4–37.4)
MCV RBC AUTO: 95 FL (ref 82–98)
MONOCYTES # BLD AUTO: 0.52 THOUSAND/ΜL (ref 0.17–1.22)
MONOCYTES NFR BLD AUTO: 10 % (ref 4–12)
NEUTROPHILS # BLD AUTO: 1.96 THOUSANDS/ΜL (ref 1.85–7.62)
NEUTS SEG NFR BLD AUTO: 38 % (ref 43–75)
NRBC BLD AUTO-RTO: 0 /100 WBCS
PLATELET # BLD AUTO: 322 THOUSANDS/UL (ref 149–390)
PMV BLD AUTO: 9.6 FL (ref 8.9–12.7)
POTASSIUM SERPL-SCNC: 3.8 MMOL/L (ref 3.5–5.3)
PROT SERPL-MCNC: 8.2 G/DL (ref 6.4–8.2)
PROTHROMBIN TIME: 14.5 SECONDS (ref 11.6–14.5)
RBC # BLD AUTO: 3.87 MILLION/UL (ref 3.81–5.12)
SODIUM SERPL-SCNC: 137 MMOL/L (ref 136–145)
WBC # BLD AUTO: 5.17 THOUSAND/UL (ref 4.31–10.16)

## 2021-07-23 PROCEDURE — 85025 COMPLETE CBC W/AUTO DIFF WBC: CPT

## 2021-07-23 PROCEDURE — 85245 CLOT FACTOR VIII VW RISTOCTN: CPT

## 2021-07-23 PROCEDURE — 85610 PROTHROMBIN TIME: CPT

## 2021-07-23 PROCEDURE — 80053 COMPREHEN METABOLIC PANEL: CPT

## 2021-07-23 PROCEDURE — 82728 ASSAY OF FERRITIN: CPT

## 2021-07-23 PROCEDURE — 36415 COLL VENOUS BLD VENIPUNCTURE: CPT

## 2021-07-23 PROCEDURE — 85730 THROMBOPLASTIN TIME PARTIAL: CPT

## 2021-07-27 LAB — VWF:RCO ACT/NOR PPP PL AGG: 55 % (ref 50–200)

## 2021-08-02 ENCOUNTER — OFFICE VISIT (OUTPATIENT)
Dept: BARIATRICS | Facility: CLINIC | Age: 51
End: 2021-08-02
Payer: COMMERCIAL

## 2021-08-02 VITALS
DIASTOLIC BLOOD PRESSURE: 72 MMHG | HEART RATE: 52 BPM | TEMPERATURE: 98.6 F | BODY MASS INDEX: 25.66 KG/M2 | WEIGHT: 163.5 LBS | SYSTOLIC BLOOD PRESSURE: 98 MMHG | HEIGHT: 67 IN

## 2021-08-02 DIAGNOSIS — Z98.84 BARIATRIC SURGERY STATUS: ICD-10-CM

## 2021-08-02 DIAGNOSIS — K91.2 POSTSURGICAL MALABSORPTION: ICD-10-CM

## 2021-08-02 DIAGNOSIS — Z48.815 ENCOUNTER FOR SURGICAL AFTERCARE FOLLOWING SURGERY OF DIGESTIVE SYSTEM: Primary | ICD-10-CM

## 2021-08-02 PROCEDURE — 3008F BODY MASS INDEX DOCD: CPT | Performed by: PHYSICIAN ASSISTANT

## 2021-08-02 PROCEDURE — 1036F TOBACCO NON-USER: CPT | Performed by: PHYSICIAN ASSISTANT

## 2021-08-02 PROCEDURE — 99214 OFFICE O/P EST MOD 30 MIN: CPT | Performed by: PHYSICIAN ASSISTANT

## 2021-08-02 NOTE — PROGRESS NOTES
Assessment/Plan:     Patient ID: Ena Carmona is a 54 y o  female  Bariatric Surgery Status    s/p RNYGB wit hiatal hernia repair with Dr Sarah Alejandro 7/14/2020   Presents to the office today for annual  Doing very well  · Continued/Maintain healthy weight loss with good nutrition intakes  · Adequate hydration with at least 64oz  fluid intake  · Follow diet as discussed  · Follow vitamin and mineral recommendations as reviewed with you  · Exercise as tolerated  · Colonoscopy referral made: utd    · Follow-up in 6 months for 18 month postop  We kindly ask that your arrive 15 minutes before your scheduled appointment time with your provider to allow our staff to room you, get your vital signs and update your chart  · Get lab work done  Please call the office if you need a script  It is recommended to check with your insurance BEFORE getting labs done to make sure they are covered by your policy  · Call our office if you have any problems with abdominal pain especially associated with fever, chills, nausea, vomiting or any other concerns  · All  Post-bariatric surgery patients should be aware that very small quantities of any alcohol can cause impairment and it is very possible not to feel the effect  The effect can be in the system for several hours  It is also a stomach irritant  · It is advised to AVOID alcohol, Nonsteroidal antiinflammatory drugs (NSAIDS) and nicotine of all forms   Any of these can cause stomach irritation/pain  · Discussed the effects of alcohol on a bariatric patient and the increased impairment risk  · Keep up the good work!      Postsurgical Malabsorption   -At risk for malabsorption of vitamins/minerals secondary to malabsorption and restriction of intake from bariatric surgery  -Currently taking adequate postop bariatric surgery vitamin supplementation  - s/p infusions per hematology, improved iron labs   - started additional vitamin A due to low level 3 months ago; pending repeat this week will stop if level is normal   -had full bariatric panel done in April; will order next set of labs at her next visit which is her 18 month postop  -Patient received education about the importance of adhering to a lifelong supplementation regimen to avoid vitamin/mineral deficiencies      Diagnoses and all orders for this visit:    Encounter for surgical aftercare following surgery of digestive system    Bariatric surgery status    Postsurgical malabsorption         Subjective:      Patient ID: Eric Hayes is a 54 y o  female  s/p RNYGB wit hiatal hernia repair with Dr Arden Zayas 7/14/2020   Presents to the office today for annual     Initial: 270  Current:163 5  EWL: (Weight loss is ahead of schedule at this post surgical period )  Riky: current  Current BMI is Body mass index is 25 46 kg/m²  · Tolerating a regular diet-yes  · Eating at least 60 grams of protein per day-yes  · Following 30/60 minute rule with liquids-yes  · Drinking at least 64 ounces of fluid per day-yes  · Drinking carbonated beverages-no  · Sufficient exercise-gym  · Using NSAIDs regularly-no  · Using nicotine-no  · Using alcohol-no  · Supplements: bariatric mvi + calcium  Still on daily omeprazole - will start to wean   · EWL is 98%, which places the patient ahead of schedule for expected post surgical weight loss at this time  The following portions of the patient's history were reviewed and updated as appropriate: allergies, current medications, past family history, past medical history, past social history, past surgical history and problem list     Review of Systems   Constitutional: Negative  Respiratory: Negative  Cardiovascular: Negative  Gastrointestinal: Negative  Neurological: Negative  Psychiatric/Behavioral: Negative            Objective:    BP 98/72 (BP Location: Left arm, Patient Position: Sitting, Cuff Size: Standard)   Pulse (!) 52   Temp 98 6 °F (37 °C) (Tympanic)  5' 7 2" (1 707 m)   Wt 74 2 kg (163 lb 8 oz)   BMI 25 46 kg/m²      Physical Exam  Vitals and nursing note reviewed  Constitutional:       Appearance: Normal appearance  HENT:      Head: Normocephalic and atraumatic  Eyes:      Extraocular Movements: Extraocular movements intact  Pupils: Pupils are equal, round, and reactive to light  Cardiovascular:      Rate and Rhythm: Normal rate and regular rhythm  Pulmonary:      Effort: Pulmonary effort is normal       Breath sounds: Normal breath sounds  Abdominal:      General: Bowel sounds are normal    Musculoskeletal:         General: Normal range of motion  Cervical back: Normal range of motion  Skin:     General: Skin is warm and dry  Neurological:      General: No focal deficit present  Mental Status: She is alert and oriented to person, place, and time     Psychiatric:         Mood and Affect: Mood normal          Behavior: Behavior normal

## 2021-08-02 NOTE — PATIENT INSTRUCTIONS
· Follow-up in 6 months for 18 month postop  We kindly ask that your arrive 15 minutes before your scheduled appointment time with your provider to allow our staff to room you, get your vital signs and update your chart  · Get lab work done  Please call the office if you need a script  It is recommended to check with your insurance BEFORE getting labs done to make sure they are covered by your policy  · Call our office if you have any problems with abdominal pain especially associated with fever, chills, nausea, vomiting or any other concerns  · All  Post-bariatric surgery patients should be aware that very small quantities of any alcohol can cause impairment and it is very possible not to feel the effect  The effect can be in the system for several hours  It is also a stomach irritant  · It is advised to AVOID alcohol, Nonsteroidal antiinflammatory drugs (NSAIDS) and nicotine of all forms   Any of these can cause stomach irritation/pain  · Discussed the effects of alcohol on a bariatric patient and the increased impairment risk  · Keep up the good work!

## 2021-09-28 ENCOUNTER — APPOINTMENT (OUTPATIENT)
Dept: LAB | Facility: MEDICAL CENTER | Age: 51
End: 2021-09-28
Payer: COMMERCIAL

## 2021-09-28 DIAGNOSIS — D50.9 IRON DEFICIENCY ANEMIA, UNSPECIFIED IRON DEFICIENCY ANEMIA TYPE: ICD-10-CM

## 2021-09-28 LAB
ALBUMIN SERPL BCP-MCNC: 3.5 G/DL (ref 3.5–5)
ALP SERPL-CCNC: 66 U/L (ref 46–116)
ALT SERPL W P-5'-P-CCNC: 33 U/L (ref 12–78)
ANION GAP SERPL CALCULATED.3IONS-SCNC: 3 MMOL/L (ref 4–13)
AST SERPL W P-5'-P-CCNC: 17 U/L (ref 5–45)
BASOPHILS # BLD AUTO: 0.07 THOUSANDS/ΜL (ref 0–0.1)
BASOPHILS NFR BLD AUTO: 1 % (ref 0–1)
BILIRUB SERPL-MCNC: 0.41 MG/DL (ref 0.2–1)
BUN SERPL-MCNC: 19 MG/DL (ref 5–25)
CALCIUM SERPL-MCNC: 9.1 MG/DL (ref 8.3–10.1)
CHLORIDE SERPL-SCNC: 104 MMOL/L (ref 100–108)
CO2 SERPL-SCNC: 30 MMOL/L (ref 21–32)
CREAT SERPL-MCNC: 0.7 MG/DL (ref 0.6–1.3)
EOSINOPHIL # BLD AUTO: 0.2 THOUSAND/ΜL (ref 0–0.61)
EOSINOPHIL NFR BLD AUTO: 4 % (ref 0–6)
ERYTHROCYTE [DISTWIDTH] IN BLOOD BY AUTOMATED COUNT: 12.3 % (ref 11.6–15.1)
FERRITIN SERPL-MCNC: 157 NG/ML (ref 8–388)
GFR SERPL CREATININE-BSD FRML MDRD: 118 ML/MIN/1.73SQ M
GLUCOSE P FAST SERPL-MCNC: 90 MG/DL (ref 65–99)
HCT VFR BLD AUTO: 34.6 % (ref 34.8–46.1)
HGB BLD-MCNC: 11 G/DL (ref 11.5–15.4)
IMM GRANULOCYTES # BLD AUTO: 0.01 THOUSAND/UL (ref 0–0.2)
IMM GRANULOCYTES NFR BLD AUTO: 0 % (ref 0–2)
IRON SATN MFR SERPL: 36 % (ref 15–50)
IRON SERPL-MCNC: 95 UG/DL (ref 50–170)
LYMPHOCYTES # BLD AUTO: 2.6 THOUSANDS/ΜL (ref 0.6–4.47)
LYMPHOCYTES NFR BLD AUTO: 45 % (ref 14–44)
MCH RBC QN AUTO: 30.4 PG (ref 26.8–34.3)
MCHC RBC AUTO-ENTMCNC: 31.8 G/DL (ref 31.4–37.4)
MCV RBC AUTO: 96 FL (ref 82–98)
MONOCYTES # BLD AUTO: 0.59 THOUSAND/ΜL (ref 0.17–1.22)
MONOCYTES NFR BLD AUTO: 10 % (ref 4–12)
NEUTROPHILS # BLD AUTO: 2.29 THOUSANDS/ΜL (ref 1.85–7.62)
NEUTS SEG NFR BLD AUTO: 40 % (ref 43–75)
NRBC BLD AUTO-RTO: 0 /100 WBCS
PLATELET # BLD AUTO: 283 THOUSANDS/UL (ref 149–390)
PMV BLD AUTO: 9.9 FL (ref 8.9–12.7)
POTASSIUM SERPL-SCNC: 4.2 MMOL/L (ref 3.5–5.3)
PROT SERPL-MCNC: 7.8 G/DL (ref 6.4–8.2)
RBC # BLD AUTO: 3.62 MILLION/UL (ref 3.81–5.12)
SODIUM SERPL-SCNC: 137 MMOL/L (ref 136–145)
TIBC SERPL-MCNC: 266 UG/DL (ref 250–450)
WBC # BLD AUTO: 5.76 THOUSAND/UL (ref 4.31–10.16)

## 2021-09-28 PROCEDURE — 83540 ASSAY OF IRON: CPT

## 2021-09-28 PROCEDURE — 85025 COMPLETE CBC W/AUTO DIFF WBC: CPT

## 2021-09-28 PROCEDURE — 82728 ASSAY OF FERRITIN: CPT

## 2021-09-28 PROCEDURE — 80053 COMPREHEN METABOLIC PANEL: CPT

## 2021-09-28 PROCEDURE — 36415 COLL VENOUS BLD VENIPUNCTURE: CPT

## 2021-09-28 PROCEDURE — 83550 IRON BINDING TEST: CPT

## 2021-10-05 ENCOUNTER — TELEPHONE (OUTPATIENT)
Dept: HEMATOLOGY ONCOLOGY | Facility: CLINIC | Age: 51
End: 2021-10-05

## 2021-10-05 DIAGNOSIS — D50.9 IRON DEFICIENCY ANEMIA, UNSPECIFIED IRON DEFICIENCY ANEMIA TYPE: Primary | ICD-10-CM

## 2021-10-12 DIAGNOSIS — E66.9 OBESITY, UNSPECIFIED: ICD-10-CM

## 2021-10-14 RX ORDER — OMEPRAZOLE 20 MG/1
20 CAPSULE, DELAYED RELEASE ORAL DAILY
Qty: 30 CAPSULE | Refills: 3 | OUTPATIENT
Start: 2021-10-14

## 2021-10-19 DIAGNOSIS — E66.9 OBESITY, UNSPECIFIED: ICD-10-CM

## 2021-10-19 RX ORDER — OMEPRAZOLE 20 MG/1
20 CAPSULE, DELAYED RELEASE ORAL DAILY
Qty: 30 CAPSULE | Refills: 1 | Status: SHIPPED | OUTPATIENT
Start: 2021-10-19

## 2022-01-05 ENCOUNTER — TELEPHONE (OUTPATIENT)
Dept: HEMATOLOGY ONCOLOGY | Facility: CLINIC | Age: 52
End: 2022-01-05

## 2022-01-05 NOTE — TELEPHONE ENCOUNTER
Called patient to make her aware that there were some labs to be collected before she sees Ivette Olivier in the office  Stated that she should give the office a call back to get this appointment rescheduled beings that the labs that patient needs take a couple days to come back  This is why we cancelled the appointment  Patient to call back to r/s

## 2022-02-02 ENCOUNTER — OFFICE VISIT (OUTPATIENT)
Dept: BARIATRICS | Facility: CLINIC | Age: 52
End: 2022-02-02
Payer: COMMERCIAL

## 2022-02-02 VITALS
WEIGHT: 155.7 LBS | RESPIRATION RATE: 14 BRPM | DIASTOLIC BLOOD PRESSURE: 60 MMHG | TEMPERATURE: 97.9 F | HEART RATE: 65 BPM | HEIGHT: 67 IN | SYSTOLIC BLOOD PRESSURE: 100 MMHG | BODY MASS INDEX: 24.44 KG/M2

## 2022-02-02 DIAGNOSIS — K91.2 POSTSURGICAL MALABSORPTION: ICD-10-CM

## 2022-02-02 DIAGNOSIS — Z98.84 BARIATRIC SURGERY STATUS: ICD-10-CM

## 2022-02-02 DIAGNOSIS — D50.9 IRON DEFICIENCY ANEMIA, UNSPECIFIED IRON DEFICIENCY ANEMIA TYPE: ICD-10-CM

## 2022-02-02 DIAGNOSIS — Z48.815 ENCOUNTER FOR SURGICAL AFTERCARE FOLLOWING SURGERY OF DIGESTIVE SYSTEM: Primary | ICD-10-CM

## 2022-02-02 PROCEDURE — 99213 OFFICE O/P EST LOW 20 MIN: CPT | Performed by: PHYSICIAN ASSISTANT

## 2022-02-02 RX ORDER — BLOOD SUGAR DIAGNOSTIC
STRIP MISCELLANEOUS
COMMUNITY
Start: 2022-02-02

## 2022-02-02 NOTE — PROGRESS NOTES
Assessment/Plan:     Patient ID: Natalia Roberts is a 54 y o  female  Bariatric Surgery Status    s/p RNYGB wit hiatal hernia repair with Dr Florentino Favre 7/14/2020   Presents to the office today for 18 month postop  Doing very well  Iron def anemia - hs iron infusions, followed by heme     · Continued/Maintain healthy weight loss with good nutrition intakes  · Adequate hydration with at least 64oz  fluid intake  · Follow diet as discussed  · Follow vitamin and mineral recommendations as reviewed with you  · Exercise as tolerated  · Colonoscopy referral made: has not yet started, talk to pcp   · Mammogram: needs to scheduled     · Follow-up in 6 months  We kindly ask that your arrive 15 minutes before your scheduled appointment time with your provider to allow our staff to room you, get your vital signs and update your chart  · Get lab work done  Please call the office if you need a script  It is recommended to check with your insurance BEFORE getting labs done to make sure they are covered by your policy  · Call our office if you have any problems with abdominal pain especially associated with fever, chills, nausea, vomiting or any other concerns  · All  Post-bariatric surgery patients should be aware that very small quantities of any alcohol can cause impairment and it is very possible not to feel the effect  The effect can be in the system for several hours  It is also a stomach irritant  · It is advised to AVOID alcohol, Nonsteroidal antiinflammatory drugs (NSAIDS) and nicotine of all forms   Any of these can cause stomach irritation/pain  · Discussed the effects of alcohol on a bariatric patient and the increased impairment risk  · Keep up the good work!      Postsurgical Malabsorption   -At risk for malabsorption of vitamins/minerals secondary to malabsorption and restriction of intake from bariatric surgery  -Currently taking adequate postop bariatric surgery vitamin supplementation  -Last set of bariatric labs completed 4/2021  - iron labs followed by heme   -Next set of bariatric labs ordered for approximately 1 month  -Patient received education about the importance of adhering to a lifelong supplementation regimen to avoid vitamin/mineral deficiencies      Diagnoses and all orders for this visit:    Encounter for surgical aftercare following surgery of digestive system  -     Zinc; Future  -     Vitamin D 25 hydroxy; Future  -     Vitamin B12; Future  -     Vitamin B1, whole blood; Future  -     Vitamin A; Future  -     Comprehensive metabolic panel; Future  -     Ferritin; Future  -     Folate; Future  -     Iron Saturation %; Future  -     PTH, intact; Future    Bariatric surgery status  -     Zinc; Future  -     Vitamin D 25 hydroxy; Future  -     Vitamin B12; Future  -     Vitamin B1, whole blood; Future  -     Vitamin A; Future  -     Comprehensive metabolic panel; Future  -     Ferritin; Future  -     Folate; Future  -     Iron Saturation %; Future  -     PTH, intact; Future    Postsurgical malabsorption  -     Zinc; Future  -     Vitamin D 25 hydroxy; Future  -     Vitamin B12; Future  -     Vitamin B1, whole blood; Future  -     Vitamin A; Future  -     Comprehensive metabolic panel; Future  -     Ferritin; Future  -     Folate; Future  -     Iron Saturation %; Future  -     PTH, intact; Future    BMI 24 0-24 9, adult  -     Zinc; Future  -     Vitamin D 25 hydroxy; Future  -     Vitamin B12; Future  -     Vitamin B1, whole blood; Future  -     Vitamin A; Future  -     Comprehensive metabolic panel; Future  -     Ferritin; Future  -     Folate; Future  -     Iron Saturation %; Future  -     PTH, intact; Future    Iron deficiency anemia, unspecified iron deficiency anemia type  -     Zinc; Future  -     Vitamin D 25 hydroxy; Future  -     Vitamin B12; Future  -     Vitamin B1, whole blood; Future  -     Vitamin A; Future  -     Comprehensive metabolic panel;  Future  - Ferritin; Future  -     Folate; Future  -     Iron Saturation %; Future  -     PTH, intact; Future    Other orders  -     glucose blood test strip; OneTouch Verio test strips  -     glucose blood test strip; OneTouch Verio test strips   USE 1 STRIP 3 TIMES A DAY AS NEEDED  -     OneTouch Verio test strip         Subjective:      Patient ID: Pam Carlos is a 54 y o  female  s/p RNYGB wit hiatal hernia repair with Dr Sully Sam 7/14/2020   Presents to the office today for 18 month postop  Doing very well  Initial: 270  Current:155  EWL: (Weight loss is ahead of schedule at this post surgical period )  Riky: current   Current BMI is Body mass index is 24 24 kg/m²  · Tolerating a regular diet-yes  · Eating at least 60 grams of protein per day-yes  · Following 30/60 minute rule with liquids-yes  · Drinking at least 64 ounces of fluid per day-yes  · Drinking carbonated beverages-no  · Sufficient exercise-yes  · Using NSAIDs regularly-no  · Using nicotine-no  · Using alcohol-no  · Supplements: centrum mvi + calcium  She is off omeprazole - doing well     The following portions of the patient's history were reviewed and updated as appropriate: allergies, current medications, past family history, past medical history, past social history, past surgical history and problem list     Review of Systems   Constitutional: Negative  Respiratory: Negative  Cardiovascular: Negative  Gastrointestinal: Negative  Musculoskeletal: Negative  Skin: Negative  Neurological: Negative  Psychiatric/Behavioral: Negative  Objective:    /60   Pulse 65   Temp 97 9 °F (36 6 °C) (Tympanic)   Resp 14   Ht 5' 7 2" (1 707 m)   Wt 70 6 kg (155 lb 11 2 oz)   BMI 24 24 kg/m²      Physical Exam  Vitals and nursing note reviewed  Constitutional:       Appearance: Normal appearance  HENT:      Head: Normocephalic and atraumatic  Eyes:      Extraocular Movements: Extraocular movements intact  Pupils: Pupils are equal, round, and reactive to light  Cardiovascular:      Rate and Rhythm: Normal rate and regular rhythm  Pulmonary:      Effort: Pulmonary effort is normal       Breath sounds: Normal breath sounds  Abdominal:      General: Bowel sounds are normal       Tenderness: There is no abdominal tenderness  Musculoskeletal:         General: Normal range of motion  Cervical back: Normal range of motion  Skin:     General: Skin is warm and dry  Neurological:      General: No focal deficit present  Mental Status: She is alert and oriented to person, place, and time     Psychiatric:         Mood and Affect: Mood normal

## 2022-02-02 NOTE — PATIENT INSTRUCTIONS
· Follow-up in 6 months  We kindly ask that your arrive 15 minutes before your scheduled appointment time with your provider to allow our staff to room you, get your vital signs and update your chart  · Get lab work done  Please call the office if you need a script  It is recommended to check with your insurance BEFORE getting labs done to make sure they are covered by your policy  · Call our office if you have any problems with abdominal pain especially associated with fever, chills, nausea, vomiting or any other concerns  · All  Post-bariatric surgery patients should be aware that very small quantities of any alcohol can cause impairment and it is very possible not to feel the effect  The effect can be in the system for several hours  It is also a stomach irritant  · It is advised to AVOID alcohol, Nonsteroidal antiinflammatory drugs (NSAIDS) and nicotine of all forms   Any of these can cause stomach irritation/pain  · Discussed the effects of alcohol on a bariatric patient and the increased impairment risk  · Keep up the good work!

## 2022-05-16 ENCOUNTER — OFFICE VISIT (OUTPATIENT)
Dept: URGENT CARE | Facility: CLINIC | Age: 52
End: 2022-05-16
Payer: COMMERCIAL

## 2022-05-16 VITALS
HEART RATE: 78 BPM | RESPIRATION RATE: 18 BRPM | SYSTOLIC BLOOD PRESSURE: 112 MMHG | OXYGEN SATURATION: 100 % | DIASTOLIC BLOOD PRESSURE: 72 MMHG | TEMPERATURE: 99.1 F

## 2022-05-16 DIAGNOSIS — J01.10 ACUTE FRONTAL SINUSITIS, RECURRENCE NOT SPECIFIED: Primary | ICD-10-CM

## 2022-05-16 PROCEDURE — 99213 OFFICE O/P EST LOW 20 MIN: CPT | Performed by: NURSE PRACTITIONER

## 2022-05-16 RX ORDER — AZITHROMYCIN 250 MG/1
TABLET, FILM COATED ORAL
Qty: 6 TABLET | Refills: 0 | Status: SHIPPED | OUTPATIENT
Start: 2022-05-16 | End: 2022-05-20

## 2022-05-16 NOTE — LETTER
May 17, 2022     Patient: Laura Fleming   YOB: 1970   Date of Visit: 5/16/2022       To Whom It May Concern: It is my medical opinion that Laura Fleming may return to work on 5/18/2022  If you have any questions or concerns, please don't hesitate to call           Sincerely,        DANA Norman    CC: No Recipients

## 2022-05-16 NOTE — PROGRESS NOTES
[facility]        NAME: Timothy Canavan is a 50 y o  female  : 1970    MRN: [id number]  DATE: May 16, 2022  TIME: 5:52 PM    Assessment and Plan   Acute frontal sinusitis, recurrence not specified [J01 10]  1  Acute frontal sinusitis, recurrence not specified  azithromycin (ZITHROMAX) 250 mg tablet     Start course of zithromax   Recommend flonase and decongestants   F/u with pcp in 3-5 days   Pt in agreement with plan    Patient Instructions     Follow up with PCP in 3-5 days  Proceed to  ER if symptoms worsen  Chief Complaint     Chief Complaint   Patient presents with    Cold Like Symptoms     Patient with sinus congestion, drainage and pressure for 2 weeks  History of Present Illness   Timothy Canavan presents to the clinic c/o    Cold Like Symptoms (Patient with sinus congestion, drainage and pressure for 2 weeks )  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  Has had a negative covid test for work   Usually gets sinus infections this time of year - starts as allergies and worsens into a sinus infection  Review of Systems   Review of Systems   All other systems reviewed and are negative          Current Medications     Long-Term Medications   Medication Sig Dispense Refill    cetirizine (ZyrTEC) 10 mg tablet Take 10 mg by mouth daily as needed       clotrimazole-betamethasone (LOTRISONE) 1-0 05 % cream clotrimazole-betamethasone 1 %-0 05 % topical cream   PLEASE SEE ATTACHED FOR DETAILED DIRECTIONS      ergocalciferol (VITAMIN D2) 50,000 units TAKE 1 CAPSULE BY MOUTH ONE TIME PER WEEK      estradiol (ESTRACE) 0 1 mg/g vaginal cream Insert 1 g into the vagina 2 (two) times a week 42 5 g 3    ferrous sulfate 325 (65 Fe) mg tablet Take 325 mg by mouth daily       fluticasone (FLONASE) 50 mcg/act nasal spray SPRAY 1 SPRAY INTO EACH NOSTRIL EVERY DAY      Magnesium 250 MG TABS Take 250 mg by mouth daily       polyethylene glycol (GLYCOLAX) 17 GM/SCOOP powder Take 17 g by mouth daily 510 g 1    vitamin B-12 (VITAMIN B-12) 1,000 mcg tablet Take 1 tablet (1,000 mcg total) by mouth daily 30 tablet 3    Ascorbic Acid (vitamin C) 1000 MG tablet Take 1 tablet (1,000 mg total) by mouth 2 (two) times a day for 7 days 14 tablet 0    omeprazole (PriLOSEC) 20 mg delayed release capsule Take 1 capsule (20 mg total) by mouth daily (Patient not taking: Reported on 5/16/2022) 30 capsule 1    vitamin A 3 MG (28592 UT) capsule Take 1 capsule (10,000 Units total) by mouth daily 90 capsule 0       Current Allergies     Allergies as of 05/16/2022 - Reviewed 05/16/2022   Allergen Reaction Noted    Penicillins Hives 12/03/2018    Cephalosporins  07/09/2020            The following portions of the patient's history were reviewed and updated as appropriate: allergies, current medications, past family history, past medical history, past social history, past surgical history and problem list     Objective   /72   Pulse 78   Temp 99 1 °F (37 3 °C) (Tympanic)   Resp 18   LMP  (LMP Unknown)   SpO2 100%        Physical Exam     Physical Exam  Vitals and nursing note reviewed  Constitutional:       Appearance: Normal appearance  She is well-developed  HENT:      Head: Normocephalic and atraumatic  Right Ear: Hearing, tympanic membrane, ear canal and external ear normal       Left Ear: Hearing, tympanic membrane, ear canal and external ear normal       Nose: Mucosal edema and congestion present  Right Sinus: Frontal sinus tenderness present  Left Sinus: Frontal sinus tenderness present  Eyes:      General: Lids are normal       Conjunctiva/sclera: Conjunctivae normal    Cardiovascular:      Rate and Rhythm: Normal rate and regular rhythm  Heart sounds: Normal heart sounds, S1 normal and S2 normal    Pulmonary:      Effort: Pulmonary effort is normal       Breath sounds: Normal breath sounds  Skin:     General: Skin is warm and dry     Neurological:      Mental Status: She is alert and oriented to person, place, and time  Psychiatric:         Speech: Speech normal          Behavior: Behavior normal  Behavior is cooperative  Thought Content:  Thought content normal          Judgment: Judgment normal

## 2022-05-16 NOTE — PATIENT INSTRUCTIONS
Start Claritin-D  Continue with increased hydration  Sinusitis   AMBULATORY CARE:   Sinusitis  is inflammation or infection of your sinuses  Sinusitis is most often caused by a virus  Acute sinusitis may last up to 12 weeks  Chronic sinusitis lasts longer than 12 weeks  Recurrent sinusitis means you have 4 or more infections in 1 year  Common signs and symptoms:   Fever    Pain, pressure, redness, or swelling around the forehead, cheeks, or eyes    Thick yellow or green discharge from your nose    Tenderness when you touch your face over your sinuses    Dry cough that happens mostly at night or when you lie down    Headache and face pain that is worse when you lean forward    Tooth pain, or pain when you chew    Seek care immediately if:   You have trouble breathing or wheezing that is getting worse  You have a stiff neck, a fever, or a bad headache  You cannot open your eye  Your eyeball bulges out or you cannot move your eye  You are more sleepy than normal, or you notice changes in your ability to think, move, or talk  You have swelling of your forehead or scalp  Call your doctor if:   You have vision changes, such as double vision  Your eye and eyelid are red, swollen, and painful  Your symptoms do not improve or go away after 10 days  You have nausea and are vomiting  Your nose is bleeding  You have questions or concerns about your condition or care  Medicines: Your symptoms may go away on their own  Your healthcare provider may recommend watchful waiting for up to 10 days before starting antibiotics  You may need any of the following:  Acetaminophen  decreases pain and fever  It is available without a doctor's order  Ask how much to take and how often to take it  Follow directions   Read the labels of all other medicines you are using to see if they also contain acetaminophen, or ask your doctor or pharmacist  Acetaminophen can cause liver damage if not taken correctly  Do not use more than 4 grams (4,000 milligrams) total of acetaminophen in one day  NSAIDs , such as ibuprofen, help decrease swelling, pain, and fever  This medicine is available with or without a doctor's order  NSAIDs can cause stomach bleeding or kidney problems in certain people  If you take blood thinner medicine, always ask your healthcare provider if NSAIDs are safe for you  Always read the medicine label and follow directions  Nasal steroid sprays  may help decrease inflammation in your nose and sinuses  Decongestants  help reduce swelling and drain mucus in the nose and sinuses  They may help you breathe easier  Antihistamines  help dry mucus in the nose and relieve sneezing  Antibiotics  help treat or prevent a bacterial infection  Self-care:   Rinse your sinuses as directed  Use a sinus rinse device to rinse your nasal passages with a saline (salt water) solution or distilled water  Do not use tap water  This will help thin the mucus in your nose and rinse away pollen and dirt  It will also help reduce swelling so you can breathe normally  Use a humidifier  to increase air moisture in your home  This may make it easier for you to breathe and help decrease your cough  Sleep with your head elevated  Place an extra pillow under your head before you go to sleep to help your sinuses drain  Drink liquids as directed  Ask your healthcare provider how much liquid to drink each day and which liquids are best for you  Liquids will thin the mucus in your nose and help it drain  Avoid drinks that contain alcohol or caffeine  Do not smoke, and avoid secondhand smoke  Nicotine and other chemicals in cigarettes and cigars can make your symptoms worse  Ask your healthcare provider for information if you currently smoke and need help to quit  E-cigarettes or smokeless tobacco still contain nicotine   Talk to your healthcare provider before you use these products  Prevent the spread of germs:   Wash your hands often with soap and water  Wash your hands after you use the bathroom, change a child's diaper, or sneeze  Wash your hands before you prepare or eat food  Stay away from people who are sick  Some germs spread easily and quickly through contact  Follow up with your doctor as directed: You may be referred to an ear, nose, and throat specialist  Write down your questions so you remember to ask them during your visits  © Copyright [employer] 2022 Information is for End User's use only and may not be sold, redistributed or otherwise used for commercial purposes  All illustrations and images included in CareNotes® are the copyrighted property of A D A M , Inc  or [facility] Carrol Torres   The above information is an  only  It is not intended as medical advice for individual conditions or treatments  Talk to your doctor, nurse or pharmacist before following any medical regimen to see if it is safe and effective for you

## 2022-06-09 ENCOUNTER — OFFICE VISIT (OUTPATIENT)
Dept: OBGYN CLINIC | Facility: CLINIC | Age: 52
End: 2022-06-09
Payer: COMMERCIAL

## 2022-06-09 VITALS
SYSTOLIC BLOOD PRESSURE: 92 MMHG | OXYGEN SATURATION: 98 % | TEMPERATURE: 97.7 F | WEIGHT: 154.8 LBS | DIASTOLIC BLOOD PRESSURE: 60 MMHG | HEIGHT: 67 IN | HEART RATE: 87 BPM | BODY MASS INDEX: 24.3 KG/M2

## 2022-06-09 DIAGNOSIS — N76.0 BACTERIAL VAGINOSIS: ICD-10-CM

## 2022-06-09 DIAGNOSIS — N90.89 VULVAR LESION: Primary | ICD-10-CM

## 2022-06-09 DIAGNOSIS — Z12.31 ENCOUNTER FOR SCREENING MAMMOGRAM FOR BREAST CANCER: ICD-10-CM

## 2022-06-09 DIAGNOSIS — B96.89 BACTERIAL VAGINOSIS: ICD-10-CM

## 2022-06-09 LAB
BV WHIFF TEST VAG QL: ABNORMAL
CLUE CELLS SPEC QL WET PREP: PRESENT
PH SMN: ABNORMAL [PH]
SL AMB POCT WET MOUNT: ABNORMAL
T VAGINALIS VAG QL WET PREP: ABNORMAL
YEAST VAG QL WET PREP: ABNORMAL

## 2022-06-09 PROCEDURE — 87210 SMEAR WET MOUNT SALINE/INK: CPT | Performed by: OBSTETRICS & GYNECOLOGY

## 2022-06-09 PROCEDURE — 99213 OFFICE O/P EST LOW 20 MIN: CPT | Performed by: OBSTETRICS & GYNECOLOGY

## 2022-06-09 PROCEDURE — 87255 GENET VIRUS ISOLATE HSV: CPT | Performed by: OBSTETRICS & GYNECOLOGY

## 2022-06-09 RX ORDER — METRONIDAZOLE 7.5 MG/G
1 GEL VAGINAL DAILY
Qty: 70 G | Refills: 0 | Status: SHIPPED | OUTPATIENT
Start: 2022-06-09 | End: 2022-06-14

## 2022-06-09 NOTE — PROGRESS NOTES
Subjective   Patient ID: Lauri Pagan is a 50 y o  female  Patient is here for a problem visit  Chief Complaint   Patient presents with    Vaginal irritation     Pt had allergic reaction to ciprofloxacin for UTI, hives on mouth/lips also vaginal irritation/redness, not sure if this is also an allergic reaction or yeast infection due to the abx     Started ciprofloxacin for UTI - last week  Had allergic type reaction with hives/swelling of lips  A day after starting ciprofloxacin noticed increased vaginal irritation, redness, stinging with wiping  Yellow vaginal discharge, some itching/burning, +odor  Denies dysuria or other urinary sx   No new sexual partners   Denies h/o genital HSV     Menstrual History:  OB History        10    Para   4    Term   3       1    AB   6    Living   4       SAB   6    IAB   0    Ectopic   0    Multiple   0    Live Births   4                No LMP recorded (lmp unknown)   Patient is postmenopausal          Past Medical History:   Diagnosis Date    Anemia     "mild"    Anxiety     occas    Colon polyp     GERD (gastroesophageal reflux disease)     Heart murmur     "with pregnancy than went away"    Hiatal hernia     History of kidney stones     History of transfusion     "years ago after a miscarriage"    Obesity     PCOS (polycystic ovarian syndrome)     Shortness of breath     occas    Wears glasses        Past Surgical History:   Procedure Laterality Date    BARIATRIC SURGERY      COLONOSCOPY  2019    DILATION AND CURETTAGE OF UTERUS      D&E    EGD      ESSURE TUBAL LIGATION      ME LAP GASTRIC BYPASS/DAVID-EN-Y N/A 2020    Procedure: BYPASS GASTRIC DAVID-EN-Y LAPAROSCOPIC W ROBOTICS W/ INTRAOPERATIVE EGD;  Surgeon: Alma Obrien MD;  Location: [facility] OR;  Service: Bariatrics    UPPER GASTROINTESTINAL ENDOSCOPY  2019       Social History     Tobacco Use    Smoking status: Former Smoker    Smokeless tobacco: Never Used Vaping Use    Vaping Use: Former   Substance Use Topics    Alcohol use: No    Drug use: No        Allergies   Allergen Reactions    Penicillins Hives    Cephalosporins      Avoids due to penicllin allergy    Ciprofloxacin Hives         Current Outpatient Medications:     albuterol (PROVENTIL HFA,VENTOLIN HFA) 90 mcg/act inhaler, albuterol sulfate HFA 90 mcg/actuation aerosol inhaler  TAKE 2 PUFFS BY MOUTH EVERY 4 HOURS, Disp: , Rfl:     cetirizine (ZyrTEC) 10 mg tablet, Take 10 mg by mouth daily as needed , Disp: , Rfl:     Cholecalciferol (VITAMIN D3) 1 25 MG (19872 UT) CAPS, Take 50,000 Units by mouth once a week , Disp: , Rfl:     ferrous sulfate 325 (65 Fe) mg tablet, Take 325 mg by mouth daily , Disp: , Rfl:     fluticasone (FLONASE) 50 mcg/act nasal spray, SPRAY 1 SPRAY INTO EACH NOSTRIL EVERY DAY, Disp: , Rfl:     Magnesium 250 MG TABS, Take 250 mg by mouth daily , Disp: , Rfl:     metroNIDAZOLE (METROGEL) 0 75 % vaginal gel, Insert 1 application into the vagina in the morning for 5 days, Disp: 70 g, Rfl: 0    Multiple Vitamins-Minerals (CENTRUM PO), Take 1 tablet by mouth daily Bariatric vitamin, Disp: , Rfl:     polyethylene glycol (GLYCOLAX) 17 GM/SCOOP powder, Take 17 g by mouth daily, Disp: 510 g, Rfl: 1    vitamin B-12 (VITAMIN B-12) 1,000 mcg tablet, Take 1 tablet (1,000 mcg total) by mouth daily, Disp: 30 tablet, Rfl: 3    estradiol (ESTRACE) 0 1 mg/g vaginal cream, Insert 1 g into the vagina 2 (two) times a week (Patient not taking: No sig reported), Disp: 42 5 g, Rfl: 3    glucose blood test strip, OneTouch Verio test strips, Disp: , Rfl:     glucose blood test strip, OneTouch Verio test strips  USE 1 STRIP 3 TIMES A DAY AS NEEDED, Disp: , Rfl:     omeprazole (PriLOSEC) 20 mg delayed release capsule, Take 1 capsule (20 mg total) by mouth daily (Patient not taking: No sig reported), Disp: 30 capsule, Rfl: 1    OneTouch Verio test strip, , Disp: , Rfl:     vitamin A 3 MG (82881 UT) capsule, Take 1 capsule (10,000 Units total) by mouth daily, Disp: 90 capsule, Rfl: 0      Review of Systems   Constitutional: Negative for appetite change, chills and fever  Eyes: Negative for visual disturbance  Respiratory: Negative for cough, chest tightness and shortness of breath  Cardiovascular: Negative for chest pain  Gastrointestinal: Negative for abdominal distention, abdominal pain, constipation, diarrhea, nausea and vomiting  Endocrine: Negative for cold intolerance and heat intolerance  Genitourinary: Positive for vaginal discharge and vaginal pain  Negative for difficulty urinating, dyspareunia, dysuria, frequency, genital sores, pelvic pain, urgency and vaginal bleeding  Musculoskeletal: Negative for arthralgias  Neurological: Negative for light-headedness and headaches  Hematological: Does not bruise/bleed easily  Psychiatric/Behavioral: Negative for behavioral problems  All other systems reviewed and are negative  BP 92/60 (BP Location: Right arm, Patient Position: Sitting, Cuff Size: Adult)   Pulse 87   Temp 97 7 °F (36 5 °C) (Tympanic)   Ht 5' 7" (1 702 m)   Wt 70 2 kg (154 lb 12 8 oz)   LMP  (LMP Unknown)   SpO2 98%   BMI 24 25 kg/m²       Physical Exam  Constitutional:       General: She is not in acute distress  Appearance: Normal appearance  She is not ill-appearing  Genitourinary:      Bladder and urethral meatus normal       Right Labia: No rash, tenderness, lesions or skin changes  Left Labia: No tenderness, lesions, skin changes or rash  No labial fusion noted  Vulva exam comments: Two small well circumscribed vesicular erythematous appearing lesions on bilateral apex of clitoral moore, tender with palpation   No inguinal adenopathy present in the right or left side  Vaginal discharge present  No vaginal erythema, tenderness, bleeding or ulceration  Vaginal exam comments: Homogenous pale yellow discharge   Right Adnexa: not tender, not full and no mass present  Left Adnexa: not tender, not full and no mass present  No cervical motion tenderness, discharge, friability, lesion, polyp or eversion  Uterus is not enlarged, fixed, tender or irregular  No uterine mass detected  Uterus is anteverted  Pelvic exam was performed with patient in the lithotomy position  HENT:      Head: Normocephalic  Cardiovascular:      Rate and Rhythm: Normal rate  Heart sounds: Normal heart sounds  Pulmonary:      Effort: Pulmonary effort is normal  No accessory muscle usage or respiratory distress  Abdominal:      General: There is no distension  Palpations: Abdomen is soft  There is no mass  Tenderness: There is no abdominal tenderness  There is no guarding or rebound  Musculoskeletal:         General: Normal range of motion  Cervical back: No rigidity  Lymphadenopathy:      Lower Body: No right inguinal adenopathy  No left inguinal adenopathy  Neurological:      General: No focal deficit present  Mental Status: She is alert  Mental status is at baseline  Skin:     General: Skin is warm and dry  Psychiatric:         Mood and Affect: Mood normal          Behavior: Behavior normal    Vitals and nursing note reviewed  Exam conducted with a chaperone present  Results for orders placed or performed in visit on 06/09/22   POCT wet mount   Result Value Ref Range    WET MOUNT -     Yeast, Wet Prep absent     pH elevated     Whiff Test -     Clue Cells present     Trich, Wet Prep absent        Appropriate laboratory testing, imaging studies, and prior external records were reviewed:     Assessment/Plan:       Problem List Items Addressed This Visit        Genitourinary    Bacterial vaginosis     Exam findings today consistent with bacterial vaginosis infection  Recommend treatment with metrogel  Vulvar hygiene measures and medication instructions reviewed  Relevant Medications    metroNIDAZOLE (METROGEL) 0 75 % vaginal gel    Other Relevant Orders    POCT wet mount (Completed)       Other    Vulvar lesion - Primary     Pt denies h/o STI or genital HSV or similar lesions  Monogamous with     HSV culture obtained            Relevant Orders    Herpes simplex virus culture      Other Visit Diagnoses     Encounter for screening mammogram for breast cancer        Relevant Orders    Mammo screening bilateral w 3d & cad

## 2022-06-09 NOTE — PATIENT INSTRUCTIONS
Call Central scheduling at [phone] to set up this appointment: mammogram     Recommended the following vulvar hygiene measures:  Avoid scented products (pads, tampons, soaps, detergents, perfumes, sprays, bubble baths, wipes)  Avoid unnecessary prolonged usage of pads  Wear cotton underwear and avoid tight fitting pants/tights/leotards  Change out of damp exercise clothes and swimsuits as soon as possible  Sleep without underwear  No douching  If you use the Always brand pad, try switching to another brand or tampons instead  Do not use a washcloth or soap directly on the vagina

## 2022-06-09 NOTE — ASSESSMENT & PLAN NOTE
Exam findings today consistent with bacterial vaginosis infection  Recommend treatment with metrogel  Vulvar hygiene measures and medication instructions reviewed

## 2022-06-14 LAB — HSV SPEC CULT: NORMAL

## 2022-07-01 ENCOUNTER — OFFICE VISIT (OUTPATIENT)
Dept: BARIATRICS | Facility: CLINIC | Age: 52
End: 2022-07-01
Payer: COMMERCIAL

## 2022-07-01 VITALS
OXYGEN SATURATION: 98 % | BODY MASS INDEX: 24.4 KG/M2 | WEIGHT: 155.5 LBS | DIASTOLIC BLOOD PRESSURE: 58 MMHG | TEMPERATURE: 98.1 F | HEIGHT: 67 IN | HEART RATE: 64 BPM | SYSTOLIC BLOOD PRESSURE: 98 MMHG

## 2022-07-01 DIAGNOSIS — Z12.11 ENCOUNTER FOR SCREENING COLONOSCOPY: ICD-10-CM

## 2022-07-01 DIAGNOSIS — K91.2 POSTSURGICAL MALABSORPTION: ICD-10-CM

## 2022-07-01 DIAGNOSIS — Z98.84 BARIATRIC SURGERY STATUS: ICD-10-CM

## 2022-07-01 DIAGNOSIS — Z48.812 ENCOUNTER FOR SURGICAL AFTERCARE FOLLOWING SURGERY OF CIRCULATORY SYSTEM: Primary | ICD-10-CM

## 2022-07-01 PROCEDURE — 99213 OFFICE O/P EST LOW 20 MIN: CPT | Performed by: PHYSICIAN ASSISTANT

## 2022-07-01 RX ORDER — FLUCONAZOLE 150 MG/1
150 TABLET ORAL DAILY
COMMUNITY
Start: 2022-06-08

## 2022-07-01 RX ORDER — CETIRIZINE HYDROCHLORIDE 10 MG/1
TABLET ORAL DAILY
COMMUNITY

## 2022-07-01 RX ORDER — ALBUTEROL SULFATE 90 UG/1
AEROSOL, METERED RESPIRATORY (INHALATION)
COMMUNITY

## 2022-07-01 RX ORDER — METRONIDAZOLE 7.5 MG/G
GEL VAGINAL
COMMUNITY

## 2022-07-01 NOTE — PATIENT INSTRUCTIONS
Follow-up in 1 year  We kindly ask that your arrive 15 minutes before your scheduled appointment time with your provider to allow our staff to room you, get your vital signs and update your chart  Get lab work done  Please call the office if you need a script  It is recommended to check with your insurance BEFORE getting labs done to make sure they are covered by your policy  Call our office if you have any problems with abdominal pain especially associated with fever, chills, nausea, vomiting or any other concerns  All  Post-bariatric surgery patients should be aware that very small quantities of any alcohol can cause impairment and it is very possible not to feel the effect  The effect can be in the system for several hours  It is also a stomach irritant  It is advised to AVOID alcohol, Nonsteroidal antiinflammatory drugs (NSAIDS) and nicotine of all forms   Any of these can cause stomach irritation/pain  Discussed the effects of alcohol on a bariatric patient and the increased impairment risk  Keep up the good work!

## 2022-07-01 NOTE — PROGRESS NOTES
Assessment/Plan:     Patient ID: Alejandro Keating is a 50 y o  female  Bariatric Surgery Status    s/p RNYGB wit hiatal hernia repair with Dr Jeffy Morgan 7/14/2020   Presents to the office today Anshul Russo years annual  Doing very well  Iron def anemia - completed iron infusions, will repeat iron with labs     · Continued/Maintain healthy weight loss with good nutrition intakes  · Adequate hydration with at least 64oz  fluid intake  · Follow diet as discussed  · Follow vitamin and mineral recommendations as reviewed with you  · Exercise as tolerated  · Colonoscopy referral made: yes  · Mammogram: needs to be scheduled - advised to get done     · Follow-up in 1 year  We kindly ask that your arrive 15 minutes before your scheduled appointment time with your provider to allow our staff to room you, get your vital signs and update your chart  · Get lab work done  Please call the office if you need a script  It is recommended to check with your insurance BEFORE getting labs done to make sure they are covered by your policy  · Call our office if you have any problems with abdominal pain especially associated with fever, chills, nausea, vomiting or any other concerns  · All  Post-bariatric surgery patients should be aware that very small quantities of any alcohol can cause impairment and it is very possible not to feel the effect  The effect can be in the system for several hours  It is also a stomach irritant  · It is advised to AVOID alcohol, Nonsteroidal antiinflammatory drugs (NSAIDS) and nicotine of all forms   Any of these can cause stomach irritation/pain  · Discussed the effects of alcohol on a bariatric patient and the increased impairment risk  · Keep up the good work!      Postsurgical Malabsorption   -At risk for malabsorption of vitamins/minerals secondary to malabsorption and restriction of intake from bariatric surgery  -Currently taking adequate postop bariatric surgery vitamin supplementation  -Last set of bariatric labs completed 2/2022 - need to get done   -Patient received education about the importance of adhering to a lifelong supplementation regimen to avoid vitamin/mineral deficiencies      Diagnoses and all orders for this visit:    Encounter for surgical aftercare following surgery of circulatory system    Bariatric surgery status    Postsurgical malabsorption    BMI 24 0-24 9, adult    Encounter for screening colonoscopy  -     Ambulatory referral for colonoscopy; Future    Other orders  -     cetirizine (ZyrTEC) 10 mg tablet; Daily (Patient not taking: Reported on 7/1/2022)  -     fluconazole (DIFLUCAN) 150 mg tablet; Take 150 mg by mouth daily (Patient not taking: Reported on 7/1/2022)  -     metroNIDAZOLE (METROGEL) 0 75 % vaginal gel; metronidazole 0 75 % vaginal gel (Patient not taking: Reported on 7/1/2022)  -     albuterol (PROVENTIL HFA,VENTOLIN HFA) 90 mcg/act inhaler; albuterol sulfate HFA 90 mcg/actuation aerosol inhaler   INHALE 2 PUFFS BY MOUTH EVERY 4 HOURS AS NEEDED         Subjective:      Patient ID: Cam Nelson is a 50 y o  female  s/p RNYGB wit hiatal hernia repair with Dr Natalee Irving 7/14/2020   Presents to the office today for 2 years annual  Doing very well  Initial:270  Current:155  EWL: (Weight loss is ahead of schedule at this post surgical period )  Riky: current   Current BMI is Body mass index is 24 21 kg/m²  · Tolerating a regular diet-yes  · Eating at least 60 grams of protein per day-yes  · Following 30/60 minute rule with liquids-yes  · Drinking at least 64 ounces of fluid per day-yes  · Drinking carbonated beverages-no  · Sufficient exercise-yes  · Using NSAIDs regularly-no  · Using nicotine-no  · Using alcohol-no  · Supplements: centrum MVI + calcium + iron    · EWL is 106%, which places the patient ahead of schedule for expected post surgical weight loss at this time       The following portions of the patient's history were reviewed and updated as appropriate: allergies, current medications, past family history, past medical history, past social history, past surgical history and problem list     Review of Systems   Constitutional: Negative  Respiratory: Negative  Cardiovascular: Negative  Gastrointestinal: Negative  Musculoskeletal: Negative  Skin: Negative  Neurological: Negative  Psychiatric/Behavioral: Negative  Objective:    BP 98/58 (BP Location: Left arm, Patient Position: Sitting, Cuff Size: Large)   Pulse 64   Temp 98 1 °F (36 7 °C) (Tympanic)   Ht 5' 7 2" (1 707 m)   Wt 70 5 kg (155 lb 8 oz)   LMP  (LMP Unknown)   SpO2 98%   BMI 24 21 kg/m²      Physical Exam  Vitals and nursing note reviewed  Constitutional:       Appearance: Normal appearance  HENT:      Head: Normocephalic and atraumatic  Eyes:      Extraocular Movements: Extraocular movements intact  Pupils: Pupils are equal, round, and reactive to light  Cardiovascular:      Rate and Rhythm: Normal rate and regular rhythm  Pulmonary:      Effort: Pulmonary effort is normal       Breath sounds: Normal breath sounds  Abdominal:      General: Bowel sounds are normal       Tenderness: There is no abdominal tenderness  Musculoskeletal:         General: Normal range of motion  Cervical back: Normal range of motion  Skin:     General: Skin is warm and dry  Neurological:      General: No focal deficit present  Mental Status: She is alert and oriented to person, place, and time     Psychiatric:         Mood and Affect: Mood normal

## 2022-10-12 PROBLEM — J12.82 PNEUMONIA DUE TO COVID-19 VIRUS: Status: RESOLVED | Noted: 2021-01-06 | Resolved: 2022-10-12

## 2022-10-12 PROBLEM — Z12.4 CERVICAL CANCER SCREENING: Status: RESOLVED | Noted: 2021-03-12 | Resolved: 2022-10-12

## 2022-10-12 PROBLEM — U07.1 PNEUMONIA DUE TO COVID-19 VIRUS: Status: RESOLVED | Noted: 2021-01-06 | Resolved: 2022-10-12

## 2022-10-24 NOTE — TELEPHONE ENCOUNTER
Patient returning your call [FreeTextEntry1] : Mrs. Sally Perlman returned to the office having been last seen on December 7, 2020.  She is a 70-year-old right-handed patient who presented with subacute progressive predominantly amnestic symptoms.  Her neurologic examination was nonfocal.  Differential diagnosis included normal aging, cerebrovascular disease and neurodegenerative disorders like Alzheimer's and Parkinson's.  She had a probable incidental frontal extra-axial mass likely representing a small meningioma.  She had a remote history of treated Lyme disease.\par \par MRI of the brain performed in March 2020 revealed a calcified lesion of the anterior falx consistent with meningioma.  The study was otherwise unremarkable.  MR venogram revealed no evidence of severe stenosis or occlusion of the dural venous sinuses.  Lyme Western blot IgG was negative.  TSH was normal and syphilis IgG was nonreactive.  Autoimmune dementia evaluation was negative.\par \par Mrs. Perlman complains of persistent short-term memory loss.  She denies hallucinations or paranoia.  She is totally functional.  She often wakes up in the middle the night and has trouble falling asleep again.  She has had recent low back pain with right-sided sciatica.  MRI of the lumbar spine revealed severe stenosis at L3-4.  She is undergoing physical therapy.\par \par Medications include levothyroxine, niacinamide, zinc, vitamin B12 and vitamin D3.

## 2022-11-11 ENCOUNTER — TELEPHONE (OUTPATIENT)
Dept: BARIATRICS | Facility: CLINIC | Age: 52
End: 2022-11-11

## 2022-11-11 NOTE — TELEPHONE ENCOUNTER
Pt called wanting to know what her calorie goals should be for weight maintenance  Used [employer] with pt's self-reported current weight of 160lbs, and self-reported activity level of gym workout 2 days per week  Weight maintenance goal of 9439-6900 kcal/day

## 2023-01-13 ENCOUNTER — TELEPHONE (OUTPATIENT)
Dept: GASTROENTEROLOGY | Facility: CLINIC | Age: 53
End: 2023-01-13

## 2023-01-13 NOTE — TELEPHONE ENCOUNTER
Patients GI provider:  CELIA Sharma    Number to return call: [phone]    Reason for call: PT FAILED OA - HAD GALLBLADDER REMOVAL IN NOV OR DEC AT [facility]    Scheduled procedure/appointment date if applicable: Appt: 9 /1/40

## 2023-05-19 ENCOUNTER — OFFICE VISIT (OUTPATIENT)
Dept: URGENT CARE | Facility: CLINIC | Age: 53
End: 2023-05-19

## 2023-05-19 VITALS
SYSTOLIC BLOOD PRESSURE: 106 MMHG | HEART RATE: 91 BPM | OXYGEN SATURATION: 97 % | RESPIRATION RATE: 20 BRPM | DIASTOLIC BLOOD PRESSURE: 60 MMHG | TEMPERATURE: 97 F

## 2023-05-19 DIAGNOSIS — J01.90 ACUTE SINUSITIS, RECURRENCE NOT SPECIFIED, UNSPECIFIED LOCATION: Primary | ICD-10-CM

## 2023-05-19 DIAGNOSIS — R05.9 COUGH, UNSPECIFIED TYPE: ICD-10-CM

## 2023-05-19 RX ORDER — AZITHROMYCIN 250 MG/1
TABLET, FILM COATED ORAL
Qty: 6 TABLET | Refills: 0 | Status: SHIPPED | OUTPATIENT
Start: 2023-05-19 | End: 2023-05-24

## 2023-05-19 RX ORDER — ALBUTEROL SULFATE 90 UG/1
2 AEROSOL, METERED RESPIRATORY (INHALATION) EVERY 6 HOURS PRN
Qty: 18 G | Refills: 0 | Status: SHIPPED | OUTPATIENT
Start: 2023-05-19

## 2023-05-19 NOTE — PATIENT INSTRUCTIONS
Take antibiotic as instructed  Nasal saline rinses every couple hours while awake to decrease nasal congestion drainage postnasal drip  Flonase routinely  Continue Zyrtec  May add decongestant expectorant to help slow down mucus production and thin mucus so you clear easier  Use inhaler as needed  Follow up with PCP if not improving over next 3-5 days

## 2023-05-19 NOTE — PROGRESS NOTES
[facility]    NAME: Tevin Michaud is a 48 y o  female  : 1970    MRN: [id number]  DATE: May 19, 2023  TIME: 10:44 AM    Assessment and Plan   Acute sinusitis, recurrence not specified, unspecified location [J01 90]  1  Acute sinusitis, recurrence not specified, unspecified location  azithromycin (ZITHROMAX) 250 mg tablet      2  Cough, unspecified type  albuterol (PROVENTIL HFA,VENTOLIN HFA) 90 mcg/act inhaler          Patient Instructions   Patient Instructions   Take antibiotic as instructed  Nasal saline rinses every couple hours while awake to decrease nasal congestion drainage postnasal drip  Flonase routinely  Continue Zyrtec  May add decongestant expectorant to help slow down mucus production and thin mucus so you clear easier  Use inhaler as needed  Follow up with PCP if not improving over next 3-5 days  Chief Complaint     Chief Complaint   Patient presents with   • Cough     Pt C/O a productive cough that started out as possible allergy symptoms and progressed into a thick yellow-greenish phellem  History of Present Illness   Tevin Michaud presents to the clinic c/o  61-year-old female comes in with complaint of cough  Started:  Couple weeks ago  Thought allergy related  Associated signs and symptoms: No fever or chills but some fatigue  Some increased cough with talking or laughing  Mucus is now getting thicker and more discolored  Modifying factors: Using her allergy medicine  Known Exposures:  has been sick and is now on antibiotic  Hx asthma: Says no history of specific asthma attacks but has asthmatic tendencies  Review of Systems   Review of Systems   Constitutional: Negative  HENT: Positive for congestion and postnasal drip  Negative for ear discharge, ear pain, rhinorrhea, sinus pressure, sinus pain and sore throat  Eyes: Negative  Respiratory: Positive for cough and chest tightness   Negative for shortness of breath, wheezing "and stridor  Cardiovascular: Negative for chest pain, palpitations and leg swelling         Current Medications     Long-Term Medications   Medication Sig Dispense Refill   • cetirizine (ZyrTEC) 10 mg tablet Take 10 mg by mouth daily as needed      • ferrous sulfate 325 (65 Fe) mg tablet Take 325 mg by mouth daily      • fluticasone (FLONASE) 50 mcg/act nasal spray SPRAY 1 SPRAY INTO EACH NOSTRIL EVERY DAY     • Magnesium 250 MG TABS Take 250 mg by mouth daily      • vitamin B-12 (VITAMIN B-12) 1,000 mcg tablet Take 1 tablet (1,000 mcg total) by mouth daily 30 tablet 3   • cetirizine (ZyrTEC) 10 mg tablet Daily (Patient not taking: Reported on 7/1/2022)     • estradiol (ESTRACE) 0 1 mg/g vaginal cream Insert 1 g into the vagina 2 (two) times a week (Patient not taking: Reported on 6/9/2022) 42 5 g 3   • omeprazole (PriLOSEC) 20 mg delayed release capsule Take 1 capsule (20 mg total) by mouth daily (Patient not taking: Reported on 5/16/2022) 30 capsule 1   • polyethylene glycol (GLYCOLAX) 17 GM/SCOOP powder Take 17 g by mouth daily (Patient not taking: Reported on 5/19/2023) 510 g 1   • vitamin A 3 MG (55857 UT) capsule Take 1 capsule (10,000 Units total) by mouth daily 90 capsule 0       Current Allergies     Allergies as of 05/19/2023 - Reviewed 05/19/2023   Allergen Reaction Noted   • Penicillins Hives 12/03/2018   • Cephalosporins  07/09/2020   • Ciprofloxacin Hives 06/09/2022          The following portions of the patient's history were reviewed and updated as appropriate: allergies, current medications, past family history, past medical history, past social history, past surgical history and problem list   Past Medical History:   Diagnosis Date   • Anemia     \"mild\"   • Anxiety     occas   • Colon polyp    • GERD (gastroesophageal reflux disease)    • Heart murmur     \"with pregnancy than went away\"   • Hiatal hernia    • History of kidney stones    • History of transfusion     \"years ago after a miscarriage\" " • Obesity    • PCOS (polycystic ovarian syndrome)    • Shortness of breath     occas   • Wears glasses      Past Surgical History:   Procedure Laterality Date   • BARIATRIC SURGERY     • COLONOSCOPY  11/2019   • DILATION AND CURETTAGE OF UTERUS      D&E   • EGD     • ESSURE TUBAL LIGATION     • FL LAPS GSTR RSTCV PX W/BYP DAVID-EN-Y LIMB <150 CM N/A 7/14/2020    Procedure: BYPASS GASTRIC DAVDI-EN-Y LAPAROSCOPIC W ROBOTICS W/ INTRAOPERATIVE EGD;  Surgeon: Lindsey Scott MD;  Location: [facility];  Service: Bariatrics   • UPPER GASTROINTESTINAL ENDOSCOPY  11/2019     Family History   Problem Relation Age of Onset   • Ovarian cancer Mother    • Hypertension Mother    • Obesity Mother    • No Known Problems Father    • No Known Problems Daughter    • No Known Problems Son    • Diabetes Maternal Grandmother    • No Known Problems Half-Sister    • No Known Problems Half-Sister    • No Known Problems Son    • No Known Problems Daughter        Objective   /60 (BP Location: Left arm, Patient Position: Sitting, Cuff Size: Standard)   Pulse 91   Temp (!) 97 °F (36 1 °C) (Tympanic)   Resp 20   LMP  (LMP Unknown)   SpO2 97%   No LMP recorded (lmp unknown)  Patient is postmenopausal        Physical Exam     Physical Exam  Vitals and nursing note reviewed  Constitutional:       General: She is not in acute distress  Appearance: She is well-developed  She is not ill-appearing, toxic-appearing or diaphoretic  Comments:  No trismus or conversational dyspnea  HENT:      Head: Normocephalic and atraumatic  Right Ear: Tympanic membrane, ear canal and external ear normal       Left Ear: Tympanic membrane, ear canal and external ear normal       Nose: Congestion present  No rhinorrhea  Mouth/Throat:      Mouth: Mucous membranes are moist       Pharynx: Posterior oropharyngeal erythema present  No oropharyngeal exudate        Comments: Cobblestoning posterior pharynx with patchy redness  Eyes: General:         Right eye: No discharge  Left eye: No discharge  Conjunctiva/sclera: Conjunctivae normal       Pupils: Pupils are equal, round, and reactive to light  Cardiovascular:      Rate and Rhythm: Normal rate and regular rhythm  Heart sounds: Normal heart sounds  No murmur heard  No friction rub  No gallop  Pulmonary:      Effort: Pulmonary effort is normal  No respiratory distress  Breath sounds: Normal breath sounds  No stridor  No wheezing, rhonchi or rales  Musculoskeletal:      Cervical back: Normal range of motion and neck supple  No rigidity or tenderness  Lymphadenopathy:      Cervical: No cervical adenopathy  Skin:     General: Skin is warm and dry  Coloration: Skin is not jaundiced or pale  Findings: No rash  Neurological:      Mental Status: She is alert and oriented to person, place, and time     Psychiatric:         Mood and Affect: Mood normal          Behavior: Behavior normal

## 2023-06-22 ENCOUNTER — OFFICE VISIT (OUTPATIENT)
Dept: BARIATRICS | Facility: CLINIC | Age: 53
End: 2023-06-22
Payer: COMMERCIAL

## 2023-06-22 VITALS
BODY MASS INDEX: 26.68 KG/M2 | WEIGHT: 170 LBS | TEMPERATURE: 98 F | HEART RATE: 75 BPM | HEIGHT: 67 IN | DIASTOLIC BLOOD PRESSURE: 66 MMHG | SYSTOLIC BLOOD PRESSURE: 110 MMHG

## 2023-06-22 DIAGNOSIS — K59.00 CONSTIPATION: ICD-10-CM

## 2023-06-22 DIAGNOSIS — K91.2 POSTSURGICAL MALABSORPTION: ICD-10-CM

## 2023-06-22 DIAGNOSIS — E66.01 OBESITY, CLASS III, BMI 40-49.9 (MORBID OBESITY) (HCC): ICD-10-CM

## 2023-06-22 DIAGNOSIS — Z12.11 ENCOUNTER FOR SCREENING COLONOSCOPY: ICD-10-CM

## 2023-06-22 DIAGNOSIS — E66.3 OVERWEIGHT: ICD-10-CM

## 2023-06-22 DIAGNOSIS — Z48.815 ENCOUNTER FOR SURGICAL AFTERCARE FOLLOWING SURGERY OF DIGESTIVE SYSTEM: Primary | ICD-10-CM

## 2023-06-22 DIAGNOSIS — K21.9 ACID REFLUX: ICD-10-CM

## 2023-06-22 DIAGNOSIS — Z98.84 BARIATRIC SURGERY STATUS: ICD-10-CM

## 2023-06-22 DIAGNOSIS — Z12.39 BREAST CANCER SCREENING: ICD-10-CM

## 2023-06-22 PROCEDURE — 99213 OFFICE O/P EST LOW 20 MIN: CPT | Performed by: PHYSICIAN ASSISTANT

## 2023-06-22 RX ORDER — OMEPRAZOLE 20 MG/1
20 CAPSULE, DELAYED RELEASE ORAL DAILY
Qty: 90 CAPSULE | Refills: 1 | Status: SHIPPED | OUTPATIENT
Start: 2023-06-22 | End: 2023-09-20

## 2023-06-22 RX ORDER — POLYETHYLENE GLYCOL 3350 17 G/17G
17 POWDER, FOR SOLUTION ORAL DAILY
Qty: 510 G | Refills: 1 | Status: SHIPPED | OUTPATIENT
Start: 2023-06-22

## 2023-06-22 NOTE — H&P (VIEW-ONLY)
Assessment/Plan:     Patient ID: Derrell Banegas is a 48 y.o. female. Bariatric Surgery Status    s/p RNYGB wit hiatal hernia repair with Dr. Srinivas Ohara 7/14/2020 . Presents to the office today [facility] Quirk years annual. Doing well but c/o reflux, which she states started after her cholecystectomy this past September. She reports intermittent heartburn and abdominal burning that seems to be more frequent. Symptoms worse with certain greasy foods ar at night. Denies vomiting and nausea. Reports occasional constipation. She is taking maalox and gaviscon prn.        · Continued/Maintain healthy weight loss with good nutrition intakes. · Adequate hydration with at least 64oz. fluid intake. · Follow diet as discussed. · Follow vitamin and mineral recommendations as reviewed with you. · Exercise as tolerated. · Colonoscopy referral made: was made last year but pt did not follow up, will place new referral   · Mammo: will order     · Follow-up in after EGD. We kindly ask that your arrive 15 minutes before your scheduled appointment time with your provider to allow our staff to room you, get your vital signs and update your chart. · Get lab work done. Please call the office if you need a script. It is recommended to check with your insurance BEFORE getting labs done to make sure they are covered by your policy. · Call our office if you have any problems with abdominal pain especially associated with fever, chills, nausea, vomiting or any other concerns. · All  Post-bariatric surgery patients should be aware that very small quantities of any alcohol can cause impairment and it is very possible not to feel the effect. The effect can be in the system for several hours. It is also a stomach irritant. · It is advised to AVOID alcohol, Nonsteroidal antiinflammatory drugs (NSAIDS) and nicotine of all forms . Any of these can cause stomach irritation/pain.     · Discussed the effects of alcohol on a bariatric patient and the increased impairment risk. · Keep up the good work! Postsurgical Malabsorption   -At risk for malabsorption of vitamins/minerals secondary to malabsorption and restriction of intake from bariatric surgery  -Currently taking adequate postop bariatric surgery vitamin supplementation  -Next set of bariatric labs ordered for approximately 2 weeks  -Patient received education about the importance of adhering to a lifelong supplementation regimen to avoid vitamin/mineral deficiencies      Diagnoses and all orders for this visit:    Encounter for surgical aftercare following surgery of digestive system  -     Zinc; Future  -     Vitamin D 25 hydroxy; Future  -     Vitamin B12; Future  -     Vitamin B1, whole blood; Future  -     Vitamin A; Future  -     CBC and Platelet; Future  -     Comprehensive metabolic panel; Future  -     Ferritin; Future  -     Folate; Future  -     PTH, intact; Future  -     Iron Saturation %; Future    Bariatric surgery status  -     Zinc; Future  -     Vitamin D 25 hydroxy; Future  -     Vitamin B12; Future  -     Vitamin B1, whole blood; Future  -     Vitamin A; Future  -     CBC and Platelet; Future  -     Comprehensive metabolic panel; Future  -     Ferritin; Future  -     Folate; Future  -     PTH, intact; Future  -     Iron Saturation %; Future  -     omeprazole (PriLOSEC) 20 mg delayed release capsule; Take 1 capsule (20 mg total) by mouth daily  -     Mammo screening bilateral w 3d & cad; Future  -     Ambulatory referral to Gastroenterology; Future    Postsurgical malabsorption  -     Zinc; Future  -     Vitamin D 25 hydroxy; Future  -     Vitamin B12; Future  -     Vitamin B1, whole blood; Future  -     Vitamin A; Future  -     CBC and Platelet; Future  -     Comprehensive metabolic panel; Future  -     Ferritin; Future  -     Folate; Future  -     PTH, intact; Future  -     Iron Saturation %;  Future    Overweight  -     Zinc; Future  -     Vitamin D 25 hydroxy; Future  -     Vitamin B12; Future  -     Vitamin B1, whole blood; Future  -     Vitamin A; Future  -     CBC and Platelet; Future  -     Comprehensive metabolic panel; Future  -     Ferritin; Future  -     Folate; Future  -     PTH, intact; Future  -     Iron Saturation %; Future    Acid reflux  -     Zinc; Future  -     Vitamin D 25 hydroxy; Future  -     Vitamin B12; Future  -     Vitamin B1, whole blood; Future  -     Vitamin A; Future  -     CBC and Platelet; Future  -     Comprehensive metabolic panel; Future  -     Ferritin; Future  -     Folate; Future  -     PTH, intact; Future  -     Iron Saturation %; Future  -     omeprazole (PriLOSEC) 20 mg delayed release capsule; Take 1 capsule (20 mg total) by mouth daily  -     Ambulatory referral to Gastroenterology; Future    Breast cancer screening  -     Mammo screening bilateral w 3d & cad; Future    Encounter for screening colonoscopy  -     Ambulatory referral for colon cancer education; Future    Obesity, Class III, BMI 40-49.9 (morbid obesity) (HCC)  -     polyethylene glycol (GLYCOLAX) 17 GM/SCOOP powder; Take 17 g by mouth daily    Constipation  -     polyethylene glycol (GLYCOLAX) 17 GM/SCOOP powder; Take 17 g by mouth daily         Subjective:      Patient ID: Malissa Sandoval is a 48 y.o. female. s/p RNYGB wit hiatal hernia repair with Dr. Anders Sales 7/14/2020 . Initial:270  Current:170  EWL: (Weight loss is ahead of schedule at this post surgical period.)  Riky: 155  Current BMI is Body mass index is 26.47 kg/m².     · Tolerating a regular diet-yes  · Eating at least 60 grams of protein per day-yes  · Following 30/60 minute rule with liquids-yes  · Drinking at least 64 ounces of fluid per day-yes  · Drinking carbonated beverages-no  · Sufficient exercise-gym twice a week  · Using NSAIDs regularly-no  · Using nicotine-no  · Using alcohol-no  · Supplements: enrico mvi + calcium + b12 + b complex      The following portions of the patient's history were reviewed and updated as appropriate: allergies, current medications, past family history, past medical history, past social history, past surgical history and problem list.    Review of Systems   Constitutional: Negative. Respiratory: Negative. Gastrointestinal: Positive for abdominal pain (occasional ) and constipation. Negative for diarrhea, nausea and vomiting. Neurological: Negative. Psychiatric/Behavioral: Negative.           Objective:    /66   Pulse 75   Temp 98 °F (36.7 °C) (Tympanic)   Ht 5' 7.2" (1.707 m)   Wt 77.1 kg (170 lb)   LMP  (LMP Unknown)   BMI 26.47 kg/m²      Physical Exam

## 2023-06-22 NOTE — PATIENT INSTRUCTIONS
Follow-up in after EGD  We kindly ask that your arrive 15 minutes before your scheduled appointment time with your provider to allow our staff to room you, get your vital signs and update your chart  Get lab work done  Please call the office if you need a script  It is recommended to check with your insurance BEFORE getting labs done to make sure they are covered by your policy  Call our office if you have any problems with abdominal pain especially associated with fever, chills, nausea, vomiting or any other concerns  All  Post-bariatric surgery patients should be aware that very small quantities of any alcohol can cause impairment and it is very possible not to feel the effect  The effect can be in the system for several hours  It is also a stomach irritant  It is advised to AVOID alcohol, Nonsteroidal antiinflammatory drugs (NSAIDS) and nicotine of all forms   Any of these can cause stomach irritation/pain  Discussed the effects of alcohol on a bariatric patient and the increased impairment risk  Keep up the good work!

## 2023-06-22 NOTE — PROGRESS NOTES
Assessment/Plan:     Patient ID: Wang Colby is a 48 y o  female  Bariatric Surgery Status    s/p RNYGB wit hiatal hernia repair with Dr Lucinda Blackman 7/14/2020   Presents to the office today [facility] Javier years annual  Doing well but c/o reflux, which she states started after her cholecystectomy this past September  She reports intermittent heartburn and abdominal burning that seems to be more frequent  Symptoms worse with certain greasy foods ar at night  Denies vomiting and nausea  Reports occasional constipation  She is taking maalox and gaviscon prn         · Continued/Maintain healthy weight loss with good nutrition intakes  · Adequate hydration with at least 64oz  fluid intake  · Follow diet as discussed  · Follow vitamin and mineral recommendations as reviewed with you  · Exercise as tolerated  · Colonoscopy referral made: was made last year but pt did not follow up, will place new referral   · Mammo: will order     · Follow-up in after EGD  We kindly ask that your arrive 15 minutes before your scheduled appointment time with your provider to allow our staff to room you, get your vital signs and update your chart  · Get lab work done  Please call the office if you need a script  It is recommended to check with your insurance BEFORE getting labs done to make sure they are covered by your policy  · Call our office if you have any problems with abdominal pain especially associated with fever, chills, nausea, vomiting or any other concerns  · All  Post-bariatric surgery patients should be aware that very small quantities of any alcohol can cause impairment and it is very possible not to feel the effect  The effect can be in the system for several hours  It is also a stomach irritant  · It is advised to AVOID alcohol, Nonsteroidal antiinflammatory drugs (NSAIDS) and nicotine of all forms   Any of these can cause stomach irritation/pain      · Discussed the effects of alcohol on a bariatric patient and the increased impairment risk  · Keep up the good work! Postsurgical Malabsorption   -At risk for malabsorption of vitamins/minerals secondary to malabsorption and restriction of intake from bariatric surgery  -Currently taking adequate postop bariatric surgery vitamin supplementation  -Next set of bariatric labs ordered for approximately 2 weeks  -Patient received education about the importance of adhering to a lifelong supplementation regimen to avoid vitamin/mineral deficiencies      Diagnoses and all orders for this visit:    Encounter for surgical aftercare following surgery of digestive system  -     Zinc; Future  -     Vitamin D 25 hydroxy; Future  -     Vitamin B12; Future  -     Vitamin B1, whole blood; Future  -     Vitamin A; Future  -     CBC and Platelet; Future  -     Comprehensive metabolic panel; Future  -     Ferritin; Future  -     Folate; Future  -     PTH, intact; Future  -     Iron Saturation %; Future    Bariatric surgery status  -     Zinc; Future  -     Vitamin D 25 hydroxy; Future  -     Vitamin B12; Future  -     Vitamin B1, whole blood; Future  -     Vitamin A; Future  -     CBC and Platelet; Future  -     Comprehensive metabolic panel; Future  -     Ferritin; Future  -     Folate; Future  -     PTH, intact; Future  -     Iron Saturation %; Future  -     omeprazole (PriLOSEC) 20 mg delayed release capsule; Take 1 capsule (20 mg total) by mouth daily  -     Mammo screening bilateral w 3d & cad; Future  -     Ambulatory referral to Gastroenterology; Future    Postsurgical malabsorption  -     Zinc; Future  -     Vitamin D 25 hydroxy; Future  -     Vitamin B12; Future  -     Vitamin B1, whole blood; Future  -     Vitamin A; Future  -     CBC and Platelet; Future  -     Comprehensive metabolic panel; Future  -     Ferritin; Future  -     Folate; Future  -     PTH, intact; Future  -     Iron Saturation %;  Future    Overweight  -     Zinc; Future  -     Vitamin D 25 hydroxy; Future  -     Vitamin B12; Future  -     Vitamin B1, whole blood; Future  -     Vitamin A; Future  -     CBC and Platelet; Future  -     Comprehensive metabolic panel; Future  -     Ferritin; Future  -     Folate; Future  -     PTH, intact; Future  -     Iron Saturation %; Future    Acid reflux  -     Zinc; Future  -     Vitamin D 25 hydroxy; Future  -     Vitamin B12; Future  -     Vitamin B1, whole blood; Future  -     Vitamin A; Future  -     CBC and Platelet; Future  -     Comprehensive metabolic panel; Future  -     Ferritin; Future  -     Folate; Future  -     PTH, intact; Future  -     Iron Saturation %; Future  -     omeprazole (PriLOSEC) 20 mg delayed release capsule; Take 1 capsule (20 mg total) by mouth daily  -     Ambulatory referral to Gastroenterology; Future    Breast cancer screening  -     Mammo screening bilateral w 3d & cad; Future    Encounter for screening colonoscopy  -     Ambulatory referral for colon cancer education; Future    Obesity, Class III, BMI 40-49 9 (morbid obesity) (HCC)  -     polyethylene glycol (GLYCOLAX) 17 GM/SCOOP powder; Take 17 g by mouth daily    Constipation  -     polyethylene glycol (GLYCOLAX) 17 GM/SCOOP powder; Take 17 g by mouth daily         Subjective:      Patient ID: Wade Smith is a 48 y o  female  s/p RNYGB wit hiatal hernia repair with Dr Miguelina Triana 7/14/2020   Initial:270  Current:170  EWL: (Weight loss is ahead of schedule at this post surgical period )  Riky: 155  Current BMI is Body mass index is 26 47 kg/m²      · Tolerating a regular diet-yes  · Eating at least 60 grams of protein per day-yes  · Following 30/60 minute rule with liquids-yes  · Drinking at least 64 ounces of fluid per day-yes  · Drinking carbonated beverages-no  · Sufficient exercise-gym twice a week  · Using NSAIDs regularly-no  · Using nicotine-no  · Using alcohol-no  · Supplements: enrico mvi + calcium + b12 + b complex      The following portions of the patient's history were "reviewed and updated as appropriate: allergies, current medications, past family history, past medical history, past social history, past surgical history and problem list     Review of Systems   Constitutional: Negative  Respiratory: Negative  Gastrointestinal: Positive for abdominal pain (occasional ) and constipation  Negative for diarrhea, nausea and vomiting  Neurological: Negative  Psychiatric/Behavioral: Negative            Objective:    /66   Pulse 75   Temp 98 °F (36 7 °C) (Tympanic)   Ht 5' 7 2\" (1 707 m)   Wt 77 1 kg (170 lb)   LMP  (LMP Unknown)   BMI 26 47 kg/m²      Physical Exam        "

## 2023-06-27 ENCOUNTER — TELEPHONE (OUTPATIENT)
Dept: BARIATRICS | Facility: CLINIC | Age: 53
End: 2023-06-27

## 2023-06-27 NOTE — TELEPHONE ENCOUNTER
Spoke to pt to ensure they knew their EGD scheduled for 7/5  Reminded patient that the hospital will call the day before with a time and not to eat or drink anything after midnight and to have   Please also remove any ear/facial piercings prior to the procedure

## 2023-07-02 RX ORDER — SODIUM CHLORIDE 9 MG/ML
125 INJECTION, SOLUTION INTRAVENOUS CONTINUOUS
Status: CANCELLED | OUTPATIENT
Start: 2023-07-02

## 2023-07-05 ENCOUNTER — ANESTHESIA EVENT (OUTPATIENT)
Dept: GASTROENTEROLOGY | Facility: HOSPITAL | Age: 53
End: 2023-07-05

## 2023-07-05 ENCOUNTER — ANESTHESIA (OUTPATIENT)
Dept: GASTROENTEROLOGY | Facility: HOSPITAL | Age: 53
End: 2023-07-05

## 2023-07-05 ENCOUNTER — HOSPITAL ENCOUNTER (OUTPATIENT)
Dept: GASTROENTEROLOGY | Facility: HOSPITAL | Age: 53
Setting detail: OUTPATIENT SURGERY
Discharge: HOME | End: 2023-07-05
Attending: SURGERY
Payer: COMMERCIAL

## 2023-07-05 VITALS
HEART RATE: 76 BPM | OXYGEN SATURATION: 98 % | RESPIRATION RATE: 16 BRPM | WEIGHT: 170 LBS | DIASTOLIC BLOOD PRESSURE: 55 MMHG | BODY MASS INDEX: 26.68 KG/M2 | HEIGHT: 67 IN | TEMPERATURE: 98 F | SYSTOLIC BLOOD PRESSURE: 101 MMHG

## 2023-07-05 DIAGNOSIS — Z98.84 BARIATRIC SURGERY STATUS: ICD-10-CM

## 2023-07-05 PROCEDURE — 88305 TISSUE EXAM BY PATHOLOGIST: CPT | Performed by: PATHOLOGY

## 2023-07-05 PROCEDURE — 43239 EGD BIOPSY SINGLE/MULTIPLE: CPT | Performed by: SURGERY

## 2023-07-05 RX ORDER — SODIUM CHLORIDE 9 MG/ML
125 INJECTION, SOLUTION INTRAVENOUS CONTINUOUS
Status: DISCONTINUED | OUTPATIENT
Start: 2023-07-05 | End: 2023-07-09 | Stop reason: HOSPADM

## 2023-07-05 RX ORDER — PROPOFOL 10 MG/ML
INJECTION, EMULSION INTRAVENOUS AS NEEDED
Status: DISCONTINUED | OUTPATIENT
Start: 2023-07-05 | End: 2023-07-05

## 2023-07-05 RX ADMIN — PROPOFOL 50 MG: 10 INJECTION, EMULSION INTRAVENOUS at 13:13

## 2023-07-05 RX ADMIN — SODIUM CHLORIDE 125 ML/HR: 0.9 INJECTION, SOLUTION INTRAVENOUS at 13:03

## 2023-07-05 RX ADMIN — PROPOFOL 150 MG: 10 INJECTION, EMULSION INTRAVENOUS at 13:11

## 2023-07-05 NOTE — INTERVAL H&P NOTE
H&P reviewed. After examining the patient I find no changes in the patients condition since the H&P had been written.     Vitals:    07/05/23 1248   BP: 97/59   Pulse: 65   Resp: 16   Temp: 98 °F (36.7 °C)   SpO2: 100%

## 2023-07-05 NOTE — ANESTHESIA POSTPROCEDURE EVALUATION
Post-Op Assessment Note    CV Status:  Stable    Pain management: adequate     Mental Status:  Awake   Hydration Status:  Stable   PONV Controlled:  Controlled   Airway Patency:  Patent      Post Op Vitals Reviewed: Yes      Staff: Anesthesiologist         No notable events documented.     BP      Temp      Pulse     Resp      SpO2      /55   Pulse 76   Temp 98 °F (36.7 °C) (Temporal)   Resp 16   Ht 5' 7" (1.702 m)   Wt 77.1 kg (170 lb)   LMP  (LMP Unknown)   SpO2 98%   BMI 26.63 kg/m²

## 2023-07-05 NOTE — ANESTHESIA PREPROCEDURE EVALUATION
Procedure:  EGD    Relevant Problems   GI/HEPATIC   (+) GERD (gastroesophageal reflux disease)   (+) Hiatal hernia      HEMATOLOGY   (+) Iron deficiency anemia, unspecified      MUSCULOSKELETAL   (+) Hiatal hernia      NEURO/PSYCH   (+) Anxiety      Endocrine   (+) PCOS (polycystic ovarian syndrome)      Other   (+) Bariatric surgery status   (+) Overweight (BMI 25.0-29. 9)        Physical Exam    Airway    Mallampati score: II  TM Distance: >3 FB       Dental   No notable dental hx     Cardiovascular  Cardiovascular exam normal    Pulmonary  Pulmonary exam normal     Other Findings        Anesthesia Plan  ASA Score- 2     Anesthesia Type- IV sedation with anesthesia with ASA Monitors. Additional Monitors:   Airway Plan:           Plan Factors-Exercise tolerance (METS): >4 METS. Chart reviewed. Patient is not a current smoker. Obstructive sleep apnea risk education given perioperatively. Induction- intravenous. Postoperative Plan-     Informed Consent- Anesthetic plan and risks discussed with patient.

## 2023-07-07 ENCOUNTER — OFFICE VISIT (OUTPATIENT)
Dept: BARIATRICS | Facility: CLINIC | Age: 53
End: 2023-07-07

## 2023-07-07 VITALS — WEIGHT: 173.3 LBS | BODY MASS INDEX: 27.14 KG/M2

## 2023-07-07 DIAGNOSIS — E66.3 OVERWEIGHT (BMI 25.0-29.9): ICD-10-CM

## 2023-07-07 DIAGNOSIS — Z98.84 BARIATRIC SURGERY STATUS: Primary | ICD-10-CM

## 2023-07-07 PROCEDURE — 88305 TISSUE EXAM BY PATHOLOGIST: CPT | Performed by: PATHOLOGY

## 2023-07-07 PROCEDURE — RECHECK: Performed by: DIETITIAN, REGISTERED

## 2023-07-07 NOTE — PROGRESS NOTES
Bariatric Follow Up Nutrition Note    Type of surgery  Gastric bypass: laparoscopic  Surgery Date: 7/14/2020  3 years post-op  Surgeon: Dr. Zhang Hands  48 y.o.  female  Wt 78.6 kg (173 lb 4.8 oz)   LMP  (LMP Unknown)   BMI 27.14 kg/m²    Wt Readings from Last 3 Encounters:   07/07/23 78.6 kg (173 lb 4.8 oz)   07/05/23 77.1 kg (170 lb)   06/22/23 77.1 kg (170 lb)       Gilbert Connor Equation:    Estimated calories for weight maintenance:  1703kcal/day  ( @ sedentary activity level)  1951kcal/day for weight maintenance @ light activity level   Estimated calories for weight loss 1451 kcal/day (1# per wk wt loss @ light activity factor)  Estimated protein needs 73.3-110g/day (1.0-1.5 gms/kg IBW)  Estimated fluid needs 2199-2566ml/day (30-35 ml/kg IBW)    12/10/2019 initial office weight= 263.5lbs  7/14/2020 Weight on Day of Weight Loss Surgery: 254.3lbs  Weight in (lb) to have BMI = 25: 161.2lbs  Pre-Op Excess Wt: 102.3lbs  7/1/2022 post-op ivone= 155.5lbs  Post-Op Wt Loss: 90.2#/ 88% EBWL in 3 year(s)    Review of History and Medications   Past Medical History:   Diagnosis Date   • Anemia     "mild"   • Anxiety     occas   • Colon polyp    • GERD (gastroesophageal reflux disease)    • Heart murmur     "with pregnancy than went away"   • Hiatal hernia    • History of kidney stones    • History of transfusion     "years ago after a miscarriage"   • Obesity    • PCOS (polycystic ovarian syndrome)    • Shortness of breath     occas   • Wears glasses      Past Surgical History:   Procedure Laterality Date   • BARIATRIC SURGERY     • COLONOSCOPY  11/2019   • DILATION AND CURETTAGE OF UTERUS      D&E   • EGD     • ESSURE TUBAL LIGATION     • GALLBLADDER SURGERY  08/12/2022   • GA LAPS GSTR RSTCV PX W/BYP DAVID-EN-Y LIMB <150 CM N/A 07/14/2020    Procedure: BYPASS GASTRIC DAVID-EN-Y LAPAROSCOPIC W ROBOTICS W/ INTRAOPERATIVE EGD;  Surgeon: Jadiel Bhagat MD;  Location: [facility]; Service: Bariatrics   • UPPER GASTROINTESTINAL ENDOSCOPY  11/2019     Social History     Socioeconomic History   • Marital status: /Civil Union     Spouse name: Not on file   • Number of children: Not on file   • Years of education: Not on file   • Highest education level: Not on file   Occupational History   • Not on file   Tobacco Use   • Smoking status: Former   • Smokeless tobacco: Never   Vaping Use   • Vaping Use: Former   Substance and Sexual Activity   • Alcohol use: No   • Drug use: No   • Sexual activity: Yes     Partners: Male     Birth control/protection: Female Sterilization     Comment: [place] 2010   Other Topics Concern   • Not on file   Social History Narrative   • Not on file     Social Determinants of Health     Financial Resource Strain: Not on file   Food Insecurity: Not on file   Transportation Needs: Not on file   Physical Activity: Not on file   Stress: Not on file   Social Connections: Not on file   Intimate Partner Violence: Not on file   Housing Stability: Not on file       Current Outpatient Medications:   •  albuterol (PROVENTIL HFA,VENTOLIN HFA) 90 mcg/act inhaler, , Disp: , Rfl:   •  albuterol (PROVENTIL HFA,VENTOLIN HFA) 90 mcg/act inhaler, albuterol sulfate HFA 90 mcg/actuation aerosol inhaler  INHALE 2 PUFFS BY MOUTH EVERY 4 HOURS AS NEEDED, Disp: , Rfl:   •  albuterol (PROVENTIL HFA,VENTOLIN HFA) 90 mcg/act inhaler, Inhale 2 puffs every 6 (six) hours as needed for wheezing, Disp: 18 g, Rfl: 0  •  cetirizine (ZyrTEC) 10 mg tablet, Take 10 mg by mouth daily as needed , Disp: , Rfl:   •  cetirizine (ZyrTEC) 10 mg tablet, Daily, Disp: , Rfl:   •  Cholecalciferol (VITAMIN D3) 1.25 MG (05113 UT) CAPS, Take 50,000 Units by mouth once a week , Disp: , Rfl:   •  estradiol (ESTRACE) 0.1 mg/g vaginal cream, Insert 1 g into the vagina 2 (two) times a week (Patient not taking: Reported on 6/9/2022), Disp: 42.5 g, Rfl: 3  •  ferrous sulfate 325 (65 Fe) mg tablet, Take 325 mg by mouth daily , Disp: , Rfl:   •  fluconazole (DIFLUCAN) 150 mg tablet, Take 150 mg by mouth daily, Disp: , Rfl:   •  fluticasone (FLONASE) 50 mcg/act nasal spray, SPRAY 1 SPRAY INTO EACH NOSTRIL EVERY DAY, Disp: , Rfl:   •  glucose blood test strip, , Disp: , Rfl:   •  glucose blood test strip, OneTouch Verio test strips  USE 1 STRIP 3 TIMES A DAY AS NEEDED, Disp: , Rfl:   •  Magnesium 250 MG TABS, Take 250 mg by mouth daily , Disp: , Rfl:   •  metroNIDAZOLE (METROGEL) 0.75 % vaginal gel, metronidazole 0.75 % vaginal gel (Patient not taking: Reported on 7/1/2022), Disp: , Rfl:   •  Multiple Vitamins-Minerals (CENTRUM PO), Take 1 tablet by mouth daily Bariatric vitamin, Disp: , Rfl:   •  omeprazole (PriLOSEC) 20 mg delayed release capsule, Take 1 capsule (20 mg total) by mouth daily, Disp: 90 capsule, Rfl: 1  •  OneTouch Verio test strip, , Disp: , Rfl:   •  polyethylene glycol (GLYCOLAX) 17 GM/SCOOP powder, Take 17 g by mouth daily, Disp: 510 g, Rfl: 1  •  vitamin A 3 MG (61074 UT) capsule, Take 1 capsule (10,000 Units total) by mouth daily (Patient not taking: Reported on 6/22/2023), Disp: 90 capsule, Rfl: 0  •  vitamin B-12 (VITAMIN B-12) 1,000 mcg tablet, Take 1 tablet (1,000 mcg total) by mouth daily, Disp: 30 tablet, Rfl: 3  No current facility-administered medications for this visit.     Facility-Administered Medications Ordered in Other Visits:   •  sodium chloride 0.9 % infusion, 125 mL/hr, Intravenous, Continuous, Santo Alaniz DO, Last Rate: 125 mL/hr at 07/05/23 1307, Restarted at 07/05/23 1317    Food Intake and Lifestyle Assessment   Food Intake Assessment completed via usual diet recall-\  No significant diet changes since last office appointment  6am:  Protein shake: Ensure Max Protein  Breakfast: 11am: egg, english muffin whole grain, piece of meat/sausage  Snack: none   Lunch: 3:30pm: packs: sandwich: 2 slice 409 bread, turkey and cheese and hummus and carrots  Snack: none  Dinner: 8pm:  cooks: boiled or baked chicken or fish mostly. Sometimes vegetable-green beans, potato or corn  Snack: Ratio yogurt (25g pro)- sometimes, not every day  Lays down in bed at 11:30pm  Beverage intake: zero sugar gatorade/powerade, gold peak zero sugar iced tea, water with lime  Diet texture/stage: regular  Protein supplement: Ensure Max Protein once/day in the morning  Estimated protein intake per day: 90-120g/day  Estimated fluid intake per day: >64oz  Meals eaten away from home: rare  Typical meal pattern: 3 meals per day and 1 snacks per day  Eating Behaviors: Appropriate diet advancement, Appropriate portion sizes and Does not drink with meals and waits 30-minutes after meal before resuming drinking    Vitamin and Mineral regimen: vitamin B12, B complex, Iron, Vit D 50,000 IU once weekly, switched to Centrum adult MVI twice daily. History of Iron def anemia - completed iron infusions    Food allergies or intolerances: none noted  Cultural or Scientologist considerations: none noted    Physical Assessment  Nutrition Related Findings:  Was going to the gym 3-4 times a week  1-2 days a week currently:  Elliptical 30 minutes, sometimes resistance, there for 45-60 mins each time  Most of 12 hour shift is spent walkin-36870 steps during average 12 hr shift  At home: 5000 steps  Just dance video games  [employer]  Current physical limitations: none noted    Psychosocial Assessment   Support systems: friend(s) relative(s)  Socioeconomic factors: 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        Patient educated and handouts provided. Adequate hydration  Expected weight loss  Weight loss plateaus/ possibility of weight regain  Exercise  Nutrition considerations after surgery  Protein supplements  Appropriate carbohydrate, protein, and fat intake, and food/fluid choices to maximize safe weight loss, nutrient intake, and tolerance   Dietary and lifestyle changes  Possible problems with poor eating habits  Techniques for self monitoring and keeping daily food journal  Potential for food intolerance after surgery, and ways to deal with them including: lactose intolerance, nausea, reflux, vomiting, diarrhea, food intolerance, appetite changes, gas  Vitamin / Mineral supplementation of Multivitamin with minerals, Calcium, Vitamin B12 and Iron  Reviewed dietary tips to decrease acid reflux/gerd symptoms. Provided pt with updated vitamin page in manual and reviewd OTC vitamin needs with pt.     Education provided to: patient    Barriers to learning: No barriers identified    Readiness to change: action    Comprehension: verbalizes understanding     Expected Compliance: excellent    Evaluation/Monitoring   Eating pattern as discussed Tolerance of nutrition prescription Body weight Lab values Physical activity Bowel pattern    Goals  Food journal, Exercise 30 minutes 5 times per week, Eat 3 meals per day and Eliminate mindless snacking     Time Spent:   30 Minutes

## 2023-07-17 ENCOUNTER — TELEPHONE (OUTPATIENT)
Dept: BARIATRICS | Facility: CLINIC | Age: 53
End: 2023-07-17

## 2023-08-03 ENCOUNTER — HOSPITAL ENCOUNTER (OUTPATIENT)
Dept: RADIOLOGY | Facility: HOSPITAL | Age: 53
Discharge: HOME | End: 2023-08-03
Attending: SURGERY
Payer: COMMERCIAL

## 2023-08-03 DIAGNOSIS — Z98.84 BARIATRIC SURGERY STATUS: ICD-10-CM

## 2023-08-03 PROCEDURE — 74240 X-RAY XM UPR GI TRC 1CNTRST: CPT

## 2023-08-16 ENCOUNTER — TELEPHONE (OUTPATIENT)
Dept: BARIATRICS | Facility: CLINIC | Age: 53
End: 2023-08-16

## 2023-09-15 ENCOUNTER — OFFICE VISIT (OUTPATIENT)
Dept: BARIATRICS | Facility: CLINIC | Age: 53
End: 2023-09-15
Payer: COMMERCIAL

## 2023-09-15 VITALS
SYSTOLIC BLOOD PRESSURE: 106 MMHG | DIASTOLIC BLOOD PRESSURE: 60 MMHG | HEART RATE: 73 BPM | BODY MASS INDEX: 27.47 KG/M2 | TEMPERATURE: 98.5 F | WEIGHT: 175 LBS | HEIGHT: 67 IN | OXYGEN SATURATION: 94 %

## 2023-09-15 DIAGNOSIS — Z98.84 BARIATRIC SURGERY STATUS: ICD-10-CM

## 2023-09-15 DIAGNOSIS — K21.9 ACID REFLUX: ICD-10-CM

## 2023-09-15 DIAGNOSIS — K21.9 GERD (GASTROESOPHAGEAL REFLUX DISEASE): Primary | ICD-10-CM

## 2023-09-15 PROCEDURE — 99213 OFFICE O/P EST LOW 20 MIN: CPT | Performed by: SURGERY

## 2023-09-15 RX ORDER — ERGOCALCIFEROL 1.25 MG/1
CAPSULE ORAL
COMMUNITY
Start: 2023-09-07

## 2023-09-15 RX ORDER — OMEPRAZOLE 20 MG/1
20 CAPSULE, DELAYED RELEASE ORAL DAILY
Qty: 90 CAPSULE | Refills: 2 | Status: SHIPPED | OUTPATIENT
Start: 2023-09-15 | End: 2023-12-14

## 2023-09-15 NOTE — PROGRESS NOTES
OFFICE VISIT - BARIATRIC SURGERY  Tara De Dios 48 y.o. female MRN: [id number]  Unit/Bed#:  Encounter: [id number]      HPI:  Abraham Rosario is a 48 y.o. female status post RNYGB wit hiatal hernia repair with Dr. Luz Maria Flower 7/14/2020. c/o reflux, which she states started after her cholecystectomy this past September. She reports intermittent heartburn and abdominal burning that seems to be more frequent. Symptoms worse with certain greasy foods     Subjective     Symptoms have improved since her EGD/upperGI as she was started on omeprazole. Studies were otherwise normal.       Review of Systems   All other systems reviewed and are negative.       Historical Information   Past Medical History:   Diagnosis Date   • Anemia     "mild"   • Anxiety     occas   • Colon polyp    • GERD (gastroesophageal reflux disease)    • Heart murmur     "with pregnancy than went away"   • Hiatal hernia    • History of kidney stones    • History of transfusion     "years ago after a miscarriage"   • Obesity    • PCOS (polycystic ovarian syndrome)    • Shortness of breath     occas   • Wears glasses      Past Surgical History:   Procedure Laterality Date   • BARIATRIC SURGERY     • COLONOSCOPY  11/2019   • DILATION AND CURETTAGE OF UTERUS      D&E   • EGD     • ESSURE TUBAL LIGATION     • GALLBLADDER SURGERY  08/12/2022   • MI LAPS GSTR RSTCV PX W/BYP DAVID-EN-Y LIMB <150 CM N/A 07/14/2020    Procedure: BYPASS GASTRIC DAVID-EN-Y LAPAROSCOPIC W ROBOTICS W/ INTRAOPERATIVE EGD;  Surgeon: Andrez Barros MD;  Location: [facility];  Service: Bariatrics   • UPPER GASTROINTESTINAL ENDOSCOPY  11/2019     Social History   Social History     Substance and Sexual Activity   Alcohol Use No     Social History     Substance and Sexual Activity   Drug Use No     Social History     Tobacco Use   Smoking Status Former   • Packs/day: 0.50   • Types: Cigarettes   Smokeless Tobacco Never       Objective       Current Vitals:   Blood Pressure: 106/60 (09/15/23 1112)  Pulse: 73 (09/15/23 1112)  Temperature: 98.5 °F (36.9 °C) (09/15/23 1112)  Temp Source: Tympanic (09/15/23 1112)  Height: 5' 7.2" (170.7 cm) (09/15/23 1112)  Weight - Scale: 79.4 kg (175 lb) (09/15/23 1112)  SpO2: 94 % (09/15/23 1112)    Invasive Devices     None                 Physical Exam  HENT:      Head: Normocephalic. Cardiovascular:      Rate and Rhythm: Normal rate and regular rhythm. Pulses: Normal pulses. Pulmonary:      Effort: Pulmonary effort is normal.   Neurological:      General: No focal deficit present. Mental Status: She is alert and oriented to person, place, and time. Pathology, and Other Studies: I have personally reviewed pertinent reports. Assessment/PLAN:    Abraham Rosario is a 48 y.o. female status post RYGB and hiatal hernia repair in 2020 who presented on 6/2023 with concerns for heartburn and refulx. Underwent EGD and upperGI which were normal and was started on omeprazole which improved her symptoms. Follow-up with PA in 3 months  Omeprazole for 6 months and then wean off    UGI  UPPER GI SERIES SINGLE CONTRAST     INDICATION:  Z98.84: Bariatric surgery status.     COMPARISON:  None     IMAGES:  26     FLUOROSCOPY TIME:  3.4 minutes     TECHNIQUE:  The patient was given barium by mouth and images of the esophagus, stomach, and small bowel were obtained.     FINDINGS:     The esophagus is normal in caliber. Esophageal motility is normal and emptying of contrast from the esophagus is prompt. There is no mucosal mass, ulceration or fold thickening identified.     Expected postoperative changes following gastric bypass surgery. The gastric mucosa is normal.  No penetrating ulcers or masses.     Contrast empties promptly into the jejunum.  The jejunum is normal in caliber.     Gastroesophageal reflux was not observed.     There is no hiatal hernia.     IMPRESSION:     Unremarkable upper GI series.         EGD  DATE OF SERVICE:  7/05/23     PHYSICIAN(S):  Attending:   Alex Montelongo MD      Fellow:   Jo Caban DO         INDICATION:  Bariatric surgery status     POST-OP DIAGNOSIS:  See the impression below.     PREPROCEDURE:  Informed consent was obtained for the procedure, including sedation. Risks of perforation, hemorrhage, adverse drug reaction and aspiration were discussed. The patient was placed in the left lateral decubitus position.     Patient was explained about the risks and benefits of the procedure. Risks including but not limited to bleeding, infection, and perforation were explained in detail. Also explained about less than 100% sensitivity with the exam and other alternatives.     PROCEDURE: EGD     DETAILS OF PROCEDURE:   Patient was taken to the procedure room where a time out was performed to confirm correct patient and correct procedure. The patient underwent monitored anesthesia care, which was administered by an anesthesia professional. The patient's blood pressure, heart rate, level of consciousness, respirations and oxygen were monitored throughout the procedure. The scope was advanced to the second part of the duodenum. Retroflexion was performed in the fundus. The patient experienced no blood loss. The procedure was not difficult. The patient tolerated the procedure well.  There were no apparent adverse events.      ANESTHESIA INFORMATION:  ASA: II  Anesthesia Type: IV Sedation with Anesthesia     MEDICATIONS:  No administrations occurring from 1309 to 1311 on 07/05/23         FINDINGS:  • Previous gastric bypass surgery  • Regular Z-line 39 cm from the incisors  • Atrophic and cobblestone mucosa in the gastric pouch; performed cold forceps biopsy        SPECIMENS:  ID Type Source Tests Collected by Time Destination   A :    Stomach     7/5/2023 1315              IMPRESSION:  • Previous gastric bypass surgery  • Atrophic and cobblestone mucosa in the gastric pouch; performed cold forceps biopsy        RECOMMENDATION:    There is no recommended follow-up for this procedure.                  Philip Mclain MD           Pathology from EGD   Final Diagnosis   A. Stomach, biopsy:  -Gastric oxyntic mucosa with no significant histopathologic change.  -Negative for Helicobacter pylori organisms on H&E stain.              MANOMETRY/pH Study        GASTRIC EMPTYING STUDY      --------------------------------------------------------------------    Follow-up with PA in 3 months  Omeprazole for 6 months              Jaimee Stacy MD  Bariatric Surgery  9/15/2023  11:20 AM

## 2023-12-15 ENCOUNTER — TELEPHONE (OUTPATIENT)
Dept: BARIATRICS | Facility: CLINIC | Age: 53
End: 2023-12-15

## 2024-02-26 ENCOUNTER — TELEPHONE (OUTPATIENT)
Age: 54
End: 2024-02-26

## 2024-02-26 NOTE — TELEPHONE ENCOUNTER
Pt is asking if she should be fasting or non fasting for her blood work appointment. Pt can be reached at [phone]

## 2024-03-08 ENCOUNTER — OFFICE VISIT (OUTPATIENT)
Dept: BARIATRICS | Facility: CLINIC | Age: 54
End: 2024-03-08
Payer: COMMERCIAL

## 2024-03-08 VITALS
TEMPERATURE: 97.6 F | HEART RATE: 78 BPM | HEIGHT: 67 IN | WEIGHT: 181 LBS | BODY MASS INDEX: 28.41 KG/M2 | SYSTOLIC BLOOD PRESSURE: 108 MMHG | DIASTOLIC BLOOD PRESSURE: 74 MMHG

## 2024-03-08 DIAGNOSIS — Z98.84 BARIATRIC SURGERY STATUS: ICD-10-CM

## 2024-03-08 DIAGNOSIS — K21.9 ACID REFLUX: ICD-10-CM

## 2024-03-08 DIAGNOSIS — Z48.815 ENCOUNTER FOR SURGICAL AFTERCARE FOLLOWING SURGERY OF DIGESTIVE SYSTEM: Primary | ICD-10-CM

## 2024-03-08 DIAGNOSIS — K91.2 POSTSURGICAL MALABSORPTION: ICD-10-CM

## 2024-03-08 DIAGNOSIS — E66.3 OVERWEIGHT: ICD-10-CM

## 2024-03-08 DIAGNOSIS — K21.9 GERD (GASTROESOPHAGEAL REFLUX DISEASE): ICD-10-CM

## 2024-03-08 PROCEDURE — 99213 OFFICE O/P EST LOW 20 MIN: CPT | Performed by: PHYSICIAN ASSISTANT

## 2024-03-08 RX ORDER — OMEPRAZOLE 20 MG/1
20 CAPSULE, DELAYED RELEASE ORAL DAILY
Qty: 90 CAPSULE | Refills: 2 | Status: SHIPPED | OUTPATIENT
Start: 2024-03-08 | End: 2024-06-06

## 2024-03-08 NOTE — PROGRESS NOTES
"Assessment/Plan:       Diagnoses and all orders for this visit:    Encounter for surgical aftercare following surgery of digestive system    Bariatric surgery status    Postsurgical malabsorption    Overweight    GERD (gastroesophageal reflux disease)          s/p RNYGB wit hiatal hernia repair with Dr. Grover Springer 7/14/2020 . Here for reflux follow up. Was seen by Dr Grover springer for testing, Symptoms have improved since her EGD/upperGI as she was started on omeprazole. Studies were otherwise normal. Her reflux is intermittent and she is only taking omeprazole prn.     She is concerned with some weight regain. She is exercising and going to the gym at least twice a week. Her diet is overall the same and she tries to avoid sugars but is feeling more hungry. She is drinking at least 1 protein shake a day. Advised at least 60 g of protein.   She is interested in our [facility] program - will refer     Follow up 6 months for annual. Get labs done.     Subjective:      Patient ID: Stephany De Dios is a 53 y.o. female.    HPI follow up reflux     The following portions of the patient's history were reviewed and updated as appropriate: allergies, current medications, past family history, past medical history, past social history, past surgical history, and problem list.    Review of Systems   Constitutional: Negative.    Respiratory: Negative.     Cardiovascular: Negative.    Gastrointestinal: Negative.    Neurological: Negative.    Psychiatric/Behavioral: Negative.           Objective:      /74   Pulse 78   Temp 97.6 °F (36.4 °C) (Tympanic)   Ht 5' 7.2\" (1.707 m)   Wt 82.1 kg (181 lb)   LMP  (LMP Unknown)   BMI 28.18 kg/m²          Physical Exam  Vitals and nursing note reviewed.   Constitutional:       Appearance: Normal appearance.   HENT:      Head: Normocephalic and atraumatic.   Eyes:      Extraocular Movements: Extraocular movements intact.      Pupils: Pupils are equal, round, and reactive to light.   Cardiovascular:    "   Rate and Rhythm: Normal rate.   Pulmonary:      Effort: Pulmonary effort is normal.   Musculoskeletal:         General: Normal range of motion.      Cervical back: Normal range of motion.   Skin:     General: Skin is warm and dry.   Neurological:      General: No focal deficit present.      Mental Status: She is alert and oriented to person, place, and time.   Psychiatric:         Mood and Affect: Mood normal.

## 2024-03-14 ENCOUNTER — OFFICE VISIT (OUTPATIENT)
Dept: BARIATRICS | Facility: CLINIC | Age: 54
End: 2024-03-14
Payer: COMMERCIAL

## 2024-03-14 VITALS
WEIGHT: 182 LBS | RESPIRATION RATE: 16 BRPM | HEIGHT: 66 IN | TEMPERATURE: 97.4 F | BODY MASS INDEX: 29.25 KG/M2 | DIASTOLIC BLOOD PRESSURE: 63 MMHG | SYSTOLIC BLOOD PRESSURE: 100 MMHG | HEART RATE: 65 BPM

## 2024-03-14 DIAGNOSIS — E66.3 OVERWEIGHT (BMI 25.0-29.9): Primary | ICD-10-CM

## 2024-03-14 DIAGNOSIS — Z51.81 MEDICATION MONITORING ENCOUNTER: ICD-10-CM

## 2024-03-14 DIAGNOSIS — R73.09 ABNORMAL GLUCOSE: ICD-10-CM

## 2024-03-14 DIAGNOSIS — E28.2 PCOS (POLYCYSTIC OVARIAN SYNDROME): ICD-10-CM

## 2024-03-14 PROCEDURE — 99214 OFFICE O/P EST MOD 30 MIN: CPT | Performed by: PHYSICIAN ASSISTANT

## 2024-03-14 NOTE — PROGRESS NOTES
Assessment/Plan:    Overweight (BMI 25.0-29.9)  -Discussed options of healthycore, very low calorie diet and conservative plan and the role of weight loss medications.  -Severity: s/p RNYGB wit hiatal hernia repair with Dr. Grover Loo 7/14/2020   -Initial weight loss goal of 5-10% weight loss for improved health  -Screening labs and records reviewed from prior-due for labs.        -Patient is interested in pursuing conservative plan      Goals:  -Food log (ie.) www.[employer],loseit.com-less than 1500 calories a day. Had her goal set as higher prior.    - To drink at least 64oz of water daily.No sugary beverages.  -recommend goal 3 x a week exercise with both cardio/weight    Discussed medication options.  Would avoid topmax due to renal stones.  Will get EKG and to start on phentermine. She was on prior wtihout side effects. Side effects were discussed and medication contract was signed    Initial Weight:182  Goal Weight:165        PCOS (polycystic ovarian syndrome)  To recheck fasting blood sugar and A1C      Follow up in approximately  5 week nurse visit and 4 months  with Non-Surgical Physician/Advanced Practitioner.  I have spent a total time of 40 minutes on 03/14/24 in caring for this patient including Diagnostic results, Prognosis, Risks and benefits of tx options, Instructions for management, Patient and family education, Importance of tx compliance, Risk factor reductions, Impressions, Counseling / Coordination of care, Documenting in the medical record, Reviewing / ordering tests, medicine, procedures  , and Obtaining or reviewing history  .      Diagnoses and all orders for this visit:    Overweight (BMI 25.0-29.9)  -     Lipid panel; Future  -     Hemoglobin A1C; Future  -     ECG 12 lead; Future    Abnormal glucose  -     Lipid panel; Future  -     Hemoglobin A1C; Future  -     ECG 12 lead; Future    Medication monitoring encounter  -     ECG 12 lead; Future    PCOS (polycystic  "ovarian syndrome)          Subjective:   Chief Complaint   Patient presents with    Consult     MWM-Consult;Gw-165lb       Patient ID: Stephany De Dios  is a 53 y.o. female with excess weight/obesity here to pursue weight management.    Past Medical History:   Diagnosis Date    Anemia     \"mild\"    Anxiety     occas    Colon polyp     GERD (gastroesophageal reflux disease)     Heart murmur     \"with pregnancy than went away\"    Hiatal hernia     History of kidney stones     History of transfusion     \"years ago after a miscarriage\"    Obesity     PCOS (polycystic ovarian syndrome)     Shortness of breath     occas    Wears glasses        HPI: Here for MWM consult      Severity: s/p RNYGB wit hiatal hernia repair with Dr. Grover Loo 7/14/2020  . She has noticed weight regain over lthe last 2 years. She gained about 16 lb in the last 3 years. She does feel she is hungry often .Tries to drink water to help.  Met with RD about 7 months ago     Riky 155lb  Onset:  regain over last 2 years    Modifiers: Diet and Exercise, Physician Supervised Weight Loss Program, Commercial Weight Loss Programs-ie. Weight Watchers, [employer], [employer], etc., and Prescription Weight Loss Medications  Contributing factors:  food intake amounts may have increased over time.    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  She   Patient Active Problem List    Diagnosis Date Noted    GERD (gastroesophageal reflux disease)     PCOS (polycystic ovarian syndrome)     Hiatal hernia     Anxiety     Vulvar lesion 06/09/2022    Bacterial vaginosis 06/09/2022    Low hemoglobin 05/10/2021    Bariatric surgery status 05/10/2021    Bruising 05/10/2021    Iron deficiency anemia, unspecified 05/10/2021    Family history of ovarian cancer 03/12/2021    Folliculitis 03/12/2021    Vaginal atrophy 03/12/2021    Post-nasal drip 01/06/2021    Nocturnal hypoxia 01/06/2021    Overweight (BMI 25.0-29.9) 01/06/2021    Preoperative cardiovascular examination 06/03/2020    Left sided abdominal pain 11/01/2019    Gastritis without bleeding 11/01/2019    Change in bowel habit 11/01/2019    Change in stool 11/01/2019    Nausea 11/01/2019    Calculus of gallbladder without cholecystitis without obstruction 11/01/2019    Elevated lipase 11/01/2019     She  has a past surgical history that includes EGD; Dilation and curettage of uterus; Colonoscopy (11/2019); Upper gastrointestinal endoscopy (11/2019); pr laps gstr rstcv px w/byp mala-en-y limb <150 cm (N/A, 07/14/2020); Bariatric Surgery; Essure tubal ligation; and Gallbladder surgery (08/12/2022).  Her family history includes Diabetes in her maternal grandmother; Hypertension in her mother; No Known Problems in her daughter, daughter, father, half-sister, half-sister, son, and son; Obesity in her mother; Ovarian cancer in her mother.  She  reports that she has quit smoking. Her smoking use included cigarettes. She has never used smokeless tobacco. She reports that she does not drink alcohol and does not use drugs.  Current Outpatient Medications   Medication Sig Dispense Refill    albuterol (PROVENTIL HFA,VENTOLIN HFA) 90 mcg/act inhaler       albuterol (PROVENTIL HFA,VENTOLIN HFA) 90 mcg/act inhaler        albuterol (PROVENTIL HFA,VENTOLIN HFA) 90 mcg/act inhaler Inhale 2 puffs every 6 (six) hours as needed for wheezing 18 g 0    cetirizine (ZyrTEC) 10 mg tablet Take 10 mg by mouth daily as needed      cetirizine (ZyrTEC) 10 mg tablet Daily      Cholecalciferol (VITAMIN D3) 1.25 MG (88392 UT) CAPS Take 50,000 Units by mouth once a week      ergocalciferol (VITAMIN D2) 50,000 units       estradiol (ESTRACE) 0.1 mg/g vaginal cream Insert 1 g into the vagina 2 (two) times a week 42.5 g 3    ferrous sulfate 325 (65 Fe) mg tablet Take 325 mg by mouth daily       fluconazole (DIFLUCAN) 150 mg tablet Take 150 mg by mouth daily      fluticasone (FLONASE) 50 mcg/act nasal spray SPRAY 1 SPRAY INTO EACH NOSTRIL EVERY DAY      glucose blood test strip       glucose blood test strip OneTouch Verio test strips   USE 1 STRIP 3 TIMES A DAY AS NEEDED      Magnesium 250 MG TABS Take 250 mg by mouth daily       metroNIDAZOLE (METROGEL) 0.75 % vaginal gel       Multiple Vitamins-Minerals (CENTRUM PO) Take 1 tablet by mouth daily Bariatric vitamin      omeprazole (PriLOSEC) 20 mg delayed release capsule Take 1 capsule (20 mg total) by mouth daily 90 capsule 2    OneTouch Verio test strip       polyethylene glycol (GLYCOLAX) 17 GM/SCOOP powder Take 17 g by mouth daily 510 g 1    Riboflavin (VITAMIN B2 PO)       vitamin A 3 MG (83323 UT) capsule Take 1 capsule (10,000 Units total) by mouth daily 90 capsule 0    vitamin B-12 (VITAMIN B-12) 1,000 mcg tablet Take 1 tablet (1,000 mcg total) by mouth daily 30 tablet 3     No current facility-administered medications for this visit.     Current Outpatient Medications on File Prior to Visit   Medication Sig    albuterol (PROVENTIL HFA,VENTOLIN HFA) 90 mcg/act inhaler     albuterol (PROVENTIL HFA,VENTOLIN HFA) 90 mcg/act inhaler     albuterol (PROVENTIL HFA,VENTOLIN HFA) 90 mcg/act inhaler Inhale 2 puffs every 6 (six) hours as needed for wheezing    cetirizine (ZyrTEC) 10 mg tablet Take 10 mg by  mouth daily as needed    cetirizine (ZyrTEC) 10 mg tablet Daily    Cholecalciferol (VITAMIN D3) 1.25 MG (20200 UT) CAPS Take 50,000 Units by mouth once a week    ergocalciferol (VITAMIN D2) 50,000 units     estradiol (ESTRACE) 0.1 mg/g vaginal cream Insert 1 g into the vagina 2 (two) times a week    ferrous sulfate 325 (65 Fe) mg tablet Take 325 mg by mouth daily     fluconazole (DIFLUCAN) 150 mg tablet Take 150 mg by mouth daily    fluticasone (FLONASE) 50 mcg/act nasal spray SPRAY 1 SPRAY INTO EACH NOSTRIL EVERY DAY    glucose blood test strip     glucose blood test strip OneTouch Verio test strips   USE 1 STRIP 3 TIMES A DAY AS NEEDED    Magnesium 250 MG TABS Take 250 mg by mouth daily     metroNIDAZOLE (METROGEL) 0.75 % vaginal gel     Multiple Vitamins-Minerals (CENTRUM PO) Take 1 tablet by mouth daily Bariatric vitamin    omeprazole (PriLOSEC) 20 mg delayed release capsule Take 1 capsule (20 mg total) by mouth daily    OneTouch Verio test strip     polyethylene glycol (GLYCOLAX) 17 GM/SCOOP powder Take 17 g by mouth daily    Riboflavin (VITAMIN B2 PO)     vitamin A 3 MG (34699 UT) capsule Take 1 capsule (10,000 Units total) by mouth daily    vitamin B-12 (VITAMIN B-12) 1,000 mcg tablet Take 1 tablet (1,000 mcg total) by mouth daily     No current facility-administered medications on file prior to visit.     She is allergic to penicillins, cephalosporins, and ciprofloxacin..    Review of Systems   Constitutional:  Negative for fever.   Respiratory:  Negative for shortness of breath.    Cardiovascular:  Negative for chest pain and palpitations.   Gastrointestinal:  Negative for abdominal pain, constipation, diarrhea and vomiting.   Genitourinary:  Negative for difficulty urinating.   Musculoskeletal:  Negative for arthralgias and back pain.   Skin:  Negative for rash.   Neurological:  Negative for headaches.   Psychiatric/Behavioral:  Negative for dysphoric mood. The patient is not nervous/anxious.   "      Objective:    /63   Pulse 65   Temp (!) 97.4 °F (36.3 °C)   Resp 16   Ht 5' 6\" (1.676 m)   Wt 82.6 kg (182 lb)   LMP  (LMP Unknown)   BMI 29.38 kg/m²     Physical Exam  Vitals and nursing note reviewed.   Constitutional:       General: She is not in acute distress.     Appearance: She is well-developed. She is obese.   HENT:      Head: Normocephalic and atraumatic.   Eyes:      Conjunctiva/sclera: Conjunctivae normal.   Neck:      Thyroid: No thyromegaly.   Pulmonary:      Effort: Pulmonary effort is normal. No respiratory distress.   Skin:     Findings: No rash (visible).   Neurological:      Mental Status: She is alert and oriented to person, place, and time.   Psychiatric:         Mood and Affect: Mood normal.         Behavior: Behavior normal.         "

## 2024-03-14 NOTE — ASSESSMENT & PLAN NOTE
-Discussed options of healthycore, very low calorie diet and conservative plan and the role of weight loss medications.  -Severity: s/p RNYGB wit hiatal hernia repair with Dr. Grover Loo 7/14/2020   -Initial weight loss goal of 5-10% weight loss for improved health  -Screening labs and records reviewed from prior-due for labs.        -Patient is interested in pursuing conservative plan      Goals:  -Food log (ie.) www.[email or web address],loseit.com-less than 1500 calories a day. Had her goal set as higher prior.    - To drink at least 64oz of water daily.No sugary beverages.  -recommend goal 3 x a week exercise with both cardio/weight    Discussed medication options.  Would avoid topmax due to renal stones.  Will get EKG and to start on phentermine. She was on prior wtihout side effects. Side effects were discussed and medication contract was signed    Initial Weight:182  Goal Weight:165

## 2024-03-18 ENCOUNTER — NURSE TRIAGE (OUTPATIENT)
Age: 54
End: 2024-03-18

## 2024-03-18 DIAGNOSIS — E66.3 OVERWEIGHT: Primary | ICD-10-CM

## 2024-03-18 DIAGNOSIS — E28.2 PCOS (POLYCYSTIC OVARIAN SYNDROME): ICD-10-CM

## 2024-03-18 RX ORDER — PHENTERMINE HYDROCHLORIDE 15 MG/1
15 CAPSULE ORAL EVERY MORNING
Qty: 30 CAPSULE | Refills: 0 | Status: SHIPPED | OUTPATIENT
Start: 2024-03-18

## 2024-03-18 NOTE — TELEPHONE ENCOUNTER
Patient of Ramin.  Patient requesting to be put on Phentermine and Topiramate combo medication instead of just Phentermine.  Please reach out to patient.  [phone]

## 2024-04-18 ENCOUNTER — CLINICAL SUPPORT (OUTPATIENT)
Dept: BARIATRICS | Facility: CLINIC | Age: 54
End: 2024-04-18

## 2024-04-18 ENCOUNTER — TELEPHONE (OUTPATIENT)
Dept: BARIATRICS | Facility: CLINIC | Age: 54
End: 2024-04-18

## 2024-04-18 VITALS
DIASTOLIC BLOOD PRESSURE: 75 MMHG | TEMPERATURE: 96.3 F | RESPIRATION RATE: 16 BRPM | WEIGHT: 178.8 LBS | HEIGHT: 66 IN | BODY MASS INDEX: 28.73 KG/M2 | SYSTOLIC BLOOD PRESSURE: 100 MMHG | HEART RATE: 93 BPM

## 2024-04-18 DIAGNOSIS — E66.3 OVERWEIGHT (BMI 25.0-29.9): ICD-10-CM

## 2024-04-18 DIAGNOSIS — R63.5 ABNORMAL WEIGHT GAIN: Primary | ICD-10-CM

## 2024-04-18 DIAGNOSIS — E28.2 PCOS (POLYCYSTIC OVARIAN SYNDROME): Primary | ICD-10-CM

## 2024-04-18 PROCEDURE — RECHECK

## 2024-04-18 RX ORDER — PHENTERMINE HYDROCHLORIDE 30 MG/1
30 CAPSULE ORAL EVERY MORNING
Qty: 30 CAPSULE | Refills: 2 | Status: SHIPPED | OUTPATIENT
Start: 2024-04-18

## 2024-04-18 NOTE — PROGRESS NOTES
Patient last visit weight:182lb  Patient current visit weight: 178.8    If you are taking phentermine or other oral weight loss medications, are you experiencing any of the following symptoms:  Headache: NO  Blurred Vision: NO  Chest Pain: NO  Palpitations:NO  Insomnia: NO  SPECIFY ORAL MEDICATION AND DOSAGE:  PHENTERMINE    If you are taking an injectable medication,  are you experiencing any of the following symptoms:  Bloating:   Nausea:  Vomiting:   Constipation:   Diarrhea:  SPECIFY INJECTABLE MEDICATION AND CURRENT DOSAGE:      Vitals:    Is BP less than 100/60?NO  Is BP greater than 140/90?NO  Is HR greater than 100?NP  **If yes to any of the above, have patient relax and repeat in 5-10 minutes**    Repeat values:    Is BP less than 100/60?  Is BP greater than 140/90?  Is HR greater than 100?  **If values remain outside of ranges above, please consult provider for next steps**

## 2024-04-18 NOTE — TELEPHONE ENCOUNTER
Patient requesting a dose increase of her Phentermine to be sent to [facility] pharmacy services on file already. Patient tolerated previous dose without any incident.

## 2024-04-26 ENCOUNTER — NURSE TRIAGE (OUTPATIENT)
Age: 54
End: 2024-04-26

## 2024-04-26 NOTE — TELEPHONE ENCOUNTER
"Pt warm transferred from [place] to this CTS-RN. Pt requesting WM lab orders be faxed to [facility] ([phone]). Lab orders faxed at this time.    Reason for Disposition   Information only question and nurse able to answer    Answer Assessment - Initial Assessment Questions  1. REASON FOR CALL or QUESTION: \"What is your reason for calling today?\" or \"How can I best help you?\" or \"What question do you have that I can help answer?\"      Requesting lab orders be faxed    Protocols used: Information Only Call - No Triage-ADULT-[place]    "
Declines

## 2024-04-27 LAB
25(OH)D3+25(OH)D2 SERPL-MCNC: 95 NG/ML (ref 30–100)
ALBUMIN SERPL-MCNC: 4.1 G/DL (ref 3.5–5.7)
ALP SERPL-CCNC: 65 U/L (ref 35–120)
ALT SERPL-CCNC: 19 U/L
ANION GAP SERPL CALCULATED.3IONS-SCNC: 8 MMOL/L (ref 3–11)
AST SERPL-CCNC: 16 U/L
BASOPHILS # BLD AUTO: 0.1 THOU/CMM (ref 0–0.1)
BASOPHILS NFR BLD AUTO: 1 %
BILIRUB SERPL-MCNC: 0.4 MG/DL (ref 0.2–1)
BUN SERPL-MCNC: 16 MG/DL (ref 7–25)
CALCIUM SERPL-MCNC: 9.5 MG/DL (ref 8.5–10.1)
CHLORIDE SERPL-SCNC: 101 MMOL/L (ref 100–109)
CO2 SERPL-SCNC: 30 MMOL/L (ref 21–31)
CREAT SERPL-MCNC: 0.76 MG/DL (ref 0.4–1.1)
CYTOLOGY CMNT CVX/VAG CYTO-IMP: NORMAL
DIFFERENTIAL METHOD BLD: ABNORMAL
EOSINOPHIL # BLD AUTO: 0.2 THOU/CMM (ref 0–0.5)
EOSINOPHIL NFR BLD AUTO: 4 %
ERYTHROCYTE [DISTWIDTH] IN BLOOD BY AUTOMATED COUNT: 12.9 % (ref 12–16)
FERRITIN SERPL-MCNC: 122 NG/ML (ref 11–306.8)
FOLATE SERPL-MCNC: >22.3 NG/ML
GFR/BSA.PRED SERPLBLD CYS-BASED-ARV: 94 ML/MIN/{1.73_M2}
GLUCOSE SERPL-MCNC: 81 MG/DL (ref 65–99)
HCT VFR BLD AUTO: 34.8 % (ref 35–43)
HGB BLD-MCNC: 11.6 G/DL (ref 11.5–14.5)
IRON SATN MFR SERPL: 25 % (ref 20–50)
IRON SERPL-MCNC: 84 UG/DL (ref 50–212)
LYMPHOCYTES # BLD AUTO: 1.7 THOU/CMM (ref 1–3)
LYMPHOCYTES NFR BLD AUTO: 25 %
MCH RBC QN AUTO: 29.9 PG (ref 26–34)
MCHC RBC AUTO-ENTMCNC: 33.4 G/DL (ref 32–37)
MCV RBC AUTO: 89 FL (ref 80–100)
MONOCYTES # BLD AUTO: 0.5 THOU/CMM (ref 0.3–1)
MONOCYTES NFR BLD AUTO: 8 %
NEUTROPHILS # BLD AUTO: 4.3 THOU/CMM (ref 1.8–7.8)
NEUTROPHILS NFR BLD AUTO: 62 %
PLATELET # BLD AUTO: 272 THOU/CMM (ref 140–350)
PMV BLD REES-ECKER: 7.5 FL (ref 7.5–11.3)
POTASSIUM SERPL-SCNC: 4.1 MMOL/L (ref 3.5–5.2)
PROT SERPL-MCNC: 7.6 G/DL (ref 6.3–8.3)
PTH-INTACT SERPL-MCNC: 42.3 PG/ML (ref 12–88)
RBC # BLD AUTO: 3.9 MILL/CMM (ref 3.7–4.7)
SODIUM SERPL-SCNC: 139 MMOL/L (ref 135–145)
TIBC SERPL-MCNC: 337 UG/DL (ref 260–430)
TRANSFERRIN SERPL-MCNC: 241 MG/DL (ref 203–362)
VIT B12 SERPL-MCNC: 4472 PG/ML (ref 180–914)
WBC # BLD AUTO: 6.8 THOU/CMM (ref 4–10)

## 2024-04-28 LAB — ZINC SERPL-MCNC: 93.7 UG/DL

## 2024-04-29 LAB
RETINYL PALMITATE SERPL-MCNC: <0.02 MG/L
T GONDII AB SER-IMP: NORMAL
VIT A FREE SERPL-MCNC: 0.55 MG/L
VIT B1 BLD-SCNC: 155 NMOL/L

## 2024-07-18 DIAGNOSIS — E66.3 OVERWEIGHT (BMI 25.0-29.9): ICD-10-CM

## 2024-07-18 DIAGNOSIS — E28.2 PCOS (POLYCYSTIC OVARIAN SYNDROME): ICD-10-CM

## 2024-07-18 RX ORDER — PHENTERMINE HYDROCHLORIDE 30 MG/1
30 CAPSULE ORAL EVERY MORNING
Qty: 30 CAPSULE | Refills: 0 | Status: CANCELLED | OUTPATIENT
Start: 2024-07-18

## 2024-07-19 DIAGNOSIS — E28.2 PCOS (POLYCYSTIC OVARIAN SYNDROME): ICD-10-CM

## 2024-07-19 DIAGNOSIS — E66.3 OVERWEIGHT (BMI 25.0-29.9): ICD-10-CM

## 2024-07-19 RX ORDER — PHENTERMINE HYDROCHLORIDE 30 MG/1
30 CAPSULE ORAL EVERY MORNING
Qty: 30 CAPSULE | Refills: 2 | Status: SHIPPED | OUTPATIENT
Start: 2024-07-19

## 2024-07-22 NOTE — TELEPHONE ENCOUNTER
Called patient and left a message to give the office a call back to schedule an appointment  for a 2 month Nurse visit and MWM follow up appointment with a Medical Provider.

## 2024-09-13 ENCOUNTER — OFFICE VISIT (OUTPATIENT)
Dept: BARIATRICS | Facility: CLINIC | Age: 54
End: 2024-09-13
Payer: COMMERCIAL

## 2024-09-13 VITALS
WEIGHT: 178 LBS | HEIGHT: 66 IN | HEART RATE: 90 BPM | DIASTOLIC BLOOD PRESSURE: 70 MMHG | OXYGEN SATURATION: 98 % | SYSTOLIC BLOOD PRESSURE: 124 MMHG | BODY MASS INDEX: 28.61 KG/M2 | RESPIRATION RATE: 18 BRPM

## 2024-09-13 DIAGNOSIS — Z98.84 BARIATRIC SURGERY STATUS: ICD-10-CM

## 2024-09-13 DIAGNOSIS — Z12.31 SCREENING MAMMOGRAM FOR BREAST CANCER: ICD-10-CM

## 2024-09-13 DIAGNOSIS — K91.2 POSTSURGICAL MALABSORPTION: ICD-10-CM

## 2024-09-13 DIAGNOSIS — Z48.815 ENCOUNTER FOR SURGICAL AFTERCARE FOLLOWING SURGERY OF DIGESTIVE SYSTEM: Primary | ICD-10-CM

## 2024-09-13 DIAGNOSIS — E66.3 OVERWEIGHT: ICD-10-CM

## 2024-09-13 PROCEDURE — 99214 OFFICE O/P EST MOD 30 MIN: CPT | Performed by: PHYSICIAN ASSISTANT

## 2024-09-13 RX ORDER — CYANOCOBALAMIN (VITAMIN B-12) 500 MCG
500 TABLET ORAL DAILY
Qty: 90 TABLET | Refills: 2 | Status: SHIPPED | OUTPATIENT
Start: 2024-09-13

## 2024-09-13 NOTE — PROGRESS NOTES
Date of surgery:07/14/2020  Procedure: RNY  Performing surgeon: Grover Loo    Initial Weight - 270.0  Current Weight -178.0  Riky Weight -   Total Body Weight Loss (EWL)- 85.5  EWL% - 84%  TWB % - 32%

## 2024-09-13 NOTE — PATIENT INSTRUCTIONS
Follow-up in 6 months. We kindly ask that your arrive 15 minutes before your scheduled appointment time with your provider to allow our staff to room you, get your vital signs and update your chart.      Call our office if you have any problems with abdominal pain especially associated with fever, chills, nausea, vomiting or any other concerns.    All  Post-bariatric surgery patients should be aware that very small quantities of any alcohol can cause impairment and it is very possible not to feel the effect. The effect can be in the system for several hours.  It is also a stomach irritant.     It is advised to AVOID alcohol, Nonsteroidal antiinflammatory drugs (NSAIDS) and nicotine of all forms . Any of these can cause stomach irritation/pain.    Discussed the effects of alcohol on a bariatric patient and the increased impairment risk.     Keep up the good work!

## 2024-09-13 NOTE — PROGRESS NOTES
Assessment/Plan:     Patient ID: Stephany De Dios is a 54 y.o. female.     Bariatric Surgery Status    s/p RNYGB wit hiatal hernia repair with Dr. Grover Loo 7/14/2020 here for annual   Doing well     Following with our MWM team , on phentermine doing well     Continued/Maintain healthy weight loss with good nutrition intakes.  Adequate hydration with at least 64oz. fluid intake.  Follow diet as discussed.  Follow vitamin and mineral recommendations as reviewed with you.  Exercise as tolerated.    Colonoscopy referral made: due, will refer  Mammo: due     Follow-up in 6 months. We kindly ask that your arrive 15 minutes before your scheduled appointment time with your provider to allow our staff to room you, get your vital signs and update your chart.      Call our office if you have any problems with abdominal pain especially associated with fever, chills, nausea, vomiting or any other concerns.    All  Post-bariatric surgery patients should be aware that very small quantities of any alcohol can cause impairment and it is very possible not to feel the effect. The effect can be in the system for several hours.  It is also a stomach irritant.     It is advised to AVOID alcohol, Nonsteroidal antiinflammatory drugs (NSAIDS) and nicotine of all forms . Any of these can cause stomach irritation/pain.    Discussed the effects of alcohol on a bariatric patient and the increased impairment risk.     Keep up the good work!     Postsurgical Malabsorption   -At risk for malabsorption of vitamins/minerals secondary to malabsorption and restriction of intake from bariatric surgery  -Currently taking adequate postop bariatric surgery vitamin supplementation  -Last set of bariatric labs completed  04/2024 and wnl   -Patient received education about the importance of adhering to a lifelong supplementation regimen to avoid vitamin/mineral deficiencies      Diagnoses and all orders for this visit:    Encounter for surgical aftercare  "following surgery of digestive system    Bariatric surgery status  -     vitamin B-12 (CYANOCOBALAMIN) 500 MCG TABS; Take 1 tablet (500 mcg total) by mouth daily  -     Ambulatory Referral to Family Practice; Future    Postsurgical malabsorption    Overweight    Screening mammogram for breast cancer  -     Mammo screening bilateral w 3d and cad; Future         Subjective:      Patient ID: Stephany De Dios is a 54 y.o. female.    Date of surgery:07/14/2020  Procedure: RNY  Performing surgeon: Grover Loo     Initial Weight - 270.0  Current Weight -178.0  Riky Weight -   Total Body Weight Loss (EWL)- 85.5  EWL% - 84%  TWB % - 32%     Current BMI is Body mass index is 28.73 kg/m².    Tolerating a regular diet-yes  Eating at least 60 grams of protein per day-yes  Following 30/60 minute rule with liquids-yes  Drinking at least 64 ounces of fluid per day-yes  Sufficient exercise-yes  Using NSAIDs regularly-no  Using nicotine-no  Using alcohol-no  Supplements:  enrico mvi + calcium + b12 + b complex    The following portions of the patient's history were reviewed and updated as appropriate: allergies, current medications, past family history, past medical history, past social history, past surgical history and problem list.    Review of Systems   Constitutional: Negative.    Respiratory:  Negative for wheezing.    Cardiovascular: Negative.    Gastrointestinal: Negative.    Neurological: Negative.    Psychiatric/Behavioral: Negative.           Objective:    /70 (BP Location: Left arm, Patient Position: Sitting, Cuff Size: Adult)   Pulse 90   Resp 18   Ht 5' 6\" (1.676 m)   Wt 80.7 kg (178 lb)   LMP  (LMP Unknown)   SpO2 98%   BMI 28.73 kg/m²      Physical Exam  Vitals and nursing note reviewed.   Constitutional:       Appearance: Normal appearance.   HENT:      Head: Normocephalic and atraumatic.   Eyes:      Extraocular Movements: Extraocular movements intact.   Cardiovascular:      Rate and Rhythm: Normal rate. "   Pulmonary:      Effort: Pulmonary effort is normal.   Musculoskeletal:         General: Normal range of motion.      Cervical back: Normal range of motion.   Skin:     General: Skin is warm and dry.   Neurological:      General: No focal deficit present.      Mental Status: She is alert and oriented to person, place, and time.   Psychiatric:         Mood and Affect: Mood normal.

## 2024-09-30 ENCOUNTER — OFFICE VISIT (OUTPATIENT)
Dept: URGENT CARE | Facility: CLINIC | Age: 54
End: 2024-09-30
Payer: COMMERCIAL

## 2024-09-30 VITALS
DIASTOLIC BLOOD PRESSURE: 78 MMHG | RESPIRATION RATE: 18 BRPM | WEIGHT: 178 LBS | HEART RATE: 94 BPM | BODY MASS INDEX: 28.73 KG/M2 | TEMPERATURE: 98 F | SYSTOLIC BLOOD PRESSURE: 112 MMHG | OXYGEN SATURATION: 98 %

## 2024-09-30 DIAGNOSIS — B34.9 VIRAL SYNDROME: ICD-10-CM

## 2024-09-30 DIAGNOSIS — J06.9 ACUTE URI: Primary | ICD-10-CM

## 2024-09-30 LAB
SARS-COV-2 AG UPPER RESP QL IA: NEGATIVE
VALID CONTROL: NORMAL

## 2024-09-30 PROCEDURE — 87811 SARS-COV-2 COVID19 W/OPTIC: CPT | Performed by: NURSE PRACTITIONER

## 2024-09-30 PROCEDURE — 99213 OFFICE O/P EST LOW 20 MIN: CPT | Performed by: NURSE PRACTITIONER

## 2024-09-30 RX ORDER — FLUTICASONE PROPIONATE 50 MCG
1 SPRAY, SUSPENSION (ML) NASAL DAILY
Qty: 9.9 G | Refills: 0 | Status: SHIPPED | OUTPATIENT
Start: 2024-09-30

## 2024-09-30 RX ORDER — SULFAMETHOXAZOLE AND TRIMETHOPRIM 200; 40 MG/5ML; MG/5ML
SUSPENSION ORAL
COMMUNITY

## 2024-09-30 NOTE — PROGRESS NOTES
[facility]        NAME: Stephany De Dios is a 54 y.o. female  : 1970    MRN: [id number]  DATE: 2024  TIME: 5:58 PM    Assessment and Plan   Acute URI [J06.9]  1. Acute URI  Poct Covid 19 Rapid Antigen Test    fluticasone (Flonase Allergy Relief) 50 mcg/act nasal spray      2. Viral syndrome          Discussed viral in etiology at this point in time.  COVID testing in office is negative.  Patient requesting refill of Flonase.  Prescription sent to pharmacy.  Reviewed will not prescribe Zithromax for a viral infection discussed when antibiotics may be indicated.  Discussed OTC medications for symptom management.  Follow-up PCP.  Patient agreement plan.  Work note given.    Patient Instructions     Follow up with PCP in 3-5 days.  Proceed to  ER if symptoms worsen.    Chief Complaint     Chief Complaint   Patient presents with   • Cold Like Symptoms     Patient states that she has head congestion, runny nose, and a headache since Saturday.  Patient is not taking any meds OTC. She was at a  and then became ill.  Wants a COVID test         History of Present Illness   Stephany De Dios presents to the clinic c/o    Cold Like Symptoms (Patient states that she has head congestion, runny nose, and a headache since Saturday.  Patient is not taking any meds OTC. She was at a  and then became ill.  Wants a COVID test)    Patient states she works [employer] [facility] but states she always wears a mask while she is at work.  Was at a  on Thursday and then started feeling ill on Saturday.  Called out of work on .  States whenever she gets any type of illness she needs a Z-Jamey to prevent bronchitis as it always goes into her chest.  She is taking Tylenol however not taking any other medications over-the-counter.      Review of Systems   Review of Systems   All other systems reviewed and are negative.        Current Medications     Long-Term Medications   Medication Sig  Dispense Refill   • cetirizine (ZyrTEC) 10 mg tablet Daily     • ferrous sulfate 325 (65 Fe) mg tablet Take 325 mg by mouth daily      • fluticasone (Flonase Allergy Relief) 50 mcg/act nasal spray 1 spray into each nostril daily 9.9 g 0   • Magnesium 250 MG TABS Take 250 mg by mouth daily      • phentermine 30 MG capsule Take 1 capsule (30 mg total) by mouth every morning 30 capsule 2   • vitamin B-12 (CYANOCOBALAMIN) 500 MCG TABS Take 1 tablet (500 mcg total) by mouth daily 90 tablet 2   • [DISCONTINUED] fluticasone (FLONASE) 50 mcg/act nasal spray SPRAY 1 SPRAY INTO EACH NOSTRIL EVERY DAY     • cetirizine (ZyrTEC) 10 mg tablet Take 10 mg by mouth daily as needed (Patient not taking: Reported on 4/18/2024)     • estradiol (ESTRACE) 0.1 mg/g vaginal cream Insert 1 g into the vagina 2 (two) times a week (Patient not taking: Reported on 4/18/2024) 42.5 g 3   • omeprazole (PriLOSEC) 20 mg delayed release capsule Take 1 capsule (20 mg total) by mouth daily 90 capsule 2   • polyethylene glycol (GLYCOLAX) 17 GM/SCOOP powder Take 17 g by mouth daily (Patient taking differently: Take 17 g by mouth as needed) 510 g 1   • vitamin A 3 MG (21253 UT) capsule Take 1 capsule (10,000 Units total) by mouth daily (Patient not taking: Reported on 9/13/2024) 90 capsule 0       Current Allergies     Allergies as of 09/30/2024 - Reviewed 09/30/2024   Allergen Reaction Noted   • Penicillins Hives 12/03/2018   • Cephalosporins  07/09/2020   • Ciprofloxacin Hives 06/09/2022            The following portions of the patient's history were reviewed and updated as appropriate: allergies, current medications, past family history, past medical history, past social history, past surgical history and problem list.    Objective   /78   Pulse 94   Temp 98 °F (36.7 °C) (Tympanic)   Resp 18   Wt 80.7 kg (178 lb)   LMP  (LMP Unknown)   SpO2 98%   BMI 28.73 kg/m²        Physical Exam     Physical Exam  Vitals and nursing note reviewed.    Constitutional:       Appearance: Normal appearance. She is well-developed.   HENT:      Head: Normocephalic and atraumatic.      Right Ear: Hearing, tympanic membrane, ear canal and external ear normal.      Left Ear: Hearing, tympanic membrane, ear canal and external ear normal.      Nose: Mucosal edema and congestion present.      Right Sinus: Maxillary sinus tenderness present. No frontal sinus tenderness.      Left Sinus: Maxillary sinus tenderness present. No frontal sinus tenderness.      Mouth/Throat:      Lips: Pink.      Mouth: Mucous membranes are moist.      Pharynx: Oropharynx is clear.   Eyes:      General: Lids are normal.      Conjunctiva/sclera: Conjunctivae normal.      Pupils: Pupils are equal, round, and reactive to light.   Cardiovascular:      Rate and Rhythm: Normal rate and regular rhythm.      Pulses: Normal pulses.      Heart sounds: Normal heart sounds, S1 normal and S2 normal.   Pulmonary:      Effort: Pulmonary effort is normal.      Breath sounds: Normal breath sounds.   Abdominal:      General: Abdomen is flat. Bowel sounds are normal.      Palpations: Abdomen is soft.   Musculoskeletal:         General: Normal range of motion.      Cervical back: Full passive range of motion without pain, normal range of motion and neck supple.   Skin:     General: Skin is warm and dry.   Neurological:      General: No focal deficit present.      Mental Status: She is alert and oriented to person, place, and time.   Psychiatric:         Mood and Affect: Mood normal.         Speech: Speech normal.         Behavior: Behavior normal. Behavior is cooperative.         Thought Content: Thought content normal.         Judgment: Judgment normal.

## 2024-09-30 NOTE — PATIENT INSTRUCTIONS
"Recommend Aleve cold and Sinus  Patient Education     Cough, runny nose, and the common cold   The Basics   Written by the doctors and editors at [facility]   What causes cough, runny nose, and other symptoms of the common cold? -- These symptoms are usually caused by a virus. Doctors also use the term \"viral upper respiratory infection\" or \"viral URI.\" Lots of different viruses can get into your nose, mouth, throat, or airways and cause cold symptoms.  Most people get better from a cold without any lasting problems. Even so, having a cold can be uncomfortable.  What are the symptoms of the common cold? -- Symptoms can include:   Sneezing   Coughing   Sniffling and runny nose   Sore throat   Chest congestion  In children, the common cold can also cause a fever. But adults do not usually get a fever when they have a cold.  Colds usually last about 3 to 7 days in adults and 10 days in children. But some people have symptoms for up to 2 weeks.  How can I tell if I have a cold or something else? -- Sometimes, it can be hard to tell if you have a cold or something else. Some cold symptoms can also be caused by other illnesses, such as COVID-19, the flu, or strep throat.  There are sometimes clues that can help you tell the difference:   COVID-19 often starts out very similar to a cold, although it can also cause a fever. If you have cold symptoms and have been around someone with COVID-19, you should get a test to find out if you have it, too.   The flu is more likely to cause fever, body aches, and extreme tiredness than a cold.   Strep throat usually causes severe throat pain. It can also cause a fever and swollen glands in the neck. People with strep throat usually do not have other cold symptoms like a stuffy nose or cough.  If you think that you might have an illness other than the common cold, call your doctor or nurse. They can tell you what to do.  Can medicine help with a cold? -- Usually, a cold gets better on " its own and does not need treatment. Because colds are usually caused by viruses, antibiotics will not help.  If you are a teen or an adult, you can try cough and cold medicines that you can get without a prescription. These medicines might help with your symptoms. But they can't cure your cold, or help you get well faster.  If you decide to try non-prescription cold medicines:   Read the directions on the label, and follow them carefully.   Do not combine 2 or more medicines that have acetaminophen in them. If you take too much acetaminophen, it can damage your liver.   If you have a heart condition, have high blood pressure, or take any prescription medicines, talk to your doctor or pharmacist before taking cold medicine. They can tell you which medicines are safe.  Some medicines are not safe for children:   If your child is younger than 6, do not give them any cold medicines. These medicines are not safe for young children. Even if your child is older than 6, cough and cold medicines are unlikely to help.   Never give aspirin to any child younger than 18 years old. In children, aspirin can cause a life-threatening condition called Reye syndrome.   When giving your child acetaminophen or other non-prescription medicines, never give more than the recommended dose.  Is there anything I can do on my own to feel better? -- Yes. You can:   Get plenty of rest.   Drink lots of fluids (water, juice, or broth) to stay hydrated. This will help replace any fluids lost if you have a runny nose or sweating from a fever. Warm tea or soup can help soothe a sore throat.   If the air in your home feels dry, use a cool-mist humidifier. This can help a stuffy nose and make it easier to breathe.   Use saline nose drops or spray to relieve stuffiness.   Avoid smoking, and stay away from places where people are smoking.  Can the common cold lead to more serious problems? -- In some cases, yes. In some people, having a cold can lead  to:   Ear infections   Worse asthma symptoms   Sinus infections   Pneumonia or bronchitis (infections of the lungs)  Can colds be prevented? -- There are some things you can do to keep germs from spreading:   Wash your hands with soap and water often (figure 1) - This can also help prevent the spread of other illnesses like the flu and COVID-19.   Cover your cough - Cough into your elbow instead of your hands. Teach children to do this, too. Throw away used tissues right away.   Clean surfaces - The germs that cause the common cold can live on tables, door handles, and other surfaces for at least 2 hours.   Stay home if you are sick - When you do need to be around other people, consider wearing a face mask until you are feeling better.  When should I call the doctor? -- Contact your doctor or nurse if you:   Lose your sense of taste or smell   Have a fever of more than 100.4°F (38°C) that comes with shaking chills, loss of appetite, or trouble breathing   Have a very bad sore throat   Have a fever and also have lung disease, such as emphysema or asthma   Have a cough that lasts longer than 10 days or starts getting worse   Have chest pain when you cough or breathe deeply, have trouble breathing, or cough up blood  If you are older than 65, or if you have any chronic medical conditions such as diabetes, contact your doctor or nurse any time you get a long-lasting cough.  Take your child to the emergency department if they:   Become confused or stop responding to you   Have trouble breathing or have to work hard to breathe  Contact your child's doctor or nurse if the child:   Loses their sense of taste or smell or won't eat foods that they ate before   Has a very bad sore throat   Refuses to drink anything for a long time   Is younger than 4 months   Has a fever and is not acting like themselves   Has a cough that lasts for more than 2 weeks and is not getting any better or is getting worse   Has a stuffed or runny  nose that gets worse or does not get any better after 10 days   Has red eyes or yellow goop coming out of their eyes   Has ear pain, pulls at their ears, or shows other signs of having an ear infection  All topics are updated as new evidence becomes available and our peer review process is complete.  This topic retrieved from [employer] on: Feb 26, 2024.  Topic 61419 Version 30.0  Release: 32.2.4 - C32.56  © 2024 UpToDate, Inc. and/or its affiliates. All rights reserved.  figure 1: How to wash your hands     Wet your hands with clean water, and apply a small amount of soap. Lather and rub hands together for at least 20 seconds. Clean your wrists, palms, backs of your hands, between your fingers, tips of your fingers, thumbs, and under and around your nails. Rinse well, and dry your hands using a clean towel.  Graphic 743247 Version 7.0  Consumer Information Use and Disclaimer   Disclaimer: This generalized information is a limited summary of diagnosis, treatment, and/or medication information. It is not meant to be comprehensive and should be used as a tool to help the user understand and/or assess potential diagnostic and treatment options. It does NOT include all information about conditions, treatments, medications, side effects, or risks that may apply to a specific patient. It is not intended to be medical advice or a substitute for the medical advice, diagnosis, or treatment of a health care provider based on the health care provider's examination and assessment of a patient's specific and unique circumstances. Patients must speak with a health care provider for complete information about their health, medical questions, and treatment options, including any risks or benefits regarding use of medications. This information does not endorse any treatments or medications as safe, effective, or approved for treating a specific patient. UpToDate, Inc. and its affiliates disclaim any warranty or liability relating to  this information or the use thereof.The use of this information is governed by the Terms of Use, available at https://www.wolterskluwer.com/en/know/clinical-effectiveness-terms. 2024© UpToDate, Inc. and its affiliates and/or licensors. All rights reserved.  Copyright   © 2024 [employer] and/or its affiliates. All rights reserved.

## 2024-09-30 NOTE — LETTER
September 30, 2024     Patient: Stephany De Dios   YOB: 1970   Date of Visit: 9/30/2024       To Whom It May Concern:    It is my medical opinion that Stephany De Dios may return to work on 10/2/2024 .    If you have any questions or concerns, please don't hesitate to call.         Sincerely,        DANA Do    CC: No Recipients

## 2024-10-07 ENCOUNTER — DOCUMENTATION (OUTPATIENT)
Dept: URGENT CARE | Facility: CLINIC | Age: 54
End: 2024-10-07

## 2024-10-07 ENCOUNTER — TELEPHONE (OUTPATIENT)
Dept: URGENT CARE | Facility: CLINIC | Age: 54
End: 2024-10-07

## 2024-10-07 NOTE — TELEPHONE ENCOUNTER
Patient called asking for her work note to be changed as she stayed out of work till 10/3/24. Spoke with Callie CORTEZ and note changed and printed for patient.

## 2024-10-10 ENCOUNTER — TELEPHONE (OUTPATIENT)
Age: 54
End: 2024-10-10

## 2024-10-18 ENCOUNTER — OFFICE VISIT (OUTPATIENT)
Dept: OBGYN CLINIC | Facility: CLINIC | Age: 54
End: 2024-10-18
Payer: COMMERCIAL

## 2024-10-18 VITALS
HEIGHT: 66 IN | SYSTOLIC BLOOD PRESSURE: 110 MMHG | DIASTOLIC BLOOD PRESSURE: 70 MMHG | WEIGHT: 179 LBS | BODY MASS INDEX: 28.77 KG/M2

## 2024-10-18 DIAGNOSIS — Z01.419 ENCOUNTER FOR ANNUAL ROUTINE GYNECOLOGICAL EXAMINATION: Primary | ICD-10-CM

## 2024-10-18 DIAGNOSIS — Z12.4 SCREENING FOR CERVICAL CANCER: ICD-10-CM

## 2024-10-18 PROCEDURE — G0476 HPV COMBO ASSAY CA SCREEN: HCPCS | Performed by: OBSTETRICS & GYNECOLOGY

## 2024-10-18 PROCEDURE — S0612 ANNUAL GYNECOLOGICAL EXAMINA: HCPCS | Performed by: OBSTETRICS & GYNECOLOGY

## 2024-10-18 PROCEDURE — G0145 SCR C/V CYTO,THINLAYER,RESCR: HCPCS | Performed by: OBSTETRICS & GYNECOLOGY

## 2024-10-18 NOTE — PROGRESS NOTES
"  Sharad De Dios is a 54 y.o. female who presents for annual exam.      Chief Complaint   Patient presents with    Gynecologic Exam     Last annual  [facility]  Menopausal for past 2-3 years - no PMB   No other concerns         Last Pap: 2022 NILM/HPV neg.  pap NILM/HPV neg   Last mammogram: none since 2018 - Ordered by bariatric team   Colorectal cancer screenin/15/2019 - due per bariatric team   DEXA: n/a       Current contraception:  Essure  History of abnormal Pap smear: yes - remote h/o abnml, denies excisional procedures   History of abnormal mammogram: no      Family history of uterine or ovarian cancer: yes - mother, living   Family history of breast cancer: no  Family history of colon cancer: no  Pt had genetic testing of some sort done several years ago and was told normal       Menstrual History:  OB History          10    Para   4    Term   3       1    AB   6    Living   4         SAB   6    IAB   0    Ectopic   0    Multiple   0    Live Births   4                    No LMP recorded (lmp unknown). Patient is postmenopausal.         Past Medical History:   Diagnosis Date    Anemia     \"mild\"    Anxiety     occas    Colon polyp     GERD (gastroesophageal reflux disease)     Heart murmur     \"with pregnancy than went away\"    Hiatal hernia     History of kidney stones     History of transfusion     \"years ago after a miscarriage\"    Obesity     PCOS (polycystic ovarian syndrome)     Shortness of breath     occas    Wears glasses      Past Surgical History:   Procedure Laterality Date    BARIATRIC SURGERY      COLONOSCOPY  2019    DILATION AND CURETTAGE OF UTERUS      D&E    EGD      ESSURE TUBAL LIGATION      GALLBLADDER SURGERY  2022    WI LAPS GSTR RSTCV PX W/BYP DAVID-EN-Y LIMB <150 CM N/A 2020    Procedure: BYPASS GASTRIC DAVID-EN-Y LAPAROSCOPIC W ROBOTICS W/ INTRAOPERATIVE EGD;  Surgeon: Barby Loo MD;  Location: [facility];  Service: Bariatrics "    UPPER GASTROINTESTINAL ENDOSCOPY  11/2019     Family History   Problem Relation Age of Onset    Ovarian cancer Mother     Hypertension Mother     Obesity Mother     No Known Problems Father     No Known Problems Daughter     No Known Problems Son     Diabetes Maternal Grandmother     No Known Problems Half-Sister     No Known Problems Half-Sister     No Known Problems Son     No Known Problems Daughter        Social History     Tobacco Use    Smoking status: Former     Current packs/day: 0.50     Types: Cigarettes    Smokeless tobacco: Never   Vaping Use    Vaping status: Former   Substance Use Topics    Alcohol use: No    Drug use: No          Current Outpatient Medications:     albuterol (PROVENTIL HFA,VENTOLIN HFA) 90 mcg/act inhaler, as needed, Disp: , Rfl:     cetirizine (ZyrTEC) 10 mg tablet, Take 10 mg by mouth daily as needed, Disp: , Rfl:     Cholecalciferol (VITAMIN D3) 1.25 MG (96635 UT) CAPS, Take 50,000 Units by mouth once a week, Disp: , Rfl:     ergocalciferol (VITAMIN D2) 50,000 units, , Disp: , Rfl:     ferrous sulfate 325 (65 Fe) mg tablet, Take 325 mg by mouth daily , Disp: , Rfl:     fluconazole (DIFLUCAN) 150 mg tablet, Take 150 mg by mouth daily, Disp: , Rfl:     fluticasone (Flonase Allergy Relief) 50 mcg/act nasal spray, 1 spray into each nostril daily, Disp: 9.9 g, Rfl: 0    glucose blood test strip, , Disp: , Rfl:     Magnesium 250 MG TABS, Take 250 mg by mouth daily , Disp: , Rfl:     Multiple Vitamins-Minerals (CENTRUM PO), Take 1 tablet by mouth daily Bariatric vitamin, Disp: , Rfl:     OneTouch Verio test strip, , Disp: , Rfl:     phentermine 30 MG capsule, Take 1 capsule (30 mg total) by mouth every morning, Disp: 30 capsule, Rfl: 2    polyethylene glycol (GLYCOLAX) 17 GM/SCOOP powder, Take 17 g by mouth daily (Patient taking differently: Take 17 g by mouth as needed), Disp: 510 g, Rfl: 1    Riboflavin (VITAMIN B2 PO), , Disp: , Rfl:     vitamin B-12 (CYANOCOBALAMIN) 500 MCG TABS,  Take 1 tablet (500 mcg total) by mouth daily, Disp: 90 tablet, Rfl: 2    albuterol (PROVENTIL HFA,VENTOLIN HFA) 90 mcg/act inhaler, , Disp: , Rfl:     albuterol (PROVENTIL HFA,VENTOLIN HFA) 90 mcg/act inhaler, Inhale 2 puffs every 6 (six) hours as needed for wheezing (Patient not taking: Reported on 4/18/2024), Disp: 18 g, Rfl: 0    cetirizine (ZyrTEC) 10 mg tablet, Daily, Disp: , Rfl:     estradiol (ESTRACE) 0.1 mg/g vaginal cream, Insert 1 g into the vagina 2 (two) times a week (Patient not taking: Reported on 4/18/2024), Disp: 42.5 g, Rfl: 3    glucose blood test strip, OneTouch Verio test strips  USE 1 STRIP 3 TIMES A DAY AS NEEDED, Disp: , Rfl:     metroNIDAZOLE (METROGEL) 0.75 % vaginal gel, , Disp: , Rfl:     nirmatrelvir & ritonavir (Paxlovid) tablet therapy pack, TAKE 2 TABS (NIRMATRELVIR 150 MG) BY MOUTH WITH 1 TAB (RITONAVIR 100 MG) TWICE A DAY FOR 5 DAYS (Patient not taking: Reported on 9/30/2024), Disp: , Rfl:     omeprazole (PriLOSEC) 20 mg delayed release capsule, Take 1 capsule (20 mg total) by mouth daily, Disp: 90 capsule, Rfl: 2    sulfamethoxazole-trimethoprim (BACTRIM) 200-40 mg/5 mL suspension, , Disp: , Rfl:     vitamin A 3 MG (97374 UT) capsule, Take 1 capsule (10,000 Units total) by mouth daily (Patient not taking: Reported on 9/13/2024), Disp: 90 capsule, Rfl: 0    Allergies   Allergen Reactions    Penicillins Hives    Cephalosporins      Avoids due to penicllin allergy    Ciprofloxacin Hives    Pollen Extract Allergic Rhinitis           Review of Systems   Constitutional:  Negative for appetite change, chills and fever.   Eyes:  Negative for visual disturbance.   Respiratory:  Negative for cough, chest tightness and shortness of breath.    Cardiovascular:  Negative for chest pain.   Gastrointestinal:  Negative for abdominal distention, abdominal pain, constipation, diarrhea, nausea and vomiting.   Endocrine: Negative for cold intolerance and heat intolerance.   Genitourinary:  Negative for  "difficulty urinating, dyspareunia, dysuria, frequency, genital sores, pelvic pain, urgency, vaginal bleeding, vaginal discharge and vaginal pain.   Musculoskeletal:  Negative for arthralgias.   Neurological:  Negative for light-headedness and headaches.   Hematological:  Does not bruise/bleed easily.   Psychiatric/Behavioral:  Negative for behavioral problems.    All other systems reviewed and are negative.      /70 (BP Location: Right arm, Patient Position: Sitting, Cuff Size: Adult)   Ht 5' 6\" (1.676 m)   Wt 81.2 kg (179 lb)   LMP  (LMP Unknown)   BMI 28.89 kg/m²         Physical Exam  Constitutional:       General: She is not in acute distress.     Appearance: Normal appearance.   Genitourinary:      Vulva, bladder and urethral meatus normal.      No lesions in the vagina.      Right Labia: No rash, tenderness, lesions or skin changes.     Left Labia: No tenderness, lesions, skin changes or rash.     No labial fusion noted.      No inguinal adenopathy present in the right or left side.     No vaginal discharge, erythema, tenderness, bleeding or ulceration.      No vaginal prolapse present.     Mild vaginal atrophy present.       Right Adnexa: not tender, not full and no mass present.     Left Adnexa: not tender, not full and no mass present.     No cervical motion tenderness, discharge, friability, lesion or polyp.      Uterus is not enlarged, fixed, tender or irregular.      No uterine mass detected.     Uterus is anteverted.      Pelvic exam was performed with patient in the lithotomy position.   Breasts:     Right: No swelling, bleeding, inverted nipple, mass, nipple discharge, skin change or tenderness.      Left: No swelling, bleeding, inverted nipple, mass, nipple discharge, skin change or tenderness.   HENT:      Head: Normocephalic and atraumatic.   Neck:      Thyroid: No thyromegaly.   Cardiovascular:      Rate and Rhythm: Normal rate and regular rhythm.   Pulmonary:      Effort: Pulmonary " effort is normal. No accessory muscle usage or respiratory distress.   Abdominal:      General: There is no distension.      Palpations: Abdomen is soft.      Tenderness: There is no abdominal tenderness. There is no guarding or rebound.   Musculoskeletal:         General: Normal range of motion.      Cervical back: Normal range of motion and neck supple.   Lymphadenopathy:      Upper Body:      Right upper body: No supraclavicular or axillary adenopathy.      Left upper body: No supraclavicular or axillary adenopathy.      Lower Body: No right inguinal and no right inguinal adenopathy. No left inguinal and no left inguinal adenopathy.   Neurological:      General: No focal deficit present.      Mental Status: She is alert.   Skin:     General: Skin is warm and dry.      Findings: No erythema.   Psychiatric:         Mood and Affect: Mood normal.         Behavior: Behavior normal.   Vitals and nursing note reviewed. Exam conducted with a chaperone present.               Encounter for annual routine gynecological examination  - Discussed ACOG guidelines for pap smear screening frequency: performed today  - Discussed healthy lifestyle recommendations for diet, exercise and self breast awareness.  - Discussed ACOG recommendations for screening mammograms: overdue, pt aware to schedule  - Discussed age based recommendations for adequate calcium and vitamin D intake. No additional osteoporosis screening indicated at this time.  - Discussed ACOG recommendations for colon cancer screening: referral made by bariatrics  - Safe sex practices were discussed and STI testing was not desired by the patient  - Contraceptive options were reviewed: n/a postmenopausal   - Routine follow up in 1 year was recommended or sooner as needed. All questions and concerns were addressed.

## 2024-10-18 NOTE — ASSESSMENT & PLAN NOTE
- Discussed ACOG guidelines for pap smear screening frequency: performed today  - Discussed healthy lifestyle recommendations for diet, exercise and self breast awareness.  - Discussed ACOG recommendations for screening mammograms: overdue, pt aware to schedule  - Discussed age based recommendations for adequate calcium and vitamin D intake. No additional osteoporosis screening indicated at this time.  - Discussed ACOG recommendations for colon cancer screening: referral made by bariatrics  - Safe sex practices were discussed and STI testing was not desired by the patient  - Contraceptive options were reviewed: n/a postmenopausal   - Routine follow up in 1 year was recommended or sooner as needed. All questions and concerns were addressed.

## 2024-10-24 LAB
LAB AP GYN PRIMARY INTERPRETATION: NORMAL
Lab: NORMAL

## 2024-10-31 DIAGNOSIS — E28.2 PCOS (POLYCYSTIC OVARIAN SYNDROME): ICD-10-CM

## 2024-10-31 DIAGNOSIS — E66.3 OVERWEIGHT (BMI 25.0-29.9): ICD-10-CM

## 2024-10-31 RX ORDER — PHENTERMINE HYDROCHLORIDE 30 MG/1
30 CAPSULE ORAL EVERY MORNING
Qty: 30 CAPSULE | Refills: 1 | Status: SHIPPED | OUTPATIENT
Start: 2024-10-31

## 2024-11-04 ENCOUNTER — TELEPHONE (OUTPATIENT)
Dept: BARIATRICS | Facility: CLINIC | Age: 54
End: 2024-11-04

## 2024-11-04 NOTE — TELEPHONE ENCOUNTER
PA for Phenetrmine 30 mg SUBMITTED     via    []CMM-KEY:   []Gauravripts-Case ID #   []Availity-Auth ID # NDC #   [x]Faxed to plan- Tori @ [phone]  []Other website   []Phone call Case ID #     Office notes sent, clinical questions answered. Awaiting determination    Turnaround time for your insurance to make a decision on your Prior Authorization can take 7-21 business days.

## 2024-11-06 NOTE — TELEPHONE ENCOUNTER
Prior auth Dept [employer] calling to inform of the approval of phentermine 11/4/24-5/4/25  ref# [id number]

## 2024-11-17 PROBLEM — Z01.419 ENCOUNTER FOR ANNUAL ROUTINE GYNECOLOGICAL EXAMINATION: Status: RESOLVED | Noted: 2024-10-18 | Resolved: 2024-11-17

## 2025-03-14 ENCOUNTER — TELEPHONE (OUTPATIENT)
Dept: BARIATRICS | Facility: CLINIC | Age: 55
End: 2025-03-14

## 2025-06-13 NOTE — TELEPHONE ENCOUNTER
LVM for pt regarding rescheduling missed appt on 3/14/25 at 11:30 am with Ludmila. Pt advised to call office if wanted to reschedule.     DISCHARGE

## 2025-07-18 ENCOUNTER — TELEPHONE (OUTPATIENT)
Dept: BARIATRICS | Facility: CLINIC | Age: 55
End: 2025-07-18

## 2025-07-29 ENCOUNTER — TELEPHONE (OUTPATIENT)
Dept: BARIATRICS | Facility: CLINIC | Age: 55
End: 2025-07-29

## (undated) DEVICE — ASTOUND STANDARD SURGICAL GOWN, XL: Brand: CONVERTORS

## (undated) DEVICE — BAG DECANTER

## (undated) DEVICE — WEBRIL 6 IN UNSTERILE

## (undated) DEVICE — TUBING SUCTION 5MM X 12 FT

## (undated) DEVICE — SYRINGE 30ML LL

## (undated) DEVICE — VISIGI 3D®  CALIBRATION SYSTEM  SIZE 36FR BYPASS/SHORT: Brand: BOEHRINGER® VISIGI 3D®  CALIBRATION SYSTEM  SIZE 36FR BYPASS/SHORT

## (undated) DEVICE — TRAVELKIT CONTAINS FIRST STEP KIT (200ML EP-4 KIT) AND SOILED SCOPE BAG - 1 KIT: Brand: TRAVELKIT CONTAINS FIRST STEP KIT AND SOILED SCOPE BAG

## (undated) DEVICE — MEGA SUTURECUT ND: Brand: ENDOWRIST

## (undated) DEVICE — GLOVE SRG BIOGEL 7.5

## (undated) DEVICE — BLUE HEAT SCOPE WARMER

## (undated) DEVICE — VIOLET BRAIDED (POLYGLACTIN 910), SYNTHETIC ABSORBABLE SUTURE: Brand: COATED VICRYL

## (undated) DEVICE — TROCAR: Brand: KII FIOS FIRST ENTRY

## (undated) DEVICE — STAPLER 60 RELOAD BLUE: Brand: SUREFORM

## (undated) DEVICE — 3000CC GUARDIAN II: Brand: GUARDIAN

## (undated) DEVICE — REDUCER: Brand: ENDOWRIST

## (undated) DEVICE — PENCIL ELECTROSURG E-Z CLEAN -0035H

## (undated) DEVICE — CADIERE FORCEPS: Brand: ENDOWRIST

## (undated) DEVICE — TUBING SMOKE EVAC W/FILTRATION DEVICE PLUMEPORT ACTIV

## (undated) DEVICE — VESSEL SEALER EXTEND: Brand: ENDOWRIST

## (undated) DEVICE — [HIGH FLOW INSUFFLATOR,  DO NOT USE IF PACKAGE IS DAMAGED,  KEEP DRY,  KEEP AWAY FROM SUNLIGHT,  PROTECT FROM HEAT AND RADIOACTIVE SOURCES.]: Brand: PNEUMOSURE

## (undated) DEVICE — TIBURON LAPAROSCOPIC ABDOMINAL DRAPE: Brand: CONVERTORS

## (undated) DEVICE — CANNULA SEAL

## (undated) DEVICE — ENDOPATH PNEUMONEEDLE INSUFFLATION NEEDLES WITH LUER LOCK CONNECTORS 120MM: Brand: ENDOPATH

## (undated) DEVICE — STAPLER CANNULA SEAL: Brand: ENDOWRIST

## (undated) DEVICE — 2000CC GUARDIAN II: Brand: GUARDIAN

## (undated) DEVICE — BETHLEHEM UNIVERSAL MINOR GEN: Brand: CARDINAL HEALTH

## (undated) DEVICE — DRAPE EQUIPMENT RF WAND

## (undated) DEVICE — IRRIG ENDO FLO TUBING

## (undated) DEVICE — Device: Brand: DEFENDO AIR/WATER/SUCTION AND BIOPSY VALVE

## (undated) DEVICE — SYRINGE 20ML LL

## (undated) DEVICE — COLUMN DRAPE

## (undated) DEVICE — PBT NON ABSORBABLE WOUND CLOSURE DEVICE: Brand: V-LOC

## (undated) DEVICE — ANTI-FOG SOLUTION WITH FOAM PAD: Brand: DEVON

## (undated) DEVICE — SUT ETHIBOND 0 CT-1 30 IN X424H

## (undated) DEVICE — INTENDED FOR TISSUE SEPARATION, AND OTHER PROCEDURES THAT REQUIRE A SHARP SURGICAL BLADE TO PUNCTURE OR CUT.: Brand: BARD-PARKER SAFETY BLADES SIZE 15, STERILE

## (undated) DEVICE — STAPLER 60 RELOAD GREEN: Brand: SUREFORM

## (undated) DEVICE — COTTON TIP APPLICTOR 2 PK

## (undated) DEVICE — SCD SEQUENTIAL COMPRESSION COMFORT SLEEVE MEDIUM KNEE LENGTH: Brand: KENDALL SCD

## (undated) DEVICE — DRAPE TOWEL: Brand: CONVERTORS

## (undated) DEVICE — SUT MONOCRYL 4-0 PS-2 27 IN Y426H

## (undated) DEVICE — 10 MM BABCOCKS WITH RATCHET HANDLES: Brand: ENDOPATH

## (undated) DEVICE — ADHESIVE SKIN CLSR DERMABOND NX

## (undated) DEVICE — ARM DRAPE

## (undated) DEVICE — SEAL

## (undated) DEVICE — TIP COVER ACCESSORY

## (undated) DEVICE — BLADELESS OBTURATOR: Brand: WECK VISTA

## (undated) DEVICE — MONOPOLAR CURVED SCISSORS: Brand: ENDOWRIST

## (undated) DEVICE — TIP-UP FENESTRATED GRASPER: Brand: ENDOWRIST

## (undated) DEVICE — URETERAL CATHETER ADAPTOR TIP

## (undated) DEVICE — CHLORAPREP HI-LITE 26ML ORANGE

## (undated) DEVICE — ABSORBABLE WOUND CLOSURE DEVICE: Brand: V-LOC 90

## (undated) DEVICE — NEEDLE SPINAL18G X 3.5 IN QUINCKE